# Patient Record
Sex: FEMALE | Race: WHITE | NOT HISPANIC OR LATINO | Employment: FULL TIME | URBAN - METROPOLITAN AREA
[De-identification: names, ages, dates, MRNs, and addresses within clinical notes are randomized per-mention and may not be internally consistent; named-entity substitution may affect disease eponyms.]

---

## 2020-03-11 ENCOUNTER — OFFICE VISIT (OUTPATIENT)
Dept: OBSTETRICS AND GYNECOLOGY | Facility: CLINIC | Age: 30
End: 2020-03-11
Payer: COMMERCIAL

## 2020-03-11 VITALS — SYSTOLIC BLOOD PRESSURE: 108 MMHG | DIASTOLIC BLOOD PRESSURE: 60 MMHG

## 2020-03-11 DIAGNOSIS — O20.0 THREATENED ABORTION: Primary | ICD-10-CM

## 2020-03-11 PROCEDURE — 76801 OB US < 14 WKS SINGLE FETUS: CPT | Performed by: OBSTETRICS & GYNECOLOGY

## 2020-03-11 PROCEDURE — 99203 OFFICE O/P NEW LOW 30 MIN: CPT | Performed by: OBSTETRICS & GYNECOLOGY

## 2020-03-11 NOTE — PROGRESS NOTES
Patient is a 29 y.o. female who presents with amenorrhea.   LMP 20 puts at 5 3/7 weeks  Menses q28 days   Vaginal bleeding started yesterday  No N/V  + Breast tenderness  Mild mild lower abdominal pain   TTC     Lives with   Works as a teacher, first grade   No Zika travel in last 6 months   Pets at home - dog  Chicken pox - was vaccinated     OB History:   OB History    Para Term  AB Living   0 0 0 0 0 0   SAB TAB Ectopic Multiple Live Births   0 0 0 0 0         Medical History:   Past Medical History:   Diagnosis Date   • Anxiety     used to see a therapist, previously on lexapro       Surgical History:   Past Surgical History:   Procedure Laterality Date   • TONSILLECTOMY & ADENOIDECTOMY         Social History:   Social History     Social History Narrative   • Not on file       Family History:   Family History   Problem Relation Age of Onset   • Liver cancer Biological Father    • Ovarian cancer Father's Sister    • Liver cancer Father's Brother        Allergies: Patient has no known allergies.    Prior to Admission medications    Not on File       Review of Systems  Pertinent items are noted in HPI.    Objective     Vital Signs for the last 24 hours:  BP: (108)/(60) 108/60    Review of Systems and Physical Exam  The following have been reviewed and updated as appropriate in this visit: Tobacco  Allergies  Meds  Med Hx  Surg Hx  Fam Hx  Soc Hx      Visit Vitals  /60   LMP 2020     HPI  Review of Systems  Physical Exam   Constitutional: She appears well-developed and well-nourished.   Genitourinary: Uterus normal.   External female genitalia normal.   Urethral meatus normal.   Urethra normal.   Normal bladder. There is bleeding (dark blood in vault, no blood from cervix) in the vagina.   Cervix exam normal.Cervix does not exhibit motion tenderness.   Uterus is normal. Uterus is not tender.   Adnexa normal. Right adnexum does not display mass and does not display  tenderness. Left adnexum does not display mass and does not display tenderness.   Cardiovascular: Normal rate, regular rhythm and normal heart sounds.   Pulmonary/Chest: Effort normal and breath sounds normal.   Abdominal: Soft. She exhibits no distension and no mass. There is no tenderness. There is no guarding.   Skin: No erythema.   Psychiatric: She has a normal mood and affect. Her behavior is normal.     TVUS: anteverted uterus, thin EMS, no GS noted  No adnexal masses, no free fluid     Assessment/Plan     Early pregnancy with bleeding  No IUP noted.  Discussed DDX: normal pregnancy, SAB, ectopic.  Reviewed ectopic precautions  Ordered T&S, CBC, serial bHCGs    Follow up results to determine f/u

## 2020-03-12 LAB
ABO GROUP BLD: NORMAL
ERYTHROCYTE [DISTWIDTH] IN BLOOD BY AUTOMATED COUNT: 13.1 % (ref 11.7–15.4)
HCG INTACT+B SERPL-ACNC: 21 MIU/ML
HCT VFR BLD AUTO: 40.2 % (ref 34–46.6)
HGB BLD-MCNC: 12.9 G/DL (ref 11.1–15.9)
MCH RBC QN AUTO: 29.6 PG (ref 26.6–33)
MCHC RBC AUTO-ENTMCNC: 32.1 G/DL (ref 31.5–35.7)
MCV RBC AUTO: 92 FL (ref 79–97)
PLATELET # BLD AUTO: 222 X10E3/UL (ref 150–450)
PROGEST SERPL-MCNC: 0.2 NG/ML
RBC # BLD AUTO: 4.36 X10E6/UL (ref 3.77–5.28)
RH BLD: POSITIVE
WBC # BLD AUTO: 4.8 X10E3/UL (ref 3.4–10.8)

## 2020-03-14 LAB — HCG INTACT+B SERPL-ACNC: 8 MIU/ML

## 2020-03-16 DIAGNOSIS — O03.9 SPONTANEOUS ABORTION: Primary | ICD-10-CM

## 2020-03-20 LAB — HCG INTACT+B SERPL-ACNC: <1 MIU/ML

## 2020-07-21 ENCOUNTER — OFFICE VISIT (OUTPATIENT)
Dept: OBSTETRICS AND GYNECOLOGY | Facility: CLINIC | Age: 30
End: 2020-07-21
Payer: COMMERCIAL

## 2020-07-21 VITALS — WEIGHT: 118 LBS | SYSTOLIC BLOOD PRESSURE: 104 MMHG | DIASTOLIC BLOOD PRESSURE: 62 MMHG

## 2020-07-21 DIAGNOSIS — N91.2 AMENORRHEA: Primary | ICD-10-CM

## 2020-07-21 DIAGNOSIS — O35.10X0 SUSPECTED CHROMOSOME ANOMALY OF FETUS AFFECTING MANAGEMENT OF MOTHER, ANTEPARTUM, SINGLE OR UNSPECIFIED FETUS: ICD-10-CM

## 2020-07-21 DIAGNOSIS — Z34.01 ENCOUNTER FOR SUPERVISION OF NORMAL FIRST PREGNANCY IN FIRST TRIMESTER: ICD-10-CM

## 2020-07-21 LAB
GLUCOSE URINE, POC: NEGATIVE
PROTEIN, POC: NEGATIVE

## 2020-07-21 PROCEDURE — 81002 URINALYSIS NONAUTO W/O SCOPE: CPT | Performed by: OBSTETRICS & GYNECOLOGY

## 2020-07-21 PROCEDURE — 99214 OFFICE O/P EST MOD 30 MIN: CPT | Mod: 25 | Performed by: OBSTETRICS & GYNECOLOGY

## 2020-07-21 PROCEDURE — 76801 OB US < 14 WKS SINGLE FETUS: CPT | Performed by: OBSTETRICS & GYNECOLOGY

## 2020-07-21 NOTE — PROGRESS NOTES
New OB History and Physical    HPI     Patient is a 29 y.o. female who presents with amenorrhea. Some fatigue, not feeling too bad. Mild nausea.    LMP 20  LUZ MARIA 3/1/21  EGA 8w1d      No Zika travel in last 6 months   Pets at home -- no cats  Chicken pox -- had vaccine    OB History:   OB History    Para Term  AB Living   2 0 0 0 1 0   SAB TAB Ectopic Multiple Live Births   0 0 0 0 0      # Outcome Date GA Lbr Andrea/2nd Weight Sex Delivery Anes PTL Lv   2 Current            1 AB                Genetic Screening:   Congenital Heart Defect:  Neural tube defect:  Hemoglobinopathy or carrier:   Cystic fibrosis:  Chromosome abnormality:  Josue-Sachs/ Canavan/ Familial Dysautonomia/ Huntingtons Chorea:  Sickle Cell Disease or Trait:    Hemophilia:   Intellectual disability/autism:   Recurrent pregnancy loss/stillbirth:   Other structural birth defect:   Other genetic disease:   All reviewed and negative for both patient and partner.     Infection history:  Live with someone with TB or exposed to TB:  Patient or partner has history of genital herpes:  Rash or viral illness since last menstrual period:  Prior GBS infected child:  Positive history of sexually transmitted infection:  HIV infection:  History of hepatitis:  Recent travel history her partner travel outside of country:  Recent exposure to Zika virus including by partner:  All reviewed and negative for both patient and partner.    Medical History:   Past Medical History:   Diagnosis Date   • Anxiety     used to see a therapist, previously on lexapro       Surgical History:   Past Surgical History:   Procedure Laterality Date   • TONSILLECTOMY & ADENOIDECTOMY         Social History:   Social History     Social History Narrative   • Not on file       Family History:   Family History   Problem Relation Age of Onset   • Liver cancer Biological Father    • Ovarian cancer Father's Sister    • Liver cancer Father's Brother    • Breast  cancer Neg Hx    • Colon cancer Neg Hx        Allergies: Patient has no known allergies.    Prior to Admission medications    Medication Sig Start Date End Date Taking? Authorizing Provider   prenatal vit-iron fum-folic ac 27 mg iron- 0.8 mg tablet tablet Take 1 tablet by mouth daily.    Provider, MD Cong       Review of Systems  All other systems reviewed and negative except as noted in the HPI.    Objective     Vital Signs for the last 24 hours:  BP: (104)/(62) 104/62    Review of Systems and Physical Exam  The following have been reviewed and updated as appropriate in this visit: Med Hx  Surg Hx  Fam Hx       Visit Vitals  /62   Wt 53.5 kg (118 lb)   LMP 05/25/2020     HPI  Review of Systems  Physical Exam   Constitutional: She is oriented to person, place, and time. She appears well-developed and well-nourished. She is cooperative. No distress.   Genitourinary: Vagina normal and uterus normal. No labial lesion.   External female genitalia normal. There is no right labia majora lesion or labia minora lesion. There is no left labia majora lesion or labia minora lesion. There is no lesion or tenderness on the right external genitalia. There is no tenderness on the left external genitalia.   Urethral meatus normal.   Urethra normal.   Vagina normal.   Cervix exam normal.  Uterus is normal.   Adnexa normal.   Pulmonary/Chest: Effort normal.   Abdominal: Soft. She exhibits no distension and no mass. There is no tenderness. There is no guarding.   Neurological: She is alert and oriented to person, place, and time. No sensory deficit. She exhibits normal muscle tone. Coordination normal.   Skin: Skin is warm and dry. No rash noted. She is not diaphoretic. No erythema. No pallor.   Psychiatric: She has a normal mood and affect. Her behavior is normal. Judgment and thought content normal.       Assessment/Plan     Amenorrhea secondary to early pregnancy  LUZ MARIA based on LMP is 3/1/21     Transvaginal  ultrasound demonstrates  SIUP,   1.2cm simple left adnexal follicle  YS WNL    This is consistent with LMP LUZ MARIA     First OB labs ordered     Pap smear -- done today    Genetic screening counseling options reviewed.  Patient unsure. Rx placed for SS.    SMA and CF carrier screening offered, Rx given.    Practice, call, diet, exercise, pregnancy precautions discussed.      COVID-19 precautions discussed.    RTO 4 wks

## 2020-07-22 LAB
APPEARANCE UR: CLEAR
BACTERIA #/AREA URNS HPF: NORMAL /[HPF]
BILIRUB UR QL STRIP: NEGATIVE
COLOR UR: YELLOW
EPI CELLS #/AREA URNS HPF: NORMAL /HPF (ref 0–10)
GLUCOSE UR QL: NEGATIVE
HGB UR QL STRIP: NEGATIVE
KETONES UR QL STRIP: NEGATIVE
LEUKOCYTE ESTERASE UR QL STRIP: NEGATIVE
MICRO URNS: NORMAL
MICRO URNS: NORMAL
NITRITE UR QL STRIP: NEGATIVE
PH UR STRIP: 6 [PH] (ref 5–7.5)
PROT UR QL STRIP: NEGATIVE
RBC #/AREA URNS HPF: NORMAL /HPF (ref 0–2)
SP GR UR: 1.01 (ref 1–1.03)
UROBILINOGEN UR STRIP-MCNC: 0.2 MG/DL (ref 0.2–1)
WBC #/AREA URNS HPF: NORMAL /HPF (ref 0–5)

## 2020-07-23 LAB
BACTERIA UR CULT: NO GROWTH
BACTERIA UR CULT: NORMAL
C TRACH RRNA SPEC QL NAA+PROBE: NEGATIVE
N GONORRHOEA RRNA SPEC QL NAA+PROBE: NEGATIVE

## 2020-07-29 LAB
BASOPHILS # BLD AUTO: 0 X10E3/UL (ref 0–0.2)
BASOPHILS NFR BLD AUTO: 0 %
EOSINOPHIL # BLD AUTO: 0.1 X10E3/UL (ref 0–0.4)
EOSINOPHIL NFR BLD AUTO: 2 %
ERYTHROCYTE [DISTWIDTH] IN BLOOD BY AUTOMATED COUNT: 12.3 % (ref 11.7–15.4)
HCT VFR BLD AUTO: 38.6 % (ref 34–46.6)
HGB BLD-MCNC: 12.9 G/DL (ref 11.1–15.9)
IMM GRANULOCYTES # BLD AUTO: 0 X10E3/UL (ref 0–0.1)
IMM GRANULOCYTES NFR BLD AUTO: 0 %
LYMPHOCYTES # BLD AUTO: 1.6 X10E3/UL (ref 0.7–3.1)
LYMPHOCYTES NFR BLD AUTO: 22 %
MCH RBC QN AUTO: 30.9 PG (ref 26.6–33)
MCHC RBC AUTO-ENTMCNC: 33.4 G/DL (ref 31.5–35.7)
MCV RBC AUTO: 93 FL (ref 79–97)
MONOCYTES # BLD AUTO: 0.4 X10E3/UL (ref 0.1–0.9)
MONOCYTES NFR BLD AUTO: 5 %
NEUTROPHILS # BLD AUTO: 5.3 X10E3/UL (ref 1.4–7)
NEUTROPHILS NFR BLD AUTO: 71 %
PLATELET # BLD AUTO: 203 X10E3/UL (ref 150–450)
RBC # BLD AUTO: 4.17 X10E6/UL (ref 3.77–5.28)
WBC # BLD AUTO: 7.4 X10E3/UL (ref 3.4–10.8)

## 2020-07-30 LAB
ABO GROUP BLD: NORMAL
BLD GP AB SCN SERPL QL: NEGATIVE
CYTOLOGIST CVX/VAG CYTO: ABNORMAL
CYTOLOGY CVX/VAG DOC CYTO: ABNORMAL
CYTOLOGY CVX/VAG DOC THIN PREP: ABNORMAL
DX ICD CODE: ABNORMAL
HBV SURFACE AG SERPL QL IA: NEGATIVE
HCV AB S/CO SERPL IA: <0.1 S/CO RATIO (ref 0–0.9)
HIV 1+2 AB+HIV1 P24 AG SERPL QL IA: NON REACTIVE
HPV I/H RISK 4 DNA CVX QL PROBE+SIG AMP: POSITIVE
HPV16 DNA CVX QL PROBE+SIG AMP: NEGATIVE
HPV18+45 E6+E7 MRNA CVX QL NAA+PROBE: NEGATIVE
LAB CORP NOTE:: ABNORMAL
OTHER STN SPEC: ABNORMAL
RH BLD: POSITIVE
RPR SER QL: NON REACTIVE
RUBV IGG SERPL IA-ACNC: 4.41 INDEX
STAT OF ADQ CVX/VAG CYTO-IMP: ABNORMAL
VZV IGG SER IA-ACNC: 246 INDEX

## 2020-08-05 LAB
CFTR MUT ANL BLD/T: NORMAL
LAB CORP COMMENT:: NORMAL

## 2020-08-12 LAB
CLINICAL INFO: NORMAL
ETHNIC BACKGROUND STATED: NORMAL
LAB CORP GENETIC COUNSELOR:: NORMAL
LAB DIRECTOR NAME PROVIDER: NORMAL
LABCORP SMN1 GENERAL COMMENTS: NORMAL
LABCORP SMN1 INDICATION:: NORMAL
REF LAB TEST METHOD: NORMAL
SMN1 GENE MUT ANL BLD/T: NORMAL
SPECIMEN SOURCE: NORMAL
TEST PERFORMANCE INFO SPEC: NORMAL
TEST PERFORMANCE INFO SPEC: NORMAL

## 2020-08-18 ENCOUNTER — OFFICE VISIT (OUTPATIENT)
Dept: OBSTETRICS AND GYNECOLOGY | Facility: CLINIC | Age: 30
End: 2020-08-18
Payer: COMMERCIAL

## 2020-08-18 VITALS
BODY MASS INDEX: 22.45 KG/M2 | HEIGHT: 62 IN | SYSTOLIC BLOOD PRESSURE: 120 MMHG | WEIGHT: 122 LBS | DIASTOLIC BLOOD PRESSURE: 60 MMHG

## 2020-08-18 DIAGNOSIS — Z34.01 ENCOUNTER FOR SUPERVISION OF NORMAL FIRST PREGNANCY IN FIRST TRIMESTER: Primary | ICD-10-CM

## 2020-08-18 PROBLEM — O20.0 THREATENED ABORTION: Status: RESOLVED | Noted: 2020-03-11 | Resolved: 2020-08-18

## 2020-08-18 PROBLEM — B97.7 HPV (HUMAN PAPILLOMA VIRUS) INFECTION: Status: ACTIVE | Noted: 2020-08-18

## 2020-08-18 PROCEDURE — 0500F INITIAL PRENATAL CARE VISIT: CPT | Performed by: OBSTETRICS & GYNECOLOGY

## 2020-08-18 NOTE — PRENATAL NOTE
Feeling good   PNL reviewed and WNL   Pap with neg cytology, + HPV (non 16/18).  Plan for cotesting in 1 year  SS- unsure if getting done, will decide today and schedule   Carrier testing for CF/SMA neg   RX FAS   F/u telemed in 4, office in 8 weeks

## 2020-09-08 ENCOUNTER — NURSE TRIAGE (OUTPATIENT)
Dept: OBSTETRICS AND GYNECOLOGY | Facility: CLINIC | Age: 30
End: 2020-09-08

## 2020-09-08 NOTE — TELEPHONE ENCOUNTER
"Spoke to pt     Pt reporting:  Noted  Saturday night   Right side upper abd above umbilicus mild pain  Goes away with movement and walking    Pt instructed it safe to take Tums and Tylenol 650 MG every 4 hours PRN  Colace at HS pt reported \"slight constipation\"    Pt currently having no pain     No bleeding  No N/V  No fever    Pt will call back with worsen symptoms or concerns            Reason for Disposition  • MILD abdominal pain (e.g., doesn't interfere with normal activities)    Protocols used: PREGNANCY - ABDOMINAL PAIN LESS THAN 20 WEEKS EGA-ADULT-OH      "

## 2020-09-12 NOTE — PRENATAL NOTE
Verification of Patient Location:  The patient affirms they are currently located in the following state:[place]     Request for Consent:  You and I are about to have a telemedicine check-in or visit. This is allowed because you are already my patient, and you have requested it.  This telemedicine visit will be billed to your health insurance or you, if you are self-insured.  You understand you will be responsible for any copayments or coinsurances that apply to your telemedicine visit.  Before starting our telemedicine visit, I am required to get your consent for this virtual check-in or visit by telemedicine. Do you consent?      Patient Response to Request for Consent: Yes    The following have been reviewed and updated as appropriate in this visit:         Visit Documentation:  - feeling well overall.   - SS- declines   - Rx MSAFP (mail slips)  - FAS planned for October  - Next visit 4 weeks in office    Time Spent in Medical Discussion During This Encounter:  8 minutes

## 2020-09-15 ENCOUNTER — TELEMEDICINE (OUTPATIENT)
Dept: OBSTETRICS AND GYNECOLOGY | Facility: CLINIC | Age: 30
End: 2020-09-15
Payer: COMMERCIAL

## 2020-09-15 DIAGNOSIS — Z3A.16 16 WEEKS GESTATION OF PREGNANCY: ICD-10-CM

## 2020-09-15 DIAGNOSIS — Z34.82 ENCOUNTER FOR SUPERVISION OF OTHER NORMAL PREGNANCY IN SECOND TRIMESTER: Primary | ICD-10-CM

## 2020-09-15 PROBLEM — Z34.90 PREGNANCY: Status: ACTIVE | Noted: 2020-09-15

## 2020-09-15 PROCEDURE — 0502F SUBSEQUENT PRENATAL CARE: CPT | Mod: 95 | Performed by: OBSTETRICS & GYNECOLOGY

## 2020-10-05 LAB
AFP ADJ MOM SERPL: 0.87
AFP INTERP SERPL-IMP: NORMAL
AFP INTERP SERPL-IMP: NORMAL
AFP SERPL-MCNC: 45.3 NG/ML
AGE AT DELIVERY: 32.2 YR
GA METHOD: NORMAL
GA: 18.3 WEEKS
IDDM PATIENT QL: NO
LAB CORP COMMENT:: NORMAL
LAB CORP RESULTS: NORMAL
MULTIPLE PREGNANCY: NO
NEURAL TUBE DEFECT RISK FETUS: NORMAL %

## 2020-10-14 ENCOUNTER — HOSPITAL ENCOUNTER (OUTPATIENT)
Dept: PERINATAL CARE | Facility: HOSPITAL | Age: 30
Discharge: HOME | End: 2020-10-14
Attending: OBSTETRICS & GYNECOLOGY
Payer: COMMERCIAL

## 2020-10-14 ENCOUNTER — OFFICE VISIT (OUTPATIENT)
Dept: OBSTETRICS AND GYNECOLOGY | Facility: CLINIC | Age: 30
End: 2020-10-14
Payer: COMMERCIAL

## 2020-10-14 ENCOUNTER — TRANSCRIBE ORDERS (OUTPATIENT)
Dept: REGISTRATION | Facility: HOSPITAL | Age: 30
End: 2020-10-14

## 2020-10-14 VITALS — SYSTOLIC BLOOD PRESSURE: 100 MMHG | BODY MASS INDEX: 24.03 KG/M2 | WEIGHT: 131.4 LBS | DIASTOLIC BLOOD PRESSURE: 60 MMHG

## 2020-10-14 DIAGNOSIS — Z34.82 ENCOUNTER FOR SUPERVISION OF OTHER NORMAL PREGNANCY IN SECOND TRIMESTER: Primary | ICD-10-CM

## 2020-10-14 DIAGNOSIS — O44.43 LOW LYING PLACENTA NOS OR WITHOUT HEMORRHAGE, THIRD TRIMESTER: Primary | ICD-10-CM

## 2020-10-14 DIAGNOSIS — Z36.3 ANTENATAL SCREENING FOR MALFORMATION USING ULTRASONICS: ICD-10-CM

## 2020-10-14 DIAGNOSIS — O35.9XX0 SUSPECTED FETAL ABNORMALITY AFFECTING MANAGEMENT OF MOTHER, SINGLE OR UNSPECIFIED FETUS: ICD-10-CM

## 2020-10-14 DIAGNOSIS — Z34.01 ENCOUNTER FOR SUPERVISION OF NORMAL FIRST PREGNANCY IN FIRST TRIMESTER: ICD-10-CM

## 2020-10-14 DIAGNOSIS — Z36.86 ENCOUNTER FOR SCREENING FOR RISK OF PRE-TERM LABOR: ICD-10-CM

## 2020-10-14 DIAGNOSIS — Z23 FLU VACCINE NEED: ICD-10-CM

## 2020-10-14 DIAGNOSIS — O44.42 LOW LYING PLACENTA NOS OR WITHOUT HEMORRHAGE, SECOND TRIMESTER: ICD-10-CM

## 2020-10-14 DIAGNOSIS — Z3A.20 20 WEEKS GESTATION OF PREGNANCY: ICD-10-CM

## 2020-10-14 PROBLEM — O44.40 LOW-LYING PLACENTA: Status: ACTIVE | Noted: 2020-10-14

## 2020-10-14 LAB
GLUCOSE URINE, POC: NEGATIVE
PROTEIN, POC: NEGATIVE

## 2020-10-14 PROCEDURE — 0502F SUBSEQUENT PRENATAL CARE: CPT | Performed by: OBSTETRICS & GYNECOLOGY

## 2020-10-14 PROCEDURE — 76805 OB US >/= 14 WKS SNGL FETUS: CPT

## 2020-10-14 PROCEDURE — 90686 IIV4 VACC NO PRSV 0.5 ML IM: CPT | Performed by: OBSTETRICS & GYNECOLOGY

## 2020-10-14 PROCEDURE — 81002 URINALYSIS NONAUTO W/O SCOPE: CPT | Performed by: OBSTETRICS & GYNECOLOGY

## 2020-10-14 PROCEDURE — 90471 IMMUNIZATION ADMIN: CPT | Performed by: OBSTETRICS & GYNECOLOGY

## 2020-10-14 NOTE — PRENATAL NOTE
- feeling well. Starting to feel flutters  - MSAFP neg  - FAS today, WNL. Low lying posterior placenta noted. Follow up US planned for 12/21  - flu shot  - Telemed visit 4 weeks

## 2020-10-22 ENCOUNTER — TELEPHONE (OUTPATIENT)
Dept: OBSTETRICS AND GYNECOLOGY | Facility: CLINIC | Age: 30
End: 2020-10-22

## 2020-11-11 ENCOUNTER — TELEMEDICINE (OUTPATIENT)
Dept: OBSTETRICS AND GYNECOLOGY | Facility: CLINIC | Age: 30
End: 2020-11-11
Payer: COMMERCIAL

## 2020-11-11 DIAGNOSIS — Z34.02 ENCOUNTER FOR SUPERVISION OF NORMAL FIRST PREGNANCY IN SECOND TRIMESTER: Primary | ICD-10-CM

## 2020-11-11 DIAGNOSIS — Z34.83 ENCOUNTER FOR SUPERVISION OF OTHER NORMAL PREGNANCY IN THIRD TRIMESTER: Primary | ICD-10-CM

## 2020-11-11 PROCEDURE — 0502F SUBSEQUENT PRENATAL CARE: CPT | Performed by: OBSTETRICS & GYNECOLOGY

## 2020-11-11 NOTE — PROGRESS NOTES
Verification of Patient Location:  The patient affirms they are currently located in the following state:[place]     Request for Consent:  You and I are about to have a telemedicine check-in or visit. This is allowed because you are already my patient, and you have requested it.  This telemedicine visit will be billed to your health insurance or you, if you are self-insured.  You understand you will be responsible for any copayments or coinsurances that apply to your telemedicine visit.  Before starting our telemedicine visit, I am required to get your consent for this virtual check-in or visit by telemedicine. Do you consent?      Patient Response to Request for Consent: Yes    The following have been reviewed and updated as appropriate in this visit:         Visit Documentation:  Telemedicine visit due to COVID-19  No c/o's  Has f/u PTC US (low lying placenta)  FAC reviewed  Will email Rx for GTT  Office visit 4 wks    Time Spent in Medical Discussion During This Encounter:  8 minutes

## 2020-12-03 LAB
ERYTHROCYTE [DISTWIDTH] IN BLOOD BY AUTOMATED COUNT: 12.9 % (ref 11.7–15.4)
GLUCOSE 1H P 50 G GLC PO SERPL-MCNC: 132 MG/DL (ref 65–139)
HCT VFR BLD AUTO: 33.6 % (ref 34–46.6)
HGB BLD-MCNC: 11.4 G/DL (ref 11.1–15.9)
MCH RBC QN AUTO: 32.5 PG (ref 26.6–33)
MCHC RBC AUTO-ENTMCNC: 33.9 G/DL (ref 31.5–35.7)
MCV RBC AUTO: 96 FL (ref 79–97)
PLATELET # BLD AUTO: 157 X10E3/UL (ref 150–450)
RBC # BLD AUTO: 3.51 X10E6/UL (ref 3.77–5.28)
RPR SER QL: NON REACTIVE
WBC # BLD AUTO: 9.9 X10E3/UL (ref 3.4–10.8)

## 2020-12-08 ENCOUNTER — OFFICE VISIT (OUTPATIENT)
Dept: OBSTETRICS AND GYNECOLOGY | Facility: CLINIC | Age: 30
End: 2020-12-08
Payer: COMMERCIAL

## 2020-12-08 VITALS — BODY MASS INDEX: 26.12 KG/M2 | WEIGHT: 142.8 LBS | SYSTOLIC BLOOD PRESSURE: 92 MMHG | DIASTOLIC BLOOD PRESSURE: 68 MMHG

## 2020-12-08 DIAGNOSIS — Z23 NEED FOR TDAP VACCINATION: ICD-10-CM

## 2020-12-08 DIAGNOSIS — Z34.83 ENCOUNTER FOR SUPERVISION OF OTHER NORMAL PREGNANCY IN THIRD TRIMESTER: Primary | ICD-10-CM

## 2020-12-08 LAB
GLUCOSE URINE, POC: NEGATIVE
PROTEIN, POC: NEGATIVE

## 2020-12-08 PROCEDURE — 0502F SUBSEQUENT PRENATAL CARE: CPT | Performed by: NURSE PRACTITIONER

## 2020-12-08 PROCEDURE — 90471 IMMUNIZATION ADMIN: CPT | Performed by: NURSE PRACTITIONER

## 2020-12-08 PROCEDURE — 90715 TDAP VACCINE 7 YRS/> IM: CPT | Performed by: NURSE PRACTITIONER

## 2020-12-08 PROCEDURE — 81002 URINALYSIS NONAUTO W/O SCOPE: CPT | Performed by: NURSE PRACTITIONER

## 2020-12-08 NOTE — PRENATAL NOTE
No contractions: reviewed S&S of PTL/PIH  FM+  All testing is up to date  Vaccines are up to date  Reviewed COVID precautions   RTO 2 weeks

## 2020-12-21 ENCOUNTER — HOSPITAL ENCOUNTER (OUTPATIENT)
Dept: PERINATAL CARE | Facility: HOSPITAL | Age: 30
Discharge: HOME | End: 2020-12-21
Attending: OBSTETRICS & GYNECOLOGY
Payer: COMMERCIAL

## 2020-12-21 DIAGNOSIS — O44.43 LOW LYING PLACENTA NOS OR WITHOUT HEMORRHAGE, THIRD TRIMESTER: ICD-10-CM

## 2020-12-21 DIAGNOSIS — Z3A.30 30 WEEKS GESTATION OF PREGNANCY: ICD-10-CM

## 2020-12-21 PROCEDURE — 76816 OB US FOLLOW-UP PER FETUS: CPT

## 2021-01-06 ENCOUNTER — OFFICE VISIT (OUTPATIENT)
Dept: OBSTETRICS AND GYNECOLOGY | Facility: CLINIC | Age: 31
End: 2021-01-06
Payer: COMMERCIAL

## 2021-01-06 VITALS
WEIGHT: 150.2 LBS | BODY MASS INDEX: 27.64 KG/M2 | RESPIRATION RATE: 16 BRPM | SYSTOLIC BLOOD PRESSURE: 102 MMHG | HEIGHT: 62 IN | DIASTOLIC BLOOD PRESSURE: 60 MMHG | OXYGEN SATURATION: 98 % | HEART RATE: 110 BPM

## 2021-01-06 DIAGNOSIS — Z34.83 ENCOUNTER FOR SUPERVISION OF OTHER NORMAL PREGNANCY IN THIRD TRIMESTER: Primary | ICD-10-CM

## 2021-01-06 LAB
BLOOD URINE, POC: NEGATIVE
EXPIRATION DATE: NORMAL
GLUCOSE URINE, POC: NEGATIVE
KETONES, POC: NEGATIVE
LEUKOCYTE EST, POC: NEGATIVE
Lab: NORMAL
NITRITE, POC: NEGATIVE
POCT MANUFACTURER: NORMAL
PROTEIN, POC: NORMAL

## 2021-01-06 PROCEDURE — 0502F SUBSEQUENT PRENATAL CARE: CPT | Performed by: OBSTETRICS & GYNECOLOGY

## 2021-01-06 PROCEDURE — 81002 URINALYSIS NONAUTO W/O SCOPE: CPT | Performed by: OBSTETRICS & GYNECOLOGY

## 2021-01-06 ASSESSMENT — PAIN SCALES - GENERAL: PAINLEVEL: 0-NO PAIN

## 2021-01-06 NOTE — PRENATAL NOTE
- Feels well.  - Low-lying placenta resolved on f/u US.  - GTT/CBC wnl, RPR neg.  - PTL and preeclampsia s/s reviewed.  - COVID precautions, policies, and isolation reviewed.  - Telemed 2wks, RTO 4wks.

## 2021-01-18 ENCOUNTER — TELEPHONE (OUTPATIENT)
Dept: OBSTETRICS AND GYNECOLOGY | Facility: CLINIC | Age: 31
End: 2021-01-18

## 2021-01-19 ENCOUNTER — TELEMEDICINE (OUTPATIENT)
Dept: OBSTETRICS AND GYNECOLOGY | Facility: CLINIC | Age: 31
End: 2021-01-19
Payer: COMMERCIAL

## 2021-01-19 DIAGNOSIS — Z34.83 ENCOUNTER FOR SUPERVISION OF OTHER NORMAL PREGNANCY IN THIRD TRIMESTER: Primary | ICD-10-CM

## 2021-01-19 PROCEDURE — 0502F SUBSEQUENT PRENATAL CARE: CPT | Mod: 95 | Performed by: OBSTETRICS & GYNECOLOGY

## 2021-01-19 NOTE — PRENATAL NOTE
Verification of Patient Location:  The patient affirms they are currently located in the following state:[place]     Request for Consent:  You and I are about to have a telemedicine check-in or visit. This is allowed because you are already my patient, and you have requested it.  This telemedicine visit will be billed to your health insurance or you, if you are self-insured.  You understand you will be responsible for any copayments or coinsurances that apply to your telemedicine visit.  Before starting our telemedicine visit, I am required to get your consent for this virtual check-in or visit by telemedicine. Do you consent?      Patient Response to Request for Consent: Yes    The following have been reviewed and updated as appropriate in this visit:  Allergies  Meds  Problems         Visit Documentation:  - Feels well.  - Active FM.  - PTL/preeclampsia s/s reviewed.  - RTO 2wks.    Time Spent:  I spent 5 minutes on this date of service performing the following activities: obtaining history, documenting, preparing for visit and providing counseling and education.

## 2021-02-01 ENCOUNTER — OFFICE VISIT (OUTPATIENT)
Dept: OBSTETRICS AND GYNECOLOGY | Facility: CLINIC | Age: 31
End: 2021-02-01
Payer: COMMERCIAL

## 2021-02-01 VITALS — DIASTOLIC BLOOD PRESSURE: 64 MMHG | BODY MASS INDEX: 28.17 KG/M2 | WEIGHT: 154 LBS | SYSTOLIC BLOOD PRESSURE: 110 MMHG

## 2021-02-01 DIAGNOSIS — Z34.83 ENCOUNTER FOR SUPERVISION OF OTHER NORMAL PREGNANCY IN THIRD TRIMESTER: Primary | ICD-10-CM

## 2021-02-01 LAB
GLUCOSE URINE, POC: NEGATIVE
PROTEIN, POC: NEGATIVE

## 2021-02-01 PROCEDURE — 0502F SUBSEQUENT PRENATAL CARE: CPT | Performed by: OBSTETRICS & GYNECOLOGY

## 2021-02-01 PROCEDURE — 81002 URINALYSIS NONAUTO W/O SCOPE: CPT | Performed by: OBSTETRICS & GYNECOLOGY

## 2021-02-01 NOTE — PRENATAL NOTE
Feeling good  Cervix 1/70  GBS collected  Position by - [facility]  Teaching- virtual    Reviewed FKC/ labor precautions  RTC 1 week

## 2021-02-04 LAB — GP B STREP DNA SPEC QL NAA+PROBE: NEGATIVE

## 2021-02-09 ENCOUNTER — OFFICE VISIT (OUTPATIENT)
Dept: OBSTETRICS AND GYNECOLOGY | Facility: CLINIC | Age: 31
End: 2021-02-09
Payer: COMMERCIAL

## 2021-02-09 VITALS
WEIGHT: 156 LBS | HEIGHT: 62 IN | BODY MASS INDEX: 28.71 KG/M2 | DIASTOLIC BLOOD PRESSURE: 70 MMHG | SYSTOLIC BLOOD PRESSURE: 108 MMHG

## 2021-02-09 DIAGNOSIS — Z34.83 ENCOUNTER FOR SUPERVISION OF OTHER NORMAL PREGNANCY IN THIRD TRIMESTER: Primary | ICD-10-CM

## 2021-02-09 LAB
GLUCOSE URINE, POC: NEGATIVE
PROTEIN, POC: NEGATIVE

## 2021-02-09 PROCEDURE — 81002 URINALYSIS NONAUTO W/O SCOPE: CPT | Performed by: OBSTETRICS & GYNECOLOGY

## 2021-02-09 PROCEDURE — 0502F SUBSEQUENT PRENATAL CARE: CPT | Performed by: OBSTETRICS & GYNECOLOGY

## 2021-02-16 ENCOUNTER — OFFICE VISIT (OUTPATIENT)
Dept: OBSTETRICS AND GYNECOLOGY | Facility: CLINIC | Age: 31
End: 2021-02-16
Payer: COMMERCIAL

## 2021-02-16 VITALS — DIASTOLIC BLOOD PRESSURE: 60 MMHG | BODY MASS INDEX: 28.68 KG/M2 | WEIGHT: 156.8 LBS | SYSTOLIC BLOOD PRESSURE: 110 MMHG

## 2021-02-16 DIAGNOSIS — Z3A.38 38 WEEKS GESTATION OF PREGNANCY: ICD-10-CM

## 2021-02-16 DIAGNOSIS — Z34.83 ENCOUNTER FOR SUPERVISION OF OTHER NORMAL PREGNANCY IN THIRD TRIMESTER: Primary | ICD-10-CM

## 2021-02-16 LAB
GLUCOSE URINE, POC: NEGATIVE
PROTEIN, POC: NEGATIVE

## 2021-02-16 PROCEDURE — 0502F SUBSEQUENT PRENATAL CARE: CPT | Performed by: OBSTETRICS & GYNECOLOGY

## 2021-02-16 PROCEDURE — 81002 URINALYSIS NONAUTO W/O SCOPE: CPT | Performed by: OBSTETRICS & GYNECOLOGY

## 2021-02-24 ENCOUNTER — OFFICE VISIT (OUTPATIENT)
Dept: OBSTETRICS AND GYNECOLOGY | Facility: CLINIC | Age: 31
End: 2021-02-24
Payer: COMMERCIAL

## 2021-02-24 VITALS
HEIGHT: 62 IN | SYSTOLIC BLOOD PRESSURE: 100 MMHG | DIASTOLIC BLOOD PRESSURE: 66 MMHG | BODY MASS INDEX: 29.33 KG/M2 | WEIGHT: 159.4 LBS

## 2021-02-24 DIAGNOSIS — Z34.83 ENCOUNTER FOR SUPERVISION OF OTHER NORMAL PREGNANCY IN THIRD TRIMESTER: Primary | ICD-10-CM

## 2021-02-24 LAB
GLUCOSE URINE, POC: NEGATIVE
PROTEIN, POC: NEGATIVE

## 2021-02-24 PROCEDURE — 81002 URINALYSIS NONAUTO W/O SCOPE: CPT | Performed by: OBSTETRICS & GYNECOLOGY

## 2021-02-24 PROCEDURE — 0502F SUBSEQUENT PRENATAL CARE: CPT | Performed by: OBSTETRICS & GYNECOLOGY

## 2021-02-26 ENCOUNTER — HOSPITAL ENCOUNTER (INPATIENT)
Facility: HOSPITAL | Age: 31
LOS: 3 days | Discharge: HOME | End: 2021-03-01
Attending: OBSTETRICS & GYNECOLOGY | Admitting: OBSTETRICS & GYNECOLOGY
Payer: COMMERCIAL

## 2021-02-26 ENCOUNTER — ANESTHESIA (INPATIENT)
Dept: OBSTETRICS AND GYNECOLOGY | Facility: HOSPITAL | Age: 31
End: 2021-02-26
Payer: COMMERCIAL

## 2021-02-26 ENCOUNTER — ANESTHESIA EVENT (INPATIENT)
Dept: OBSTETRICS AND GYNECOLOGY | Facility: HOSPITAL | Age: 31
End: 2021-02-26
Payer: COMMERCIAL

## 2021-02-26 PROBLEM — O42.90 PROM (PREMATURE RUPTURE OF MEMBRANES): Status: ACTIVE | Noted: 2021-02-26

## 2021-02-26 LAB
ABO + RH BLD: NORMAL
BLD GP AB SCN SERPL QL: NEGATIVE
D AG BLD QL: POSITIVE
ERYTHROCYTE [DISTWIDTH] IN BLOOD BY AUTOMATED COUNT: 13.4 % (ref 11.7–14.4)
HCT VFR BLDCO AUTO: 39.2 % (ref 35–45)
HGB BLD-MCNC: 13.3 G/DL (ref 11.8–15.7)
LABORATORY COMMENT REPORT: NORMAL
MCH RBC QN AUTO: 31.6 PG (ref 28–33.2)
MCHC RBC AUTO-ENTMCNC: 33.9 G/DL (ref 32.2–35.5)
MCV RBC AUTO: 93.1 FL (ref 83–98)
PDW BLD AUTO: 10.8 FL (ref 9.4–12.3)
PLATELET # BLD AUTO: 164 K/UL (ref 150–369)
RBC # BLD AUTO: 4.21 M/UL (ref 3.93–5.22)
SARS-COV-2 RNA RESP QL NAA+PROBE: NEGATIVE
T PALLIDUM AB SER QL IF: NONREACTIVE
WBC # BLD AUTO: 10.47 K/UL (ref 3.8–10.5)

## 2021-02-26 PROCEDURE — 25000000 HC PHARMACY GENERAL: Performed by: STUDENT IN AN ORGANIZED HEALTH CARE EDUCATION/TRAINING PROGRAM

## 2021-02-26 PROCEDURE — 63600000 HC DRUGS/DETAIL CODE: Performed by: NURSE PRACTITIONER

## 2021-02-26 PROCEDURE — 37000005 HC ANESTHESIA EPIDURAL/SPINAL: Performed by: OBSTETRICS & GYNECOLOGY

## 2021-02-26 PROCEDURE — 88307 TISSUE EXAM BY PATHOLOGIST: CPT | Performed by: OBSTETRICS & GYNECOLOGY

## 2021-02-26 PROCEDURE — 71000001 HC PACU PHASE 1 INITIAL 30MIN: Performed by: OBSTETRICS & GYNECOLOGY

## 2021-02-26 PROCEDURE — 59510 CESAREAN DELIVERY: CPT | Performed by: OBSTETRICS & GYNECOLOGY

## 2021-02-26 PROCEDURE — 36415 COLL VENOUS BLD VENIPUNCTURE: CPT | Performed by: OBSTETRICS & GYNECOLOGY

## 2021-02-26 PROCEDURE — 3E0P7VZ INTRODUCTION OF HORMONE INTO FEMALE REPRODUCTIVE, VIA NATURAL OR ARTIFICIAL OPENING: ICD-10-PCS | Performed by: OBSTETRICS & GYNECOLOGY

## 2021-02-26 PROCEDURE — 63600000 HC DRUGS/DETAIL CODE: Performed by: SPECIALIST

## 2021-02-26 PROCEDURE — 3E033VJ INTRODUCTION OF OTHER HORMONE INTO PERIPHERAL VEIN, PERCUTANEOUS APPROACH: ICD-10-PCS | Performed by: OBSTETRICS & GYNECOLOGY

## 2021-02-26 PROCEDURE — 63700000 HC SELF-ADMINISTRABLE DRUG: Performed by: STUDENT IN AN ORGANIZED HEALTH CARE EDUCATION/TRAINING PROGRAM

## 2021-02-26 PROCEDURE — 200200 PR NO CHARGE: Performed by: OBSTETRICS & GYNECOLOGY

## 2021-02-26 PROCEDURE — 72000021 HC C SECTION LEVEL 1: Performed by: OBSTETRICS & GYNECOLOGY

## 2021-02-26 PROCEDURE — 85027 COMPLETE CBC AUTOMATED: CPT | Performed by: OBSTETRICS & GYNECOLOGY

## 2021-02-26 PROCEDURE — 25000000 HC PHARMACY GENERAL: Performed by: SPECIALIST

## 2021-02-26 PROCEDURE — 63700000 HC SELF-ADMINISTRABLE DRUG: Performed by: OBSTETRICS & GYNECOLOGY

## 2021-02-26 PROCEDURE — 86901 BLOOD TYPING SEROLOGIC RH(D): CPT

## 2021-02-26 PROCEDURE — 12000000 HC ROOM AND CARE MED/SURG

## 2021-02-26 PROCEDURE — 63600000 HC DRUGS/DETAIL CODE: Performed by: STUDENT IN AN ORGANIZED HEALTH CARE EDUCATION/TRAINING PROGRAM

## 2021-02-26 PROCEDURE — 25000000 HC PHARMACY GENERAL: Performed by: NURSE PRACTITIONER

## 2021-02-26 PROCEDURE — U0002 COVID-19 LAB TEST NON-CDC: HCPCS | Performed by: OBSTETRICS & GYNECOLOGY

## 2021-02-26 PROCEDURE — 63600000 HC DRUGS/DETAIL CODE: Performed by: OBSTETRICS & GYNECOLOGY

## 2021-02-26 PROCEDURE — 86780 TREPONEMA PALLIDUM: CPT | Performed by: OBSTETRICS & GYNECOLOGY

## 2021-02-26 PROCEDURE — 71000011 HC PACU PHASE 1 EA ADDL MIN: Performed by: OBSTETRICS & GYNECOLOGY

## 2021-02-26 PROCEDURE — 25800000 HC PHARMACY IV SOLUTIONS: Performed by: SPECIALIST

## 2021-02-26 RX ORDER — ALUMINUM HYDROXIDE, MAGNESIUM HYDROXIDE, AND SIMETHICONE 1200; 120; 1200 MG/30ML; MG/30ML; MG/30ML
30 SUSPENSION ORAL EVERY 4 HOURS PRN
Status: DISCONTINUED | OUTPATIENT
Start: 2021-02-26 | End: 2021-03-01 | Stop reason: HOSPADM

## 2021-02-26 RX ORDER — OXYTOCIN/0.9 % SODIUM CHLORIDE 20/1000 ML
PLASTIC BAG, INJECTION (ML) INTRAVENOUS CONTINUOUS PRN
Status: DISCONTINUED | OUTPATIENT
Start: 2021-02-26 | End: 2021-02-26 | Stop reason: SURG

## 2021-02-26 RX ORDER — ONDANSETRON HYDROCHLORIDE 2 MG/ML
INJECTION, SOLUTION INTRAVENOUS AS NEEDED
Status: DISCONTINUED | OUTPATIENT
Start: 2021-02-26 | End: 2021-02-26 | Stop reason: SURG

## 2021-02-26 RX ORDER — EPHEDRINE SULFATE/0.9% NACL/PF 50 MG/5 ML
10 SYRINGE (ML) INTRAVENOUS ONCE
Status: DISCONTINUED | OUTPATIENT
Start: 2021-02-26 | End: 2021-02-26

## 2021-02-26 RX ORDER — ACETAMINOPHEN 650 MG/20.3ML
1000 LIQUID ORAL ONCE
Status: COMPLETED | OUTPATIENT
Start: 2021-02-26 | End: 2021-02-26

## 2021-02-26 RX ORDER — CEFAZOLIN SODIUM 2 G/50ML
2 SOLUTION INTRAVENOUS
Status: COMPLETED | OUTPATIENT
Start: 2021-02-26 | End: 2021-02-26

## 2021-02-26 RX ORDER — DIPHENHYDRAMINE HCL 50 MG/ML
12.5 VIAL (ML) INJECTION EVERY 6 HOURS PRN
Status: ACTIVE | OUTPATIENT
Start: 2021-02-26 | End: 2021-02-27

## 2021-02-26 RX ORDER — SODIUM CHLORIDE, SODIUM LACTATE, POTASSIUM CHLORIDE, CALCIUM CHLORIDE 600; 310; 30; 20 MG/100ML; MG/100ML; MG/100ML; MG/100ML
80 INJECTION, SOLUTION INTRAVENOUS CONTINUOUS
Status: ACTIVE | OUTPATIENT
Start: 2021-02-27 | End: 2021-02-27

## 2021-02-26 RX ORDER — DIPHENHYDRAMINE HCL 50 MG/ML
25 VIAL (ML) INJECTION EVERY 6 HOURS PRN
Status: DISCONTINUED | OUTPATIENT
Start: 2021-02-26 | End: 2021-03-01 | Stop reason: HOSPADM

## 2021-02-26 RX ORDER — METHYLERGONOVINE MALEATE 0.2 MG/ML
200 INJECTION INTRAVENOUS ONCE AS NEEDED
Status: CANCELLED | OUTPATIENT
Start: 2021-02-26

## 2021-02-26 RX ORDER — TERBUTALINE SULFATE 1 MG/ML
0.25 INJECTION SUBCUTANEOUS ONCE
Status: COMPLETED | OUTPATIENT
Start: 2021-02-26 | End: 2021-02-26

## 2021-02-26 RX ORDER — ONDANSETRON 4 MG/1
4 TABLET, ORALLY DISINTEGRATING ORAL EVERY 8 HOURS PRN
Status: DISCONTINUED | OUTPATIENT
Start: 2021-02-26 | End: 2021-03-01 | Stop reason: HOSPADM

## 2021-02-26 RX ORDER — DIPHENHYDRAMINE HCL 25 MG
25 CAPSULE ORAL EVERY 6 HOURS PRN
Status: DISCONTINUED | OUTPATIENT
Start: 2021-02-26 | End: 2021-03-01 | Stop reason: HOSPADM

## 2021-02-26 RX ORDER — KETOROLAC TROMETHAMINE 30 MG/ML
30 INJECTION, SOLUTION INTRAMUSCULAR; INTRAVENOUS
Status: DISCONTINUED | OUTPATIENT
Start: 2021-02-26 | End: 2021-02-28 | Stop reason: SDUPTHER

## 2021-02-26 RX ORDER — LIDOCAINE HYDROCHLORIDE 10 MG/ML
0-30 INJECTION, SOLUTION EPIDURAL; INFILTRATION; INTRACAUDAL; PERINEURAL ONCE AS NEEDED
Status: CANCELLED | OUTPATIENT
Start: 2021-02-26

## 2021-02-26 RX ORDER — ONDANSETRON 4 MG/1
8 TABLET, ORALLY DISINTEGRATING ORAL ONCE
Status: DISCONTINUED | OUTPATIENT
Start: 2021-02-26 | End: 2021-02-26

## 2021-02-26 RX ORDER — IBUPROFEN 600 MG/1
600 TABLET ORAL
Status: DISCONTINUED | OUTPATIENT
Start: 2021-03-01 | End: 2021-02-28 | Stop reason: SDUPTHER

## 2021-02-26 RX ORDER — SODIUM CHLORIDE, SODIUM LACTATE, POTASSIUM CHLORIDE, CALCIUM CHLORIDE 600; 310; 30; 20 MG/100ML; MG/100ML; MG/100ML; MG/100ML
125 INJECTION, SOLUTION INTRAVENOUS CONTINUOUS
Status: DISCONTINUED | OUTPATIENT
Start: 2021-02-26 | End: 2021-03-01 | Stop reason: HOSPADM

## 2021-02-26 RX ORDER — SODIUM CHLORIDE, SODIUM LACTATE, POTASSIUM CHLORIDE, CALCIUM CHLORIDE 600; 310; 30; 20 MG/100ML; MG/100ML; MG/100ML; MG/100ML
150 INJECTION, SOLUTION INTRAVENOUS CONTINUOUS
Status: DISCONTINUED | OUTPATIENT
Start: 2021-02-26 | End: 2021-03-01 | Stop reason: HOSPADM

## 2021-02-26 RX ORDER — METHYLERGONOVINE MALEATE 0.2 MG/ML
INJECTION INTRAVENOUS AS NEEDED
Status: DISCONTINUED | OUTPATIENT
Start: 2021-02-26 | End: 2021-02-26 | Stop reason: SURG

## 2021-02-26 RX ORDER — CARBOPROST TROMETHAMINE 250 UG/ML
250 INJECTION, SOLUTION INTRAMUSCULAR ONCE AS NEEDED
Status: CANCELLED | OUTPATIENT
Start: 2021-02-26

## 2021-02-26 RX ORDER — MISOPROSTOL 200 UG/1
1000 TABLET ORAL ONCE AS NEEDED
Status: COMPLETED | OUTPATIENT
Start: 2021-02-26 | End: 2021-02-26

## 2021-02-26 RX ORDER — OXYTOCIN/0.9 % SODIUM CHLORIDE 20/1000 ML
125 PLASTIC BAG, INJECTION (ML) INTRAVENOUS CONTINUOUS
Status: ACTIVE | OUTPATIENT
Start: 2021-02-26 | End: 2021-02-27

## 2021-02-26 RX ORDER — EPHEDRINE SULFATE/0.9% NACL/PF 50 MG/5 ML
5 SYRINGE (ML) INTRAVENOUS ONCE
Status: COMPLETED | OUTPATIENT
Start: 2021-02-26 | End: 2021-02-26

## 2021-02-26 RX ORDER — PHENYLEPHRINE HYDROCHLORIDE 10 MG/ML
INJECTION INTRAVENOUS AS NEEDED
Status: DISCONTINUED | OUTPATIENT
Start: 2021-02-26 | End: 2021-02-26 | Stop reason: SURG

## 2021-02-26 RX ORDER — ACETAMINOPHEN 650 MG/1
650 SUPPOSITORY RECTAL ONCE
Status: COMPLETED | OUTPATIENT
Start: 2021-02-26 | End: 2021-02-26

## 2021-02-26 RX ORDER — ACETAMINOPHEN 325 MG/1
975 TABLET ORAL ONCE
Status: COMPLETED | OUTPATIENT
Start: 2021-02-26 | End: 2021-02-26

## 2021-02-26 RX ORDER — MISOPROSTOL 100 UG/1
25 TABLET ORAL ONCE
Status: COMPLETED | OUTPATIENT
Start: 2021-02-26 | End: 2021-02-26

## 2021-02-26 RX ORDER — LIDOCAINE HCL/EPINEPHRINE/PF 2%-1:200K
VIAL (ML) INJECTION AS NEEDED
Status: DISCONTINUED | OUTPATIENT
Start: 2021-02-26 | End: 2021-02-26 | Stop reason: SURG

## 2021-02-26 RX ORDER — TRANEXAMIC ACID 10 MG/ML
1000 INJECTION, SOLUTION INTRAVENOUS ONCE AS NEEDED
Status: CANCELLED | OUTPATIENT
Start: 2021-02-26

## 2021-02-26 RX ORDER — OXYTOCIN/0.9 % SODIUM CHLORIDE 20/1000 ML
PLASTIC BAG, INJECTION (ML) INTRAVENOUS CONTINUOUS
Status: CANCELLED | OUTPATIENT
Start: 2021-02-26 | End: 2021-02-26

## 2021-02-26 RX ORDER — AMOXICILLIN 250 MG
1 CAPSULE ORAL 2 TIMES DAILY
Status: DISCONTINUED | OUTPATIENT
Start: 2021-02-26 | End: 2021-03-01 | Stop reason: HOSPADM

## 2021-02-26 RX ORDER — CALCIUM CARBONATE 200(500)MG
1000 TABLET,CHEWABLE ORAL ONCE
Status: COMPLETED | OUTPATIENT
Start: 2021-02-26 | End: 2021-02-26

## 2021-02-26 RX ORDER — OXYTOCIN/0.9 % SODIUM CHLORIDE 20/1000 ML
500 PLASTIC BAG, INJECTION (ML) INTRAVENOUS ONCE
Status: CANCELLED | OUTPATIENT
Start: 2021-02-26 | End: 2021-02-26

## 2021-02-26 RX ORDER — ONDANSETRON HYDROCHLORIDE 2 MG/ML
4 INJECTION, SOLUTION INTRAVENOUS EVERY 8 HOURS PRN
Status: DISCONTINUED | OUTPATIENT
Start: 2021-02-26 | End: 2021-03-01 | Stop reason: HOSPADM

## 2021-02-26 RX ORDER — SODIUM CHLORIDE, SODIUM LACTATE, POTASSIUM CHLORIDE, CALCIUM CHLORIDE 600; 310; 30; 20 MG/100ML; MG/100ML; MG/100ML; MG/100ML
125 INJECTION, SOLUTION INTRAVENOUS CONTINUOUS
Status: ACTIVE | OUTPATIENT
Start: 2021-02-26 | End: 2021-02-26

## 2021-02-26 RX ORDER — OXYTOCIN 10 [USP'U]/ML
10 INJECTION, SOLUTION INTRAMUSCULAR; INTRAVENOUS ONCE AS NEEDED
Status: CANCELLED | OUTPATIENT
Start: 2021-02-26

## 2021-02-26 RX ORDER — OXYCODONE HYDROCHLORIDE 5 MG/1
5-10 TABLET ORAL EVERY 4 HOURS PRN
Status: DISCONTINUED | OUTPATIENT
Start: 2021-02-26 | End: 2021-03-01 | Stop reason: HOSPADM

## 2021-02-26 RX ORDER — ACETAMINOPHEN 325 MG/1
975 TABLET ORAL
Status: DISCONTINUED | OUTPATIENT
Start: 2021-02-26 | End: 2021-03-01 | Stop reason: HOSPADM

## 2021-02-26 RX ORDER — LIDOCAINE HCL/EPINEPHRINE/PF 2%-1:200K
10 VIAL (ML) INJECTION ONCE
Status: COMPLETED | OUTPATIENT
Start: 2021-02-26 | End: 2021-02-26

## 2021-02-26 RX ORDER — MORPHINE SULFATE 0.5 MG/ML
INJECTION, SOLUTION EPIDURAL; INTRATHECAL; INTRAVENOUS AS NEEDED
Status: DISCONTINUED | OUTPATIENT
Start: 2021-02-26 | End: 2021-02-26 | Stop reason: SURG

## 2021-02-26 RX ORDER — OXYTOCIN 10 [USP'U]/ML
INJECTION, SOLUTION INTRAMUSCULAR; INTRAVENOUS AS NEEDED
Status: DISCONTINUED | OUTPATIENT
Start: 2021-02-26 | End: 2021-02-26 | Stop reason: SURG

## 2021-02-26 RX ORDER — CALCIUM CARBONATE 200(500)MG
500 TABLET,CHEWABLE ORAL EVERY 4 HOURS PRN
Status: DISCONTINUED | OUTPATIENT
Start: 2021-02-26 | End: 2021-03-01 | Stop reason: HOSPADM

## 2021-02-26 RX ORDER — EPHEDRINE SULFATE/0.9% NACL/PF 50 MG/5 ML
10 SYRINGE (ML) INTRAVENOUS 4 TIMES DAILY PRN
Status: DISCONTINUED | OUTPATIENT
Start: 2021-02-26 | End: 2021-02-26

## 2021-02-26 RX ORDER — CARBOPROST TROMETHAMINE 250 UG/ML
INJECTION, SOLUTION INTRAMUSCULAR AS NEEDED
Status: DISCONTINUED | OUTPATIENT
Start: 2021-02-26 | End: 2021-02-26 | Stop reason: SURG

## 2021-02-26 RX ORDER — MISOPROSTOL 200 UG/1
1000 TABLET ORAL ONCE AS NEEDED
Status: CANCELLED | OUTPATIENT
Start: 2021-02-26

## 2021-02-26 RX ORDER — OXYTOCIN/0.9 % SODIUM CHLORIDE 30/500 ML
0-20 PLASTIC BAG, INJECTION (ML) INTRAVENOUS CONTINUOUS
Status: DISCONTINUED | OUTPATIENT
Start: 2021-02-26 | End: 2021-03-01 | Stop reason: HOSPADM

## 2021-02-26 RX ORDER — SODIUM CITRATE AND CITRIC ACID MONOHYDRATE 334; 500 MG/5ML; MG/5ML
30 SOLUTION ORAL ONCE
Status: COMPLETED | OUTPATIENT
Start: 2021-02-26 | End: 2021-02-26

## 2021-02-26 RX ADMIN — MORPHINE SULFATE 2.5 MG: 0.5 INJECTION, SOLUTION EPIDURAL; INTRATHECAL; INTRAVENOUS at 16:27

## 2021-02-26 RX ADMIN — LIDOCAINE HYDROCHLORIDE AND EPINEPHRINE 5 ML: 20; 5 INJECTION, SOLUTION EPIDURAL; INFILTRATION; INTRACAUDAL; PERINEURAL at 16:18

## 2021-02-26 RX ADMIN — LIDOCAINE HYDROCHLORIDE AND EPINEPHRINE 5 ML: 20; 5 INJECTION, SOLUTION EPIDURAL; INFILTRATION; INTRACAUDAL; PERINEURAL at 16:15

## 2021-02-26 RX ADMIN — ACETAMINOPHEN 975 MG: 325 TABLET, FILM COATED ORAL at 16:02

## 2021-02-26 RX ADMIN — ACETAMINOPHEN 975 MG: 325 TABLET, FILM COATED ORAL at 22:06

## 2021-02-26 RX ADMIN — SODIUM CITRATE AND CITRIC ACID MONOHYDRATE 30 ML: 500; 334 SOLUTION ORAL at 16:03

## 2021-02-26 RX ADMIN — DOCUSATE SODIUM AND SENNOSIDES 1 TABLET: 8.6; 5 TABLET, FILM COATED ORAL at 22:06

## 2021-02-26 RX ADMIN — PHENYLEPHRINE HYDROCHLORIDE 100 MCG: 10 INJECTION INTRAVENOUS at 16:39

## 2021-02-26 RX ADMIN — ROPIVACAINE HYDROCHLORIDE 10 ML/HR: 10 INJECTION, SOLUTION EPIDURAL at 14:57

## 2021-02-26 RX ADMIN — Medication: at 16:26

## 2021-02-26 RX ADMIN — ROPIVACAINE HYDROCHLORIDE 10 ML/HR: 10 INJECTION, SOLUTION EPIDURAL at 11:04

## 2021-02-26 RX ADMIN — Medication 125 ML/HR: at 19:50

## 2021-02-26 RX ADMIN — CARBOPROST TROMETHAMINE 250 MCG: 250 INJECTION, SOLUTION INTRAMUSCULAR at 16:31

## 2021-02-26 RX ADMIN — PHENYLEPHRINE HYDROCHLORIDE 100 MCG: 10 INJECTION INTRAVENOUS at 16:34

## 2021-02-26 RX ADMIN — AZITHROMYCIN DIHYDRATE 500 MG: 500 INJECTION, POWDER, LYOPHILIZED, FOR SOLUTION INTRAVENOUS at 16:19

## 2021-02-26 RX ADMIN — LIDOCAINE HYDROCHLORIDE AND EPINEPHRINE 5 ML: 20; 5 INJECTION, SOLUTION EPIDURAL; INFILTRATION; INTRACAUDAL; PERINEURAL at 16:22

## 2021-02-26 RX ADMIN — SODIUM CHLORIDE, POTASSIUM CHLORIDE, SODIUM LACTATE AND CALCIUM CHLORIDE 1000 ML: 600; 310; 30; 20 INJECTION, SOLUTION INTRAVENOUS at 09:48

## 2021-02-26 RX ADMIN — KETOROLAC TROMETHAMINE 30 MG: 30 INJECTION, SOLUTION INTRAMUSCULAR; INTRAVENOUS at 18:52

## 2021-02-26 RX ADMIN — CEFAZOLIN SODIUM 2 G: 2 SOLUTION INTRAVENOUS at 16:00

## 2021-02-26 RX ADMIN — ANTACID TABLETS 1000 MG: 500 TABLET, CHEWABLE ORAL at 14:39

## 2021-02-26 RX ADMIN — ONDANSETRON HYDROCHLORIDE 4 MG: 2 SOLUTION INTRAMUSCULAR; INTRAVENOUS at 16:38

## 2021-02-26 RX ADMIN — MISOPROSTOL 25 MCG: 100 TABLET ORAL at 05:39

## 2021-02-26 RX ADMIN — OXYTOCIN-SODIUM CHLORIDE 0.9% IV SOLN 30 UNIT/500ML 2 MILLI-UNITS/MIN: 30-0.9/5 SOLUTION at 15:01

## 2021-02-26 RX ADMIN — CARBOPROST TROMETHAMINE 250 MCG: 250 INJECTION, SOLUTION INTRAMUSCULAR at 16:50

## 2021-02-26 RX ADMIN — OXYTOCIN 10 UNITS: 10 INJECTION, SOLUTION INTRAMUSCULAR; INTRAVENOUS at 16:34

## 2021-02-26 RX ADMIN — METHYLERGONOVINE MALEATE 0.2 MG: 0.2 INJECTION, SOLUTION INTRAMUSCULAR; INTRAVENOUS at 16:31

## 2021-02-26 RX ADMIN — OXYTOCIN-SODIUM CHLORIDE 0.9% IV SOLN 30 UNIT/500ML 2 MILLI-UNITS/MIN: 30-0.9/5 SOLUTION at 10:01

## 2021-02-26 RX ADMIN — Medication 5 MG: at 12:01

## 2021-02-26 RX ADMIN — MISOPROSTOL 1000 MCG: 200 TABLET ORAL at 17:10

## 2021-02-26 RX ADMIN — TERBUTALINE SULFATE 0.25 MG: 1 INJECTION SUBCUTANEOUS at 14:15

## 2021-02-26 NOTE — PLAN OF CARE
Problem: Adult Inpatient Plan of Care  Goal: Plan of Care Review  Outcome: Progressing  Goal: Patient-Specific Goal (Individualization)  Outcome: Progressing  Goal: Absence of Hospital-Acquired Illness or Injury  Outcome: Progressing  Goal: Optimal Comfort and Wellbeing  Outcome: Progressing  Goal: Readiness for Transition of Care  Outcome: Progressing  Goal: Rounds/Family Conference  Outcome: Progressing     Problem: Bleeding (Labor)  Goal: Hemostasis  Outcome: Progressing     Problem: Change in Fetal Wellbeing (Labor)  Goal: Stable Fetal Wellbeing  Outcome: Progressing     Problem: Delayed Labor Progression (Labor)  Goal: Effective Progression to Delivery  Outcome: Progressing     Problem: Infection (Labor)  Goal: Absence of Infection Signs/Symptoms  Outcome: Progressing     Problem: Labor Pain (Labor)  Goal: Acceptable Pain Control  Outcome: Progressing     Problem: Uterine Tachysystole (Labor)  Goal: Normal Uterine Contraction Pattern  Outcome: Progressing

## 2021-02-26 NOTE — ANESTHESIA PREPROCEDURE EVALUATION
Anesthesia ROS/MED HX    Anesthesia History    Previous anesthetics  Pulmonary - neg  Neuro/Psych    Anxiety  Cardiovascular- neg  Hematological - neg  GI/Hepatic- neg  Musculoskeletal- neg  Renal Disease- neg  Endo/Other- neg       Past Medical History:   Diagnosis Date   • Anxiety     used to see a therapist, previously on lexapro     Past Surgical History:   Procedure Laterality Date   • TONSILLECTOMY & ADENOIDECTOMY  2012       Relevant Problems   Other   (+) HPV (human papilloma virus) infection     Patient Active Problem List   Diagnosis   • HPV (human papilloma virus) infection   • Pregnancy   • Low-lying placenta   • PROM (premature rupture of membranes)       Current Facility-Administered Medications   Medication Dose Route Frequency   • epHEDrine  10 mg intravenous 4x daily PRN   • fentaNYL-ROPivacaine-NaCl (PF)  10 mL/hr epidural Continuous   • lactated ringer's  1,000 mL intravenous Once    Followed by   • lactated ringer's  125 mL/hr intravenous Continuous   • lactated ringer's  125 mL/hr intravenous Continuous    And   • oxytocin in 0.9 % NSS  0-20 wendy-units/min intravenous Continuous   • lidocaine-epinephrine (PF)  10 mL infiltration Once       Prior to Admission medications    Medication Sig Start Date End Date Taking? Authorizing Provider   prenatal vit-iron fum-folic ac 27 mg iron- 0.8 mg tablet tablet Take 1 tablet by mouth daily.   Yes Provider, Cong, MD       CBC Results       02/26/21 12/02/20 07/29/20                    0315 0808 0731         WBC 10.47 9.9 7.4         RBC 4.21 3.51 4.17         HGB 13.3 11.4 12.9         HCT 39.2 33.6 38.6         MCV 93.1 96 93         MCH 31.6 32.5 30.9         MCHC 33.9 33.9 33.4          157 203                       Physical Exam    Airway   Mallampati: II   TM distance: <3 FB   Neck ROM: full  Cardiovascular - normal   Rhythm: regular   Rate: normalPulmonary - normal   clear to auscultation  Other Findings   Back - neg   landmarks  identified    Dental - normal        Anesthesia Plan    Plan: labor epidural    Technique: epidural   ASA 2  Anesthetic plan and risks discussed with: patient  Postop Plan:   Pain Management: epidural

## 2021-02-26 NOTE — PROGRESS NOTES
Labor and Delivery Progress Note    Subjective     Patient uncomfortable    Objective     Vital Signs for the last 24 hours:  Temp:  [36.8 °C (98.2 °F)-36.9 °C (98.4 °F)] 36.9 °C (98.4 °F)  Heart Rate:  [80-99] 80  Resp:  [16-20] 16  BP: (110-132)/(75-78) 110/75     Fetal Monitoring:  FHR Baseline: 130  FHR Variability: moderate  FHR Accelerations: present  FHR Decelerations: absent    Contraction Frequency: q2-4    Latest cervical exam:  80/-2     Vaginal Bleeding: not present (21 0530)      Current Facility-Administered Medications:   •  lactated ringer's bolus 1,000 mL, 1,000 mL, intravenous, Once **FOLLOWED BY** lactated ringer's infusion, 125 mL/hr, intravenous, Continuous, Willis Nelson MD    Assessment/Plan     Rosamaria Dunne is a 30 y.o. female  at 39w4d admitted with induction of labor /PROM    -FHR: Category I  -GBS: negative   -IOL 2/2 PROM: s/p cytotec x1. Pt making excellent cervical change to 4/80/-2. Epidural prn. Will start pitocin for continued active management. Will reassess in two hours or prn    Discussed with SAM Vila

## 2021-02-26 NOTE — PROGRESS NOTES
Labor and Delivery Progress Note    Subjective   Assuming care of pt.    Patient comfortable.    Objective     Vital Signs for the last 24 hours:  Temp:  [36.8 °C (98.2 °F)-36.8 °C (98.3 °F)] 36.8 °C (98.2 °F)  Heart Rate:  [80-99] 80  Resp:  [18-20] 20  BP: (110-132)/(75-78) 110/75     Fetal Monitoring:  FHR Baseline: 135  FHR Variability: moderate  FHR Accelerations: present  FHR Decelerations: absent     Contraction Frequency: q 2-6 min    Latest cervical exam:  Cervical Dilation (cm): 2  Cervical Effacement: 80  Fetal Station: -3  Method: sterile exam per physician (21 0530)    Assessment/Plan     Rosamaria Dunne is a 30 y.o.  at 39w4d admitted with induction of labor, PROM     1. FHR: Category I  2. GBS: negative  3. IOL 2/2 PROM: s/p cytotec at 0530, for recheck at 0930. Epidural prn.    Roxann Marquis DO

## 2021-02-26 NOTE — NURSING NOTE
Dr. Bah at bedside. Aware of prolonged deceleration in FHR tracing. Made aware of maternal hypotension.

## 2021-02-26 NOTE — ANESTHESIOLOGIST PRE-PROCEDURE ATTESTATION
Pre-Procedure Patient Identification:  I am the Primary Anesthesiologist and have identified the patient on 02/26/21 at 9:52 AM.   I have confirmed the following procedure: Epidural

## 2021-02-26 NOTE — H&P
"  HPI     Rosamaria Dunne is a 30 y.o.  at 39w4d with an estimated due date of 3/1/2021, by Last Menstrual Period who ***      Pregnancy complicated by:    OB History:   OB History    Para Term  AB Living   2 0 0 0 1 0   SAB TAB Ectopic Multiple Live Births   0 0 0 0 0      # Outcome Date GA Lbr Andrea/2nd Weight Sex Delivery Anes PTL Lv   2 Current            1 AB                 GYN History: Denies history of {Blank multiple:::\"fibroids\",\"cysts\",\"polyps\",\"sexually transmitted infections\",\"abnormal pap smears\"}    Medical History:   Past Medical History:   Diagnosis Date   • Anxiety     used to see a therapist, previously on lexapro     Denies history of: {Blank multiple:::\"asthma\",\"kidney disease\",\"liver disease\",\"seizures\",\"blood disorder\",\"hypertension\",\"diabetes\"}    Surgical History:   Past Surgical History:   Procedure Laterality Date   • TONSILLECTOMY & ADENOIDECTOMY         Allergies: Patient has no known allergies.    Home Medications:   Prior to Admission medications    Medication Sig Start Date End Date Taking? Authorizing Provider   prenatal vit-iron fum-folic ac 27 mg iron- 0.8 mg tablet tablet Take 1 tablet by mouth daily.   Yes Provider, MD Cong       Objective     Vital Signs for the last 24 hours:       Latest cervical exam:                Additional Tests:   Sterile Speculum exam: ***    Fetal Monitoring:  FHR Baseline: ***  FHR Variability: ***  FHR Accelerations: ***  FHR Decelerations: ***    Contraction Frequency: ***    Exam:  General appearance: alert, no acute distress  Cardiac: Normal heart sounds  Pulmonary: CTAB, no increased respiratory effort  Abdomen: gravid, nontender  Female genitalia: see cervical exam.  Extremities: {Blank single:::\"no\",\"+1\",\"+2\" \"***\"} lower extremity edema     Bedside Ultrasounds:   {obgyn fetal presentation:141175}  MARCIE ***    Labs:  · Blood type: {Blank " "single:::\"O-\",\"A+\",\"A-\",\"B+\",\"B-\",\"AB+\",\"AB-\",\"pending\",\"unknown\",\"not done\",\"O+\"}   · RPR: {Blank single:::\"previously negative and repeat pending\",\"positive\",\"pending\",\"unknown\",\"not done\",\"negative\"}   · Syphilis Antibody: {Blank single:::\"previously negative and repeat pending\",\"positive\",\"pending\",\"unknown\",\"not done\",\"negative\"}   · HepBsAg: {Blank single:::\"positive\",\"pending\",\"unknown\",\"not done\",\"negative\"}   · Rubella: {Blank single:::\"equivocal\",\"non-immune\",\"pending\",\"unknown\",\"not done\",\"immune\"}   · Rubeola: {Blank single:::\"equivocal\",\"non-immune\",\"pending\",\"unknown\",\"not done\",\"immune\"}   · Varicella: {Blank single:::\"equivocal\",\"non-immune\",\"pending\",\"unknown\",\"not done\",\"immune\"}    · HIV: {Blank single:::\"positive\",\"pending\",\"unknown\",\"not done\",\"negative\"}  · Glucose tolerance testing: {Blank single:::\"1 hour abnormal but 3 hour normal\",\"abnormal 1 and 3 hour\",\"pending\",\"unknown\",\"not done\",\"normal\"}  · Group B Strep: {Blank single:::\"positive\",\"pending\",\"unknown\",\"not done\",\"negative\"}  · Gonorrhea: {Blank single:::\"positive\",\"pending\",\"unknown\",\"not done\",\"negative\"}  · Chlamydia: {Blank single:::\"positive\",\"pending\",\"unknown\",\"not done\",\"negative\"}    Assessment/Plan     Rosamaria Dunne is a 30 y.o.  at 39w4d, ***    - ***  - FHT: {FHR Tracing Category:81660}  - GBS: {positive/negative/pending/unknown:14289}    ***  Willis Nelson MD    "

## 2021-02-26 NOTE — PROGRESS NOTES
Labor and Delivery Progress Note    Subjective     Patient seen and examined for prolonged deceleration. Patient comfortable with epidural.     Objective     Vital Signs for the last 24 hours:  Temp:  [36.6 °C (97.8 °F)-36.9 °C (98.4 °F)] 36.6 °C (97.8 °F)  Heart Rate:  [] 90  Resp:  [16-20] 16  BP: (102-132)/(59-78) 111/68     Fetal Monitoring:  FHR Baseline: 125  FHR Variability: moderate prior to deceleration, minimal following   FHR Accelerations: present  FHR Decelerations: prolonged 4 minute deceleration with hermelindo to 65 bpm    Contraction Frequency: q1-4mins       Latest cervical exam:  Cervical Dilation (cm): 4  Cervical Effacement: 80  Fetal Station: -2  Method: sterile exam per physician (21 1150)    Vaginal Bleeding: bloody show (21 0948)      Current Facility-Administered Medications:   •  ePHEDrine sulfate-0.9%NaCl(PF) 50 mg/5 mL (10 mg/mL) injection 10 mg, 10 mg, intravenous, 4x daily PRN, Paulie Hernández MD  •  fentaNYL-ROPivacaine-NaCl (PF) 2-0.15 mcg/mL-% PCEA syringe, 10 mL/hr, epidural, Continuous, Paulie Hernández MD, Last Rate: 10 mL/hr at 21 1110, 10 mL/hr at 21 1110  •  [COMPLETED] lactated ringer's bolus 1,000 mL, 1,000 mL, intravenous, Once, Stopped at 21 1015 **FOLLOWED BY** lactated ringer's infusion, 125 mL/hr, intravenous, Continuous, Willis Nelson MD  •  lactated ringer's infusion, 125 mL/hr, intravenous, Continuous, Last Rate: 125 mL/hr at 21 1015, 125 mL/hr at 21 1015 **AND** oxytocin in 0.9 % NSS (PITOCIN) 30 unit/500 mL infusion, 0-20 wendy-units/min, intravenous, Continuous, Alyssia Mckenna CRNP, Last Rate: 4 mL/hr at 21 1130, 4 wendy-units/min at 21 1130  •  lidocaine-epinephrine (PF) (XYLOCAINE W/EPI) 2 %-1:200,000 injection 10 mL, 10 mL, infiltration, Once, Paulie Hernández MD    Assessment/Plan     Rosamaria Dunne is a 30 y.o. female  at 39w4d admitted with induction of labor 2/2 PROM    1.  FHR: Category II 2/2 prolonged deceleration as described above likely 2/2 tetanic uterine contraction. Resolution with discontinuation of Pitocin and maternal position change to right lateral. Mildly hypotensive to 80s/50s so also giving 5mg of ephedrine x1.   2. GBS: negative  3. IOL: Cervical exam remains unchanged. Will restart Pitocin after 15-20 minutes without deceleration    Plan of care discussed with Dr. Kandis Bah, DO

## 2021-02-26 NOTE — PROGRESS NOTES
Labor and Delivery Progress Note    Subjective     Patient comfortable, not feeling any contractions.    Objective     Temp:  [36.8 °C (98.3 °F)] 36.8 °C (98.3 °F)  Heart Rate:  [99] 99  Resp:  [18] 18  BP: (132)/(78) 132/78    Fetal Monitoring:  FHR Baseline: 130  FHR Variability: Moderate  FHR Accelerations: Present  FHR Decelerations: Absent    Contraction Frequency: q3-5min (not appreciated by patient)    Latest cervical exam:  Cervical Dilation (cm): 2  Cervical Effacement: 80  Fetal Station: -3  Method: sterile exam per physician (21 0259)    Assessment/Plan     Rosamaria Dunne is a 30 y.o.  at 39w4d admitted for IOL secondary to prelabor rupture of membranes.    1. FHR: Category I  2. GBS: negative  3. IOL: CVE 2/80/-3, unchanged from prior. Comfortable without appreciable contractions. Will begin induction for prelabor rupture of membranes with 25mcg buccal cytotec. Re-evaluate in 4 hours or as clinically indicated.     Discussed with Dr. Rainey.    Willis Nelson MD

## 2021-02-26 NOTE — PROGRESS NOTES
Spoke with pt and . There have been several prolonged decelerations, each have occurred after starting pitocin. FHT currently back to baseline again. Cvx remains unchanged. Labor is not progressing. We are unable to adequately augment her labor given fetal intolerance to ctx. Recommended  delivery. Risks, benefits of continuing trial of labor vs  delivery reviewed. Pt agrees to proceed with  section now.

## 2021-02-26 NOTE — H&P
HPI     Rosamaria Dunne is a 30 y.o.  at 39w4d with an estimated due date of 3/1/2021, by Last Menstrual Period who presents with LOF. She states that she first noticed a trickle of fluid around 0100. She continued to have leakage of clear fluid. She denies vaginal bleeding. Denies contractions. Appreciates good FM.     Pregnancy complicated by:  1. Low lying placenta that has since resolved    OB History:   OB History    Para Term  AB Living   2 0 0 0 1 0   SAB TAB Ectopic Multiple Live Births   0 0 0 0 0      # Outcome Date GA Lbr Andrea/2nd Weight Sex Delivery Anes PTL Lv   2 Current            1 AB                 GYN History: Denies history of fibroids, cysts, polyps and sexually transmitted infections had an HPV positive pap this pregnancy     Medical History:   Past Medical History:   Diagnosis Date   • Anxiety     used to see a therapist, previously on lexapro     Denies history of: asthma, kidney disease, liver disease, seizures, blood disorder, hypertension and diabetes    Surgical History:   Past Surgical History:   Procedure Laterality Date   • TONSILLECTOMY & ADENOIDECTOMY         Allergies: Patient has no known allergies.    Home Medications:   Prior to Admission medications    Medication Sig Start Date End Date Taking? Authorizing Provider   prenatal vit-iron fum-folic ac 27 mg iron- 0.8 mg tablet tablet Take 1 tablet by mouth daily.   Yes Provider, MD Cong       Objective     Vital Signs for the last 24 hours:  Temp:  [36.8 °C (98.3 °F)] 36.8 °C (98.3 °F)  Heart Rate:  [99] 99  Resp:  [18] 18  BP: (132)/(78) 132/78    Latest cervical exam:  Cervical Dilation (cm): 2  Cervical Effacement: 80  Fetal Station: -3  Method: sterile exam per physician (21 [id number])    Additional Tests:   Grossly ruptured for clear fluid, +nitrazine and + ferning     Fetal Monitoring:  FHR Baseline: 130  FHR Variability: mod shania  FHR Accelerations: present  FHR Decelerations: no  decels    Contraction Frequency: q2-6    Exam:  General appearance: alert, no acute distress  Cardiac: Normal heart sounds  Pulmonary: CTAB, no increased respiratory effort  Abdomen: gravid, nontender  Female genitalia: see cervical exam.  Extremities: no lower extremity edema     Bedside Ultrasounds:   cephalic    Labs:  · Blood type: O+   · RPR: negative   · HepBsAg: negative   · Rubella: immune   · Varicella: immune    · HIV: negative  · Glucose tolerance testin, passes with 135 cutoff   · Group B Strep: negative  · Gonorrhea: negative  · Chlamydia: negative    Assessment/Plan     Rosamaria Dunne is a 30 y.o.  at 39w4d, PROM    - PROM: pt grossly ruptured for clear fluid. Will admit to L&D and draw admission labs. Patient is comfortable, however has contractions on monitor. Will plan to recheck in 2 hours and consider induction with cytotec if remains unchanged.   - FHT: Category I  - GBS: negative    Discussed with Dr. Redd Aranda, DO

## 2021-02-26 NOTE — ANESTHESIA POSTPROCEDURE EVALUATION
Patient: Rosamaria Dunne    Procedure Summary     Date: 21 Room / Location: [facility] / [facility]    Anesthesia Start: 1117 Anesthesia Stop:     Procedure:  SECTION (Bilateral ) Diagnosis: (Other (Add Comments))    Surgeon: Roxann Marquis DO Responsible Provider: Paulie Hernández MD    Anesthesia Type: labor epidural ASA Status: 2          Anesthesia Type: labor epidural  PACU Vitals     No data found in the last 10 encounters.            Anesthesia Post Evaluation    Pain management: satisfactory to patient  Mode of pain management: epidural  Patient location: LDR 2.  Patient participation: complete - patient participated  Level of consciousness: awake and alert  Cardiovascular status: acceptable  Airway Patency: adequate and patent  Respiratory status: nonlabored ventilation, spontaneous ventilation and room air  Anesthetic complications: no

## 2021-02-26 NOTE — PROGRESS NOTES
Labor and Delivery Progress Note    Subjective     Patient seen and examined for prolonged deceleration. Patient comfortable with epidural.     Objective     Vital Signs for the last 24 hours:  Temp:  [36.6 °C (97.8 °F)-36.9 °C (98.4 °F)] 36.6 °C (97.8 °F)  Heart Rate:  [] 86  Resp:  [16-20] 16  BP: ()/(45-78) 98/45     Fetal Monitoring:  FHR Baseline: 130  FHR Variability: moderate prior to deceleration, minimal following   FHR Accelerations: present just prior to deceleration  FHR Decelerations: prolonged 8 minute deceleration with hermelindo to 60 bpm    Contraction Frequency: q2mins       Latest cervical exam:  Cervical Dilation (cm): 6  Cervical Effacement: 90  Fetal Station: -1  Method: sterile exam per physician(Dr. Bah) (21 1404)    Vaginal Bleeding: bloody show (21 1404)      Current Facility-Administered Medications:   •  ePHEDrine sulfate-0.9%NaCl(PF) 50 mg/5 mL (10 mg/mL) injection 10 mg, 10 mg, intravenous, 4x daily PRN, Paulie Hernández MD  •  fentaNYL-ROPivacaine-NaCl (PF) 2-0.15 mcg/mL-% PCEA syringe, 10 mL/hr, epidural, Continuous, Paulie Hernández MD, Last Rate: 10 mL/hr at 21 1110, 10 mL/hr at 21 1110  •  [COMPLETED] lactated ringer's bolus 1,000 mL, 1,000 mL, intravenous, Once, Stopped at 21 1015 **FOLLOWED BY** lactated ringer's infusion, 125 mL/hr, intravenous, Continuous, Willis Nelson MD  •  lactated ringer's infusion, 125 mL/hr, intravenous, Continuous, Last Rate: 125 mL/hr at 21 1015, 125 mL/hr at 21 1015 **AND** oxytocin in 0.9 % NSS (PITOCIN) 30 unit/500 mL infusion, 0-20 wendy-units/min, intravenous, Continuous, Alyssia Mckenna CRNP, Last Rate: 6 mL/hr at 21 1344, 6 wendy-units/min at 21 1344  •  lidocaine-epinephrine (PF) (XYLOCAINE W/EPI) 2 %-1:200,000 injection 10 mL, 10 mL, infiltration, Once, Paulie Hernández MD    Assessment/Plan     Rosamaria Dunne is a 30 y.o. female  at 39w4d admitted with  induction of labor 2/2 PROM    1. FHR: Category II 2/2 prolonged deceleration as described above 2/2 tachysystole. Resolution with immediate discontinuation of Pitocin and administration of Terbutaline, maternal position change to hands and knees.   2. GBS: negative  3. IOL: Cervical exam with change to 6/90/-1. Will keep Pitocin off while fetus recovers from deceleration.     Plan of care discussed with Dr. Marquis via Dr. Rachelle Bah DO

## 2021-02-26 NOTE — OP NOTE
OB  Delivery OP Note    Date of Procedure: 2021  Patient:Rosamaria Dunne  :1990    Procedure:     SECTION  CPT(R) Code:  24133 - [place]  DELIVERY ONLY    Review the Delivery Report for details.      Details    Pre-Op Diagnosis: 1. 30 y.o.  Intrauterine pregnancy at 39w4d  2. Induction of labor secondary to pre-labor rupture of membranes  3. GBS Negative  4. Non-reassuring fetal heart tones   Post-Op Diagnosis: 1. Same as above with delivery of viable Male   2. Uterine atony  3. Post-partum hemorrhage   Procedure: primary  , Low Transverse  via low transverse uterine incision and Pfannenstiel skin incision with B-Slaughter Sutures.    Anesthesia: Epidural    Findings: Normal uterus, tubes, and ovaries.   EBL  1000 mL   Complications: uterine atony, PPH     Specimen Information:  * No specimens in log *  Drains: Mckeon draining clear    Staff:  Surgeon(s):  Roxann Marquis DO Dalal, Shana, MD  Anesthesiologist: Paulie Hernández MD; Luis Smith MD   Circulator: Arlin Espinosa RN  Scrub Person: Princess Pruitt  Baby Nurse: Hilary Preciado RN  Other Staff:  GRZEGORZ BOB;ARLIN ESPINOSA;HILARY PRECIAOD  Delivery Assist;Delivery Nurse;Nursery Nurse     INFANT INFORMATION  Time of Birth:4:25 PM   Presentation: Cephalic  Position: Occiput Posterior  Cord: 3 vessels ,Complications:Nuchal    Sex: male   [place] Weight: 3.88 kg (8 lb 8.9 oz)      1 Minute 5 Minute 10 Minute   Apgar Totals: 8   9            Informed Consent:  The patient presented with pre-labor rupture of membranes. Her labor was induced Cytotec. Her labor was then augmented with Pitocin. Non-reassuring fetal heart tones were noted. Resuscitative measures were employed with positional changes, fluid bolus, and Terbutaline. Labor was unable to be augmented with Pitocin. Discussed with patient and reviewed risks and benefits. Patient agreed to primary   section. An informed consent was obtained.    Procedure Details:  The patient was taken to the operating room where Epidural  anesthesia was bolused and found to be adequate. Antibiotics were given for infection prophylaxis, including Ancef and Zithromax. The patient was prepped and draped in the normal sterile fashion.  A timeout was performed.  A Pfannenstiel skin incision was made with the scalpel. The incision was carried down to the fascia using the bovie. The fascia was incised and this was extended laterally with the bovie. The fascia was grasped with Kocher clamps and the underlying rectus muscle was dissected off.  The rectus muscles were  bluntly. The peritoneum was identified and entered bluntly. The peritoneum was dissected to allow for adequate visualization.    The bladder blade was inserted. The lower uterine segment was then incised with a low transverse  incision and was extended bluntly. The amniotic sac was ruptured and Clear  fluid noted. The bladder blade was removed and the infant's head was delivered atraumatically using the usual maneuvers. The remainder of the infant was delivered, a spontaneous cry was heard, and the infant appeared to be moving all 4 extremities. The cord clamped and cut, and the baby was handed off to the awaiting clinicians and neonatology staff.    The placenta was removed manually. The uterus was then exteriorized and cleared of all clots and debris. Uterine atony was noted at this time. An additional dose of Pitocin was given. The hysterotomy was repaired with #1 Vicryl in a running locked fashion and good hemostasis was observed. Atony was still noted so a dose of Methergine and 2 doses of Hemabate were given. The decision was made to perform a B-Slaughter stitch with 0- Chromic. The ovaries and tubes appeared normal bilaterally. The uterus was then returned to the abdomen. The uterine incision was reinspected and found to be hemostatic. The gutters  were inspected and cleared of all debris. The fascia was then reapproximated with a running suture of #1 Vicryl. The subcutaneous tissue was re-approximated with 3-0 Vicryl rapide in an interrupted fashion. The skin was closed with 4-0 Monocryl.    Bimanual exam was performed afterwards. A total of 1000 mcg of Cytotec was placed rectally. Atony was noted to be improved.     The patient tolerated the procedure well. The sponge, instrument, and needle counts were correct and the patient was taken to recovery in stable condition.    Dr. Marquis was present and scrubbed for the entire procedure.    Dona Bush MD

## 2021-02-27 LAB
ERYTHROCYTE [DISTWIDTH] IN BLOOD BY AUTOMATED COUNT: 13.6 % (ref 11.7–14.4)
HCT VFR BLDCO AUTO: 28.9 % (ref 35–45)
HGB BLD-MCNC: 9.6 G/DL (ref 11.8–15.7)
MCH RBC QN AUTO: 31.7 PG (ref 28–33.2)
MCHC RBC AUTO-ENTMCNC: 33.2 G/DL (ref 32.2–35.5)
MCV RBC AUTO: 95.4 FL (ref 83–98)
PDW BLD AUTO: 11.4 FL (ref 9.4–12.3)
PLATELET # BLD AUTO: 124 K/UL (ref 150–369)
RBC # BLD AUTO: 3.03 M/UL (ref 3.93–5.22)
WBC # BLD AUTO: 13.68 K/UL (ref 3.8–10.5)

## 2021-02-27 PROCEDURE — 36415 COLL VENOUS BLD VENIPUNCTURE: CPT | Performed by: STUDENT IN AN ORGANIZED HEALTH CARE EDUCATION/TRAINING PROGRAM

## 2021-02-27 PROCEDURE — 85027 COMPLETE CBC AUTOMATED: CPT | Performed by: STUDENT IN AN ORGANIZED HEALTH CARE EDUCATION/TRAINING PROGRAM

## 2021-02-27 PROCEDURE — 63700000 HC SELF-ADMINISTRABLE DRUG: Performed by: STUDENT IN AN ORGANIZED HEALTH CARE EDUCATION/TRAINING PROGRAM

## 2021-02-27 PROCEDURE — 63600000 HC DRUGS/DETAIL CODE: Performed by: STUDENT IN AN ORGANIZED HEALTH CARE EDUCATION/TRAINING PROGRAM

## 2021-02-27 PROCEDURE — 12000000 HC ROOM AND CARE MED/SURG

## 2021-02-27 RX ADMIN — KETOROLAC TROMETHAMINE 30 MG: 30 INJECTION, SOLUTION INTRAMUSCULAR; INTRAVENOUS at 18:50

## 2021-02-27 RX ADMIN — PRENATAL VITAMINS-IRON FUMARATE 27 MG IRON-FOLIC ACID 0.8 MG TABLET 1 TABLET: at 08:46

## 2021-02-27 RX ADMIN — KETOROLAC TROMETHAMINE 30 MG: 30 INJECTION, SOLUTION INTRAMUSCULAR; INTRAVENOUS at 12:52

## 2021-02-27 RX ADMIN — DOCUSATE SODIUM AND SENNOSIDES 1 TABLET: 8.6; 5 TABLET, FILM COATED ORAL at 08:46

## 2021-02-27 RX ADMIN — KETOROLAC TROMETHAMINE 30 MG: 30 INJECTION, SOLUTION INTRAMUSCULAR; INTRAVENOUS at 01:18

## 2021-02-27 RX ADMIN — KETOROLAC TROMETHAMINE 30 MG: 30 INJECTION, SOLUTION INTRAMUSCULAR; INTRAVENOUS at 06:46

## 2021-02-27 RX ADMIN — SODIUM CHLORIDE, POTASSIUM CHLORIDE, SODIUM LACTATE AND CALCIUM CHLORIDE 80 ML/HR: 600; 310; 30; 20 INJECTION, SOLUTION INTRAVENOUS at 01:00

## 2021-02-27 RX ADMIN — ACETAMINOPHEN 975 MG: 325 TABLET, FILM COATED ORAL at 09:57

## 2021-02-27 RX ADMIN — ACETAMINOPHEN 975 MG: 325 TABLET, FILM COATED ORAL at 16:03

## 2021-02-27 RX ADMIN — ACETAMINOPHEN 975 MG: 325 TABLET, FILM COATED ORAL at 04:21

## 2021-02-27 RX ADMIN — ALUMINUM HYDROXIDE, MAGNESIUM HYDROXIDE, AND SIMETHICONE 30 ML: 200; 200; 20 SUSPENSION ORAL at 16:03

## 2021-02-27 RX ADMIN — ACETAMINOPHEN 975 MG: 325 TABLET, FILM COATED ORAL at 22:07

## 2021-02-27 RX ADMIN — DOCUSATE SODIUM AND SENNOSIDES 1 TABLET: 8.6; 5 TABLET, FILM COATED ORAL at 20:17

## 2021-02-27 NOTE — PROGRESS NOTES
Patient: Rosamaria Dunne  Procedure(s):   SECTION  Location: [facility] L&D OR    Last vitals:   Vitals:    21 0800   BP: (!) 88/55   Pulse: 94   Resp: 16   Temp: 36.9 °C (98.5 °F)   SpO2: 96%     Level of consciousness: Awake, Alert, Oriented  Post-anesthesia pain: Adequate analgesia  Anesthetic complications: None  Nausea and Vomiting Controled: Yes  Hydration: Adequate  Cardiovascular Function: Stable  Neuro Exam: WNL  Respiration: WNL  Pain is well controlled after spinal preservative free morphine administration and additional IV/PO meds:  Continue 24 hours observation after preservative free morphine administration:

## 2021-02-27 NOTE — PLAN OF CARE
Problem: Adult Inpatient Plan of Care  Goal: Plan of Care Review  Outcome: Progressing  Flowsheets  Taken 2021  Progress: improving  Outcome Summary: Patient transferred to postpartum from L&D. Patient tolerating oral intake, spain catheter with adequate urinary output, on bed rest at this time, and bonding well with .  Taken 2021 1950  Plan of Care Reviewed With:   patient   spouse  Goal: Patient-Specific Goal (Individualization)  Outcome: Progressing  Flowsheets (Taken 2021)  Patient-Specific Goals (Include Timeframe): Patient will tolerate oral intake, tolerate getting OOB with assistance, have adequate urinary output, and begin breastfeeding .  Individualized Care Needs: Will assist patient with breastfeeding as needed  Anxieties, Fears or Concerns: concerns about breastfeeding   Plan of Care Review  Plan of Care Reviewed With: patient, spouse  Progress: improving  Outcome Summary: Patient transferred to postpartum from L&D. Patient tolerating oral intake, spain catheter with adequate urinary output, on bed rest at this time, and bonding well with .

## 2021-02-27 NOTE — PLAN OF CARE
Problem: Adult Inpatient Plan of Care  Goal: Plan of Care Review  Outcome: Progressing  Goal: Patient-Specific Goal (Individualization)  Outcome: Progressing  Goal: Absence of Hospital-Acquired Illness or Injury  Outcome: Progressing  Goal: Optimal Comfort and Wellbeing  Outcome: Progressing  Goal: Readiness for Transition of Care  Outcome: Progressing  Goal: Rounds/Family Conference  Outcome: Progressing     Problem: Adjustment to Role Transition (Postpartum  Delivery)  Goal: Successful Maternal Role Transition  Outcome: Progressing     Problem: Bleeding (Postpartum  Delivery)  Goal: Hemostasis  Outcome: Progressing     Problem: Infection (Postpartum  Delivery)  Goal: Absence of Infection Signs/Symptoms  Outcome: Progressing     Problem: Pain (Postpartum  Delivery)  Goal: Acceptable Pain Control  Outcome: Progressing     Problem: Postoperative Nausea and Vomiting (Postpartum  Delivery)  Goal: Nausea and Vomiting Relief  Outcome: Progressing     Problem: Postoperative Urinary Retention (Postpartum  Delivery)  Goal: Effective Urinary Elimination  Outcome: Progressing

## 2021-02-27 NOTE — LACTATION NOTE
LC to bedside for lactation support.  Mom states infant is bf fair, and just finished breastfeeding for 10 minutes.  Some latch difficulties on R breast.  Mom welcomed lactation support.    Reviewed basic breastfeeding education and techniques to achieve a deep latch.  Infant latched deeply on R breast in football hold.  Lower lip flanged for deeper latch and to improve mild latch discomfort.  Infant actively breast fed for 10 minutes with LC present.    [place] Feeding Patterns Reviewed.  Encouraged to feed infant more frequently on demand/q2-3h.  Aware to offer both breast at each feed.      New Beginnings Booklet Reviewed.  Outpatient Resources Provided.      Has pump for home.    No further questions.    Roselia Lainez, IBCLC

## 2021-02-27 NOTE — PROGRESS NOTES
Post-Op Check    Events  Pt seen/examined. No events since surgery.    Subjective  Pt denies: HA, CP, SOB, N/V and F/C.No complaints.  Pain: controlled  Vaginal Bleeding: moderate  Voiding: Mckeon catheter in place draining yellow urine   Diet: taking regular diet  Bowel: no flatus    Vitals  Temp:  [36.6 °C (97.8 °F)-37.3 °C (99.2 °F)] 37.2 °C (98.9 °F)  Heart Rate:  [] 99  Resp:  [16-20] 18  BP: ()/(45-78) 112/59    I&O    Intake/Output Summary (Last 24 hours) at 2021  Last data filed at 2021  Gross per 24 hour   Intake --   Output 1273 ml   Net -1273 ml       Urine output since delivery: 100cc/hr    Physical Exam  General: A&Ox3 and NAD   Heart: Regular rate and rhythm  Lungs: Clear to auscultation bilaterally  Abdomen: soft, nondistended, non-tender, no rebound/rigidity/guarding. Fundus firm U-2  Incision: dressing clean, dry, intact  Extremities: symmetric and no edema. SCDs on and functioning      Assessment/Plan   Problem-based Assessment and Plan    Rosamaria Dunne is a 30 y.o. POD#0 s/p 1'LTCS 2/2 NRFHT c/b PPH 2/2 uterine atony    1. Vital Signs: stable  2. Hemodynamics: stable. Pre-op hgb 13.3, s/p B-dobson suture. CBC ordered for the AM. No si/sx acute anemia.   3. Pain: controlled. Continue ERAS protocol.  4. VTE Assessment: SCDs Encouraged ambulation at least 4 times daily.  5. Post-op care: UOP adequate. For TOV in AM.     Anthony Choe MD

## 2021-02-27 NOTE — PROGRESS NOTES
21 1201: SW met with patient and FOB at . Patient and FOB were bonding appropriately with baby. . Patient stated she works full time for [employer] as a . Patient stated she has been on maternity leave for the 3 weeks. FOB/ is Luis Sherman who also works full time and will be taking paternity leave. Patient expressed having all neccessary baby equipment including a crib and carseat. Patient stated they have very supportive family members.     Patient expressed having been diagnosed with anxiety and depression 8-10 years ago. Patient stated at that time she has a therapist and psychiatrist. Patient stated she is currently not on any medications and do not meet with a therapist. Patient stated her mood has been stable and she has a great deal of family support.     Patient will be using [facility] for pediatric care. SW will continue to follow if needed. Magdalene Lopez,LYNN [phone]

## 2021-02-28 PROCEDURE — 12000000 HC ROOM AND CARE MED/SURG

## 2021-02-28 PROCEDURE — 63600000 HC DRUGS/DETAIL CODE: Performed by: STUDENT IN AN ORGANIZED HEALTH CARE EDUCATION/TRAINING PROGRAM

## 2021-02-28 PROCEDURE — 63700000 HC SELF-ADMINISTRABLE DRUG: Performed by: STUDENT IN AN ORGANIZED HEALTH CARE EDUCATION/TRAINING PROGRAM

## 2021-02-28 RX ORDER — IBUPROFEN 600 MG/1
600 TABLET ORAL
Status: DISCONTINUED | OUTPATIENT
Start: 2021-02-28 | End: 2021-03-01 | Stop reason: HOSPADM

## 2021-02-28 RX ADMIN — ACETAMINOPHEN 975 MG: 325 TABLET, FILM COATED ORAL at 04:25

## 2021-02-28 RX ADMIN — ACETAMINOPHEN 975 MG: 325 TABLET, FILM COATED ORAL at 23:24

## 2021-02-28 RX ADMIN — ACETAMINOPHEN 975 MG: 325 TABLET, FILM COATED ORAL at 11:14

## 2021-02-28 RX ADMIN — KETOROLAC TROMETHAMINE 30 MG: 30 INJECTION, SOLUTION INTRAMUSCULAR; INTRAVENOUS at 06:55

## 2021-02-28 RX ADMIN — KETOROLAC TROMETHAMINE 30 MG: 30 INJECTION, SOLUTION INTRAMUSCULAR; INTRAVENOUS at 13:46

## 2021-02-28 RX ADMIN — IBUPROFEN 600 MG: 600 TABLET, FILM COATED ORAL at 20:33

## 2021-02-28 RX ADMIN — KETOROLAC TROMETHAMINE 30 MG: 30 INJECTION, SOLUTION INTRAMUSCULAR; INTRAVENOUS at 01:05

## 2021-02-28 RX ADMIN — DOCUSATE SODIUM AND SENNOSIDES 1 TABLET: 8.6; 5 TABLET, FILM COATED ORAL at 19:50

## 2021-02-28 RX ADMIN — PRENATAL VITAMINS-IRON FUMARATE 27 MG IRON-FOLIC ACID 0.8 MG TABLET 1 TABLET: at 08:46

## 2021-02-28 RX ADMIN — DOCUSATE SODIUM AND SENNOSIDES 1 TABLET: 8.6; 5 TABLET, FILM COATED ORAL at 08:46

## 2021-02-28 RX ADMIN — ACETAMINOPHEN 975 MG: 325 TABLET, FILM COATED ORAL at 16:11

## 2021-02-28 NOTE — PLAN OF CARE
Problem: Adult Inpatient Plan of Care  Goal: Plan of Care Review  Outcome: Progressing  Flowsheets  Taken 2021 0042  Outcome Summary: Patient ambulating well, making progress with breastfeeding, bonding well with , caring for infant independently, and reporting adequate pain relief.  Taken 2021 2300  Plan of Care Reviewed With:   patient   spouse  Taken 2021 2144  Progress: improving   Plan of Care Review  Plan of Care Reviewed With: patient, spouse  Progress: improving  Outcome Summary: Patient ambulating well, making progress with breastfeeding, bonding well with , caring for infant independently, and reporting adequate pain relief.

## 2021-02-28 NOTE — LACTATION NOTE
LC to bedside for lactation support.  Mom states infant bf has improved since last consult.  Infant currently latched on R breast, actively feeding and no pain reported.    Reviewed recommended breastfeeding schedule and pumping questions/concerns reviewed.  Infant with posterior TT diagnosed by ped, education provided to parents.     Encouraged to continue to monitor infant I/O and follow up with outpatient LC.  New Beginnings Booklet Resources reviewed.  Has pump for home.    No further questions.    Roselia Lainez, THERESACLC

## 2021-02-28 NOTE — PROGRESS NOTES
Obstetrics  Postpartum Progress Note    Events  Patient seen and examined. No acute events overnight..    Subjective  Denies HA, CP, SOB, N/V and F/C. Pain well controlled with ERAS protocol. No complaints.    Bleeding: lochia minimal  Diet: taking regular diet  Voiding: without difficulty  Bowel: passing flatus  Ambulating: as tolerated    Vitals  Temp:  [36.7 °C (98.1 °F)-36.9 °C (98.5 °F)] 36.7 °C (98.1 °F)  Heart Rate:  [] 90  Resp:  [16-18] 17  BP: (88-98)/(52-55) 96/54    I&O    Intake/Output Summary (Last 24 hours) at 2021 0542  Last data filed at 2021 1200  Gross per 24 hour   Intake 80 ml   Output 1200 ml   Net -1120 ml       Physical Exam  General: well  Heart: Regular rate and rhythm  Lungs: Clear to auscultation bilaterally  Abdomen: soft, nondistended, non-tender, positive bowel sounds, no rebound/rigidity/guarding. Fundus firm and below umbilicus  Incision: healing well, no significant drainage, no dehiscence and no significant erythema  Extremities: symmetric and no edema    Labs  Labs Reviewed:  Lab Results   Component Value Date    ABO O 2021    LABRH Positive 2021      CBC Results       21                    0259 0315 0808         WBC 13.68 10.47 9.9         RBC 3.03 4.21 3.51         HGB 9.6 13.3 11.4         HCT 28.9 39.2 33.6         MCV 95.4 93.1 96         MCH 31.7 31.6 32.5         MCHC 33.2 33.9 33.9          164 157                     Rubella: immune    Assessment/Plan   Problem-based Assessment and Plan    Rosamaria Dunne is a 30 y.o.  POD#2 s/p 1'LTCS 2/2 NRFHT with B-dobson suture c/b post partum hemorrhage     1. Vital Signs: stable  2. Hemodynamics: stable.EBL 1L. Post partum hemorrhage. W/p 10U pitocin, Methergine, Hemabate, Cytotec 1000 IL. Hgb 13.3 pre-op--->9.6. No si/sx acute anemia.   3. Pain: controlled with ERAS protocol   4. VTE Assessment: Continue SCDs Encouraged ambulation at least 4 times  daily.  5. Vaccinations/Rhogam: Rhogam not indicated  6. Post-op care: meeting appropriate post-op milestones. Continue routine post-op care.     Frankie Kennedy DO

## 2021-03-01 VITALS
HEART RATE: 91 BPM | HEIGHT: 62 IN | WEIGHT: 148.6 LBS | RESPIRATION RATE: 18 BRPM | BODY MASS INDEX: 27.34 KG/M2 | TEMPERATURE: 97.9 F | OXYGEN SATURATION: 97 % | SYSTOLIC BLOOD PRESSURE: 103 MMHG | DIASTOLIC BLOOD PRESSURE: 68 MMHG

## 2021-03-01 PROCEDURE — 63700000 HC SELF-ADMINISTRABLE DRUG: Performed by: STUDENT IN AN ORGANIZED HEALTH CARE EDUCATION/TRAINING PROGRAM

## 2021-03-01 RX ORDER — IBUPROFEN 600 MG/1
600 TABLET ORAL
Qty: 40 TABLET | Refills: 0 | Status: SHIPPED | OUTPATIENT
Start: 2021-03-01 | End: 2021-04-09

## 2021-03-01 RX ADMIN — DOCUSATE SODIUM AND SENNOSIDES 1 TABLET: 8.6; 5 TABLET, FILM COATED ORAL at 08:45

## 2021-03-01 RX ADMIN — ACETAMINOPHEN 975 MG: 325 TABLET, FILM COATED ORAL at 11:39

## 2021-03-01 RX ADMIN — ACETAMINOPHEN 975 MG: 325 TABLET, FILM COATED ORAL at 05:41

## 2021-03-01 RX ADMIN — IBUPROFEN 600 MG: 600 TABLET, FILM COATED ORAL at 08:45

## 2021-03-01 RX ADMIN — PRENATAL VITAMINS-IRON FUMARATE 27 MG IRON-FOLIC ACID 0.8 MG TABLET 1 TABLET: at 08:45

## 2021-03-01 RX ADMIN — IBUPROFEN 600 MG: 600 TABLET, FILM COATED ORAL at 02:33

## 2021-03-01 NOTE — PROGRESS NOTES
Obstetrics  Postpartum Progress Note    Events  Patient seen and examined. No acute events overnight..    Subjective  Denies HA, CP, SOB, N/V and F/C. Pain well-controlled with tylenol/motrin. No complaints.    Bleeding: lochia minimal  Diet: taking regular diet  Voiding: without difficulty  Bowel: passing flatus no bowel movement  Ambulating: as tolerated    Vitals  Temp:  [36.2 °C (97.2 °F)-36.7 °C (98 °F)] 36.2 °C (97.2 °F)  Heart Rate:  [84-88] 85  Resp:  [14-16] 16  BP: ()/(53-57) 100/56    Physical Exam  General: well  Heart: Regular rate and rhythm  Lungs: Clear to auscultation bilaterally  Abdomen: soft, nondistended, non-tender, no rebound/rigidity/guarding. Fundus firm below umbilicus   Incision: healing well, no significant drainage, no dehiscence and no significant erythema  Extremities: symmetric and no edema    Labs  Labs Reviewed:  No results found for: ABO, LABRH   CBC Results       21                    0259 0315 0808         WBC 13.68 10.47 9.9         RBC 3.03 4.21 3.51         HGB 9.6 13.3 11.4         HCT 28.9 39.2 33.6         MCV 95.4 93.1 96         MCH 31.7 31.6 32.5         MCHC 33.2 33.9 33.9          164 157                     Rubella: immune    Assessment/Plan   Problem-based Assessment and Plan    Rosamaria Dunne is a 30 y.o.  POD#3 s/p 1'LTCS 2/2 NRFHT w/ B Slaughter, PPH    1. Vital Signs: stable  2. Hemodynamics/PPH: stable. Hgb 13.3 pre-op--->9.6. No si/sx acute anemia. EBL 1L. S/p 10u Pitocin, methergine, hemabate x2, cytotec 1000mcg DC.    3. Pain: controlled  4. VTE Assessment: Continue SCDs Encouraged ambulation at least 4 times daily.  5. Vaccinations/Rhogam: Rhogam not indicated  6. Post-op care: meeting appropriate post-op milestones. Continue routine post-op care.    7. Anxiety: Mood stable. Patient not on a medication regimen at home.     Aurelia Lucero MD

## 2021-03-01 NOTE — DISCHARGE SUMMARY
Obstetrical Discharge Form          Date of Delivery: 2021 at 4:25 PM     Gestational Age:39w4d    Delivered By: Roxann Marquis     Delivery Type: primary  section, low transverse incision    Antepartum complications: none    Baby: Liveborn male , Apgars 8  /9  , 3.88 kg (8 lb 8.9 oz)     Anesthesia: Epidural     Intrapartum complications: NRFHT    Laceration:      Discharge Date: 3/1/21      Plan:    Follow-up appointment with your doctors in 2-3 weeks, please call for an appointment

## 2021-03-01 NOTE — LACTATION NOTE
Mom reports infant bf well, but has a 10% weight loss so ped put them on 15-15-15 plan. Reviewed specifics of plan with mom  Mom feels her milk coming in and is planning to decrease and eventually discontinue formula supplementation when allowed by ped.     Reviewed bf chapter in mother baby book. Encouraged f/u with out pt LC resources for d/c. Aware to feed at least q3 & monitor output. Questions answered and support given. Has pump for home.   Mom to call for LC assist at next feed.

## 2021-03-01 NOTE — PLAN OF CARE
Plan of Care Review  Plan of Care Reviewed With: patient, spouse  Progress: improving  Outcome Summary: Mom is recovering well: VSS, incision healing well, indendent in self care, bonding with baby. Pt should be ready for discharge home today.    Problem: Breastfeeding  Goal: Effective Breastfeeding  Outcome: Progressing     Mom is breastfeeding well. She is also supplementing and pumping due to baby's 10% weight loss.

## 2021-03-02 LAB
CASE RPRT: NORMAL
CLINICAL INFO: NORMAL
PATH REPORT.FINAL DX SPEC: NORMAL
PATH REPORT.GROSS SPEC: NORMAL

## 2021-03-04 ENCOUNTER — TELEPHONE (OUTPATIENT)
Dept: OBSTETRICS AND GYNECOLOGY | Facility: CLINIC | Age: 31
End: 2021-03-04

## 2021-03-04 NOTE — TELEPHONE ENCOUNTER
"Diana called with breast pain and low grade fever  Delivered by primary c/s on Friday 2/26  Pumping and hand expressing initially to help with infant weight gain  Milk came in Tuesday (3/2)  Became very engorged very quickly. Baby nursing well, gaining weight.   Tues started to feel achey but improved after nursing and rest  Wed: more achey, hand expression to relieve pressure. Breasts feel much romero, heavier  Thursday- very achey, fever to 100.8 just now. Both breasts tender and heavy. A few small spots more painful bilaterally. No erythema.   Otherwise feeling fine. Incision looks good. No pelvic pain. Lochia light. No respiratory symptoms. + edema but no calf tenderness. No dysuira    Unclear diagnosis. Suspect low grade fever due to engorgement. At postpartum day 6 it is early to get mastitis. Bilateral nature of symptoms less c/w mastitis. No clear signs of other infection as she denies erythema at incision or pelvic pain. No other symptoms to suggest UTI, URI, VTE.     Plan tylenol and hydration. Try pumping after feeds to completely empty breast. Pump with pumping bra and try \"hands-on\" pumping to further express milk. If continues to have fever or worsening pain tomorrow we can start abx for presumed mastitis.   "

## 2021-03-11 ENCOUNTER — OFFICE VISIT (OUTPATIENT)
Dept: OBSTETRICS AND GYNECOLOGY | Facility: CLINIC | Age: 31
End: 2021-03-11
Payer: COMMERCIAL

## 2021-03-11 VITALS — WEIGHT: 138 LBS | DIASTOLIC BLOOD PRESSURE: 62 MMHG | SYSTOLIC BLOOD PRESSURE: 104 MMHG | BODY MASS INDEX: 25.24 KG/M2

## 2021-03-11 PROCEDURE — 0503F POSTPARTUM CARE VISIT: CPT | Performed by: OBSTETRICS & GYNECOLOGY

## 2021-03-11 RX ORDER — DOCUSATE SODIUM 100 MG/1
100 CAPSULE, LIQUID FILLED ORAL 2 TIMES DAILY
COMMUNITY
End: 2022-08-25 | Stop reason: ALTCHOICE

## 2021-03-11 NOTE — PROGRESS NOTES
Patient ID: Rosamaria Dunne   : 1990  MRN: [id number]   Visit Date: 3/11/2021    Subjective   Rosamaria Dunne is presenting today for Wound Check      S/P Primary CS due to PROM, NRFHT on   Complicated by uterine atony and postpartum hemorrhage - received Methergine, Hemabate x 2 and had a B dobson stitch placed.  Received 1000 mcg of rectal cytotec   Montrell Anne   Hgb 9.6 postpartum day 1    Pregnancy uncomplicated    Feeling overall good  Had a fever last week on Thursday up to 100.8 - resolved after pumping.  Thought to be from engorgement.   Occasionally feels chills since that time, has no longer had a fever  Mild pain- tolerable  Still has vaginal bleeding, small clots  No bowel/ bladder issues    Pap 2020 neg cytology + HPV (non 16/18)  Vital Signs for this encounter:   Visit Vitals  /62   Wt 62.6 kg (138 lb)   LMP 2020   BMI 25.24 kg/m²       Obstetric History:   OB History    Para Term  AB Living   2 1 1 0 1 1   SAB TAB Ectopic Multiple Live Births   0 0 0 0 1      # Outcome Date GA Lbr Andrea/2nd Weight Sex Delivery Anes PTL Lv   2 Term 21 39w4d  3880 g (8 lb 8.9 oz) M CS-LTranv EPI N REINALDO      Complications: Fetal Intolerance of labor   1 AB              Past Medical History:  has a past medical history of Anxiety.  Past Surgical History:  has a past surgical history that includes tonsillectomy & adenoidectomy ().  Family History: family history includes Liver cancer in her biological father and father's brother; Ovarian cancer in her father's sister.  Social History:  reports that she has never smoked. She has never used smokeless tobacco. She reports previous alcohol use. She reports that she does not use drugs.  Medications:   Current Outpatient Medications:   •  docusate sodium (STOOL SOFTENER) 100 mg capsule, Take 100 mg by mouth 2 (two) times a day., Disp: , Rfl:   •  ibuprofen (MOTRIN) 600 mg tablet, Take 1 tablet (600 mg total) by mouth every  6 (six) hours for 10 days., Disp: 40 tablet, Rfl: 0  •  prenatal vit-iron fum-folic ac 27 mg iron- 0.8 mg tablet tablet, Take 1 tablet by mouth daily., Disp: , Rfl:     Allergies: has No Known Allergies.     HPI  Review of Systems  Physical Exam  Constitutional:       Appearance: Normal appearance.   Pulmonary:      Effort: Pulmonary effort is normal.   Abdominal:      General: Abdomen is flat.      Palpations: Abdomen is soft.      Tenderness: There is no abdominal tenderness. There is no guarding or rebound.      Comments: Incision c/d/i  FF below umbilicus without significant tenderness   Neurological:      Mental Status: She is alert.          Diagnoses and all orders for this visit:    Postpartum care following  delivery (Primary)    Meeting appropriate postoperative milestones  No further fevers  Return for exam in 4 weeks       Jesusita Ni MD

## 2021-04-08 PROBLEM — O44.40 LOW-LYING PLACENTA: Status: RESOLVED | Noted: 2020-10-14 | Resolved: 2021-04-08

## 2021-04-08 PROBLEM — Z34.90 PREGNANCY: Status: RESOLVED | Noted: 2020-09-15 | Resolved: 2021-04-08

## 2021-04-08 PROBLEM — Z98.891 HISTORY OF CESAREAN DELIVERY: Status: ACTIVE | Noted: 2021-04-08

## 2021-04-08 PROBLEM — O42.90 PROM (PREMATURE RUPTURE OF MEMBRANES): Status: RESOLVED | Noted: 2021-02-26 | Resolved: 2021-04-08

## 2021-04-08 NOTE — PROGRESS NOTES
2021  Rosamaria Cornejo is a 30 y.o.    female who presents for 6 week postpartum exam. Had 1LTCS 21 by BE for NRFHT. Complicated by uterine atony, PPH requiring multiple uterotonics and B-dobson. Postop course complicated by fever on POD#6, suspected engorgement.     Lochia: resolved  Menses: not yet  Breast/bottle: breastfeeding  Resumed intercourse: not yet  Desired contraception: unsure -> POP  PPD: no          GYN screening history:   Last pap: 20 cytology NIL, +HPV, neg 16/18  Last mammo: never    Gyn History:    No LMP recorded.    History of abnormal Pap smear: no  History of abnormal mammogram: no   History of cysts, fibroids, STIs, etc:  no      OB History:   OB History    Para Term  AB Living   2 1 1 0 1 1   SAB TAB Ectopic Multiple Live Births   0 0 0 0 1      # Outcome Date GA Lbr Andrea/2nd Weight Sex Delivery Anes PTL Lv   2 Term 21 39w4d  3880 g (8 lb 8.9 oz) M CS-LTranv EPI N REINALDO      Complications: Fetal Intolerance of labor      Name: LUCIA CORNEJO      Apgar1: 8  Apgar5: 9   1 AB                Allergies: Patient has no known allergies.    Prior to Admission medications    Medication Sig Start Date End Date Taking? Authorizing Provider   docusate sodium (STOOL SOFTENER) 100 mg capsule Take 100 mg by mouth 2 (two) times a day.    Provider, MD Cong   ibuprofen (MOTRIN) 600 mg tablet Take 1 tablet (600 mg total) by mouth every 6 (six) hours for 10 days. 3/1/21 3/11/21  Magdiel Campos MD   prenatal vit-iron fum-folic ac 27 mg iron- 0.8 mg tablet tablet Take 1 tablet by mouth daily.    Provider, MD Cong       Medical History:   Past Medical History:   Diagnosis Date   • Anxiety     used to see a therapist, previously on lexapro       Surgical History:   Past Surgical History:   Procedure Laterality Date   •  SECTION  2021   • TONSILLECTOMY & ADENOIDECTOMY         Social History:   Social History  "    Socioeconomic History   • Marital status: Single     Spouse name: None   • Number of children: None   • Years of education: None   • Highest education level: None   Occupational History   • None   Social Needs   • Financial resource strain: None   • Food insecurity     Worry: None     Inability: None   • Transportation needs     Medical: None     Non-medical: None   Tobacco Use   • Smoking status: Never Smoker   • Smokeless tobacco: Never Used   Substance and Sexual Activity   • Alcohol use: Not Currently   • Drug use: Never   • Sexual activity: None   Lifestyle   • Physical activity     Days per week: None     Minutes per session: None   • Stress: None   Relationships   • Social connections     Talks on phone: None     Gets together: None     Attends Alevism service: None     Active member of club or organization: None     Attends meetings of clubs or organizations: None     Relationship status: None   • Intimate partner violence     Fear of current or ex partner: None     Emotionally abused: None     Physically abused: None     Forced sexual activity: None   Other Topics Concern   • None   Social History Narrative   • None        Family History:   Family History   Problem Relation Age of Onset   • Liver cancer Biological Father    • Ovarian cancer Father's Sister    • Liver cancer Father's Brother    • Breast cancer Neg Hx    • Colon cancer Neg Hx        Review of Systems and Physical Exam  The following have been reviewed and updated as appropriate in this visit: Allergies  Meds  Problems       Visit Vitals  /64   Ht 1.575 m (5' 2\")   Wt 60.8 kg (134 lb)   Breastfeeding Yes   BMI 24.51 kg/m²     HPI  Review of Systems  Physical Exam  Constitutional:       Appearance: Normal appearance. She is well-developed.   Genitourinary:      Pelvic exam was performed with patient in the lithotomy position.      Urethra, vagina, cervix and uterus normal.   Pelvic exam was performed with patient in lithotomy " exam position.     External female genitalia normal.   Urethral meatus normal.   Urethra normal.   Normal bladder.   Vagina normal.   Cervix exam normal.  Uterus is normal.   Adnexa normal. HENT:      Head: Normocephalic and atraumatic.   Eyes:      Pupils: Pupils are equal, round, and reactive to light.   Neck:      Musculoskeletal: Normal range of motion.   Pulmonary:      Effort: Pulmonary effort is normal.   Chest:      Breasts: Breasts are symmetrical.         Right: Normal. No swelling, bleeding, inverted nipple, mass, nipple discharge, skin change or tenderness.         Left: Normal. No swelling, bleeding, inverted nipple, mass, nipple discharge, skin change or tenderness.   Abdominal:      General: There is no distension.      Palpations: Abdomen is soft. There is no mass.      Tenderness: There is no abdominal tenderness. There is no guarding or rebound.      Hernia: No hernia is present.      Comments: Inc c/d/i   Musculoskeletal:      Right lower leg: No edema.      Left lower leg: No edema.   Lymphadenopathy:      Upper Body:      Right upper body: No axillary adenopathy.      Left upper body: No axillary adenopathy.   Neurological:      General: No focal deficit present.      Mental Status: She is alert and oriented to person, place, and time.   Skin:     General: Skin is warm and dry.   Psychiatric:         Mood and Affect: Mood normal.         Behavior: Behavior normal.   Vitals signs reviewed.           Assessment/Plan:  Diagnoses and all orders for this visit:    Encounter for postpartum visit  - Exam wnl  - Pap: collected  - May resume all activities.  - Counseled pt on optimal interpregnancy interval and risks of short-interval pregnancy.  - Contraception: options reviewed. Pt desires POP. Discussed risks, benefits, use. Rx sent: norethindrone (MICRONOR) 0.35 mg tablet; Take 1 tablet (0.35 mg total) by mouth daily.    HPV (human papilloma virus) infection  -     PAP AND HR HPV W/REFLEX TO  GENOTYPING 16,18/45    Return in about 1 year (around 4/9/2022) for Annual visit.  Roxann Marquis, DO

## 2021-04-09 ENCOUNTER — OFFICE VISIT (OUTPATIENT)
Dept: OBSTETRICS AND GYNECOLOGY | Facility: CLINIC | Age: 31
End: 2021-04-09
Payer: COMMERCIAL

## 2021-04-09 VITALS
SYSTOLIC BLOOD PRESSURE: 116 MMHG | BODY MASS INDEX: 24.66 KG/M2 | WEIGHT: 134 LBS | DIASTOLIC BLOOD PRESSURE: 64 MMHG | HEIGHT: 62 IN

## 2021-04-09 DIAGNOSIS — B97.7 HPV (HUMAN PAPILLOMA VIRUS) INFECTION: ICD-10-CM

## 2021-04-09 PROCEDURE — 0503F POSTPARTUM CARE VISIT: CPT | Performed by: OBSTETRICS & GYNECOLOGY

## 2021-04-09 RX ORDER — NORETHINDRONE 0.35 MG/1
1 TABLET ORAL DAILY
Qty: 84 TABLET | Refills: 3 | Status: SHIPPED | OUTPATIENT
Start: 2021-04-09 | End: 2022-03-10

## 2021-04-14 LAB
CYTOLOGIST CVX/VAG CYTO: ABNORMAL
CYTOLOGY CVX/VAG DOC CYTO: ABNORMAL
CYTOLOGY CVX/VAG DOC THIN PREP: ABNORMAL
DX ICD CODE: ABNORMAL
HPV I/H RISK 4 DNA CVX QL PROBE+SIG AMP: POSITIVE
HPV16 DNA CVX QL PROBE+SIG AMP: NEGATIVE
HPV18+45 E6+E7 MRNA CVX QL NAA+PROBE: NEGATIVE
LAB CORP NOTE:: ABNORMAL
OTHER STN SPEC: ABNORMAL
STAT OF ADQ CVX/VAG CYTO-IMP: ABNORMAL

## 2021-04-15 ENCOUNTER — TELEPHONE (OUTPATIENT)
Dept: OBSTETRICS AND GYNECOLOGY | Facility: CLINIC | Age: 31
End: 2021-04-15

## 2021-04-16 ENCOUNTER — TELEPHONE (OUTPATIENT)
Dept: OBSTETRICS AND GYNECOLOGY | Facility: CLINIC | Age: 31
End: 2021-04-16

## 2021-04-20 NOTE — PROGRESS NOTES
Visit Date: 2021   Rosamaria Dunne is 30 y.o. P1 female presenting today for f/u abnl Pap.    Saw Dr. Marquis for 6 week pp visit 21   Had 1LTCS 21 by BE for NRFHT. Complicated by uterine atony, PPH requiring multiple uterotonics and B-dobson. Postop course complicated by fever on POD#6, suspected engorgement.    Pap 2020 WNL + HR HPV (16,18 neg)  Pap 2021 WNL + HR HPV (16,18 neg)    No h/o previously abnl Paps  No Gardasil  Smoking status: Non-smoker.     The following have been reviewed and updated as appropriate in this visit:      Visit Vitals  BP 92/60   Wt 60 kg (132 lb 3.2 oz)   LMP  (LMP Unknown)   BMI 24.18 kg/m²     Menstrual History:  OB History        2    Para   1    Term   1       0    AB   1    Living   1       SAB   0    TAB   0    Ectopic   0    Multiple   0    Live Births   1                No LMP recorded (lmp unknown).       Medications:   Current Outpatient Medications:   •  docusate sodium (STOOL SOFTENER) 100 mg capsule, Take 100 mg by mouth 2 (two) times a day., Disp: , Rfl:   •  norethindrone (MICRONOR) 0.35 mg tablet, Take 1 tablet (0.35 mg total) by mouth daily., Disp: 84 tablet, Rfl: 3  •  prenatal vit-iron fum-folic ac 27 mg iron- 0.8 mg tablet tablet, Take 1 tablet by mouth daily., Disp: , Rfl:     Allergies: has No Known Allergies.     Past Medical History:  has a past medical history of Anxiety.  Past Surgical History:  has a past surgical history that includes tonsillectomy & adenoidectomy () and  section (2021).  Family History: family history includes Liver cancer in her biological father and father's brother; Ovarian cancer in her father's sister.  Social History:  reports that she has never smoked. She has never used smokeless tobacco. She reports previous alcohol use. She reports that she does not use drugs.  The patient is sexually active.    Physical Exam  Visit Vitals  BP 92/60   Wt 60 kg (132 lb 3.2 oz)   LMP  (LMP  Unknown)   BMI 24.18 kg/m²          General Appearance: Alert, cooperative, no acute distress  Head: Normocephalic, without obvious abnormality, atraumatic    Pelvic:  See colpo note    Assessment & Plan  Abnl Pap + HPV (16, 18 neg) x 2    -no visible lesions  -discussed Gardisil vacc series. Info brochure given. Considering.  -recc call to start when done breast feeding if desired  -recc f/u Pap and HPV co-testing 12 mos    No follow-ups on file.  Ezra Avelar MD

## 2021-04-21 ENCOUNTER — OFFICE VISIT (OUTPATIENT)
Dept: OBSTETRICS AND GYNECOLOGY | Facility: CLINIC | Age: 31
End: 2021-04-21
Payer: COMMERCIAL

## 2021-04-21 VITALS — BODY MASS INDEX: 24.18 KG/M2 | DIASTOLIC BLOOD PRESSURE: 60 MMHG | SYSTOLIC BLOOD PRESSURE: 92 MMHG | WEIGHT: 132.2 LBS

## 2021-04-21 DIAGNOSIS — R87.629 ABNORMAL VAGINAL PAP SMEAR: Primary | ICD-10-CM

## 2021-04-21 PROCEDURE — 99999 PR OFFICE/OUTPT VISIT,PROCEDURE ONLY: CPT | Performed by: OBSTETRICS & GYNECOLOGY

## 2021-04-21 PROCEDURE — 57452 EXAM OF CERVIX W/SCOPE: CPT | Performed by: OBSTETRICS & GYNECOLOGY

## 2021-04-21 NOTE — PROCEDURES
Colposcopy    Date/Time: 4/21/2021 11:31 AM  Performed by: Ezra Avelar MD  Authorized by: Ezra Avelar MD     Consent:     Consent obtained:  Verbal and written    Consent given by:  Patient    Patient questions answered: yes      Patient agrees, verbalizes understanding, and wants to proceed: yes    Pre-procedure:     Prepped with: acetic acid    Indication:     Indications: WNL +HR PV (16,18 neg) x 2.  Procedure:     Procedure: Colposcopy w/ endocervical curettage      Under satisfactory analgesia the patient was prepped and draped in the dorsal lithotomy position: yes      [place] speculum was placed in the vagina: yes      Under colposcopic examination the transition zone was seen in entirety: no      Endocervix was curetted using a Kevorkian curette: yes      Specimen to pathology: yes    Post-procedure:     Impression comment:  HPV infection     Comments:      Call w ECC results  Discussed Gardasil- see note  Recc f/u co-testing in 12 mos

## 2021-05-03 LAB
DX ICD CODE: NORMAL
PATH REPORT.FINAL DX SPEC: NORMAL
PATH REPORT.GROSS SPEC: NORMAL
PATH REPORT.SITE OF ORIGIN SPEC: NORMAL
PATHOLOGIST NAME: NORMAL
PAYMENT PROCEDURE: NORMAL

## 2021-09-13 ENCOUNTER — HOSPITAL ENCOUNTER (OUTPATIENT)
Facility: CLINIC | Age: 31
Discharge: HOME | End: 2021-09-13
Attending: INTERNAL MEDICINE
Payer: COMMERCIAL

## 2021-09-13 VITALS
HEART RATE: 82 BPM | OXYGEN SATURATION: 99 % | SYSTOLIC BLOOD PRESSURE: 110 MMHG | DIASTOLIC BLOOD PRESSURE: 76 MMHG | TEMPERATURE: 98.4 F

## 2021-09-13 DIAGNOSIS — J01.10 ACUTE NON-RECURRENT FRONTAL SINUSITIS: Primary | ICD-10-CM

## 2021-09-13 PROCEDURE — S9083 URGENT CARE CENTER GLOBAL: HCPCS | Performed by: NURSE PRACTITIONER

## 2021-09-13 PROCEDURE — 99202 OFFICE O/P NEW SF 15 MIN: CPT | Performed by: NURSE PRACTITIONER

## 2021-09-13 RX ORDER — AMOXICILLIN AND CLAVULANATE POTASSIUM 875; 125 MG/1; MG/1
1 TABLET, FILM COATED ORAL
Qty: 14 TABLET | Refills: 0 | Status: SHIPPED | OUTPATIENT
Start: 2021-09-13 | End: 2021-09-20

## 2021-09-13 ASSESSMENT — ENCOUNTER SYMPTOMS
COUGH: 0
SINUS PAIN: 1
SINUS PRESSURE: 1
FATIGUE: 0
CHILLS: 0
FEVER: 0
MYALGIAS: 0
SHORTNESS OF BREATH: 0
HEADACHES: 1

## 2021-09-13 NOTE — ED PROVIDER NOTES
Emergency Medicine Note  HPI   HISTORY OF PRESENT ILLNESS     30-year-old female who is currently breast-feeding presents today with a 2-week history of sinus pain, pressure, headache and yellow/green mucous production. Denies fevers, CP or SOB. Recent covid test was negative            Patient History   PAST HISTORY     Reviewed from Nursing Triage:      No past medical history on file.    No past surgical history on file.    No family history on file.    Social History     Tobacco Use   • Smoking status: Not on file   Substance Use Topics   • Alcohol use: Not on file   • Drug use: Not on file         Review of Systems   REVIEW OF SYSTEMS     Review of Systems   Constitutional: Negative for chills, fatigue and fever.   HENT: Positive for congestion, sinus pressure and sinus pain.    Respiratory: Negative for cough and shortness of breath.    Cardiovascular: Negative for chest pain.   Musculoskeletal: Negative for myalgias.   Neurological: Positive for headaches.         VITALS     ED Vitals    Date/Time Temp Pulse Resp BP SpO2 [facility]   09/13/21 1652 36.9 °C (98.4 °F) 82 -- 110/76 99 % AB                       Physical Exam   PHYSICAL EXAM     Physical Exam  Vitals reviewed.   Constitutional:       Appearance: Normal appearance.   HENT:      Nose:      Right Sinus: Frontal sinus tenderness present.      Left Sinus: No maxillary sinus tenderness or frontal sinus tenderness.   Cardiovascular:      Rate and Rhythm: Normal rate and regular rhythm.      Heart sounds: Normal heart sounds, S1 normal and S2 normal.   Pulmonary:      Effort: Pulmonary effort is normal.      Breath sounds: Normal breath sounds and air entry.   Musculoskeletal:      Cervical back: Normal range of motion and neck supple.   Lymphadenopathy:      Cervical: No cervical adenopathy.   Neurological:      General: No focal deficit present.      Mental Status: She is alert and oriented to person, place, and time.   Psychiatric:         Mood and Affect:  Mood normal.         Behavior: Behavior normal.         Thought Content: Thought content normal.         Judgment: Judgment normal.           PROCEDURES     Procedures     DATA     Results     None              No orders to display       Scoring tools                                 ED Course & MDM   MDM / ED COURSE and CLINICAL IMPRESSIONS     MDM  Number of Diagnoses or Management Options  Acute non-recurrent frontal sinusitis  Diagnosis management comments: Augmentin PO BID x 7 days        Clinical Impressions as of Sep 13 1655   Acute non-recurrent frontal sinusitis            Alyssia Hough CRNP  09/13/21 [id number]

## 2021-09-13 NOTE — ED ATTESTATION NOTE
I was immediately available to provide supervision and direction for the care of the patient.     Paramjit Owens,   09/13/21 1828

## 2021-09-13 NOTE — DISCHARGE INSTRUCTIONS
Begin Augmentin twice a day for 7 days  Take with food  Augmentin is safe to take while breastfeeding

## 2022-03-10 RX ORDER — NORETHINDRONE 0.35 MG/1
TABLET ORAL
Qty: 84 TABLET | Refills: 0 | Status: SHIPPED | OUTPATIENT
Start: 2022-03-10 | End: 2022-06-02

## 2022-03-10 NOTE — TELEPHONE ENCOUNTER
Medicine Refill Request    Last Office: 4/21/2021   Last Consult Visit: Visit date not found  Last Telemedicine Visit: 1/19/2021 Roxann Marquis DO    Next Appointment: 5/2/2022      Current Outpatient Medications:   •  docusate sodium (STOOL SOFTENER) 100 mg capsule, Take 100 mg by mouth 2 (two) times a day., Disp: , Rfl:   •  norethindrone (MICRONOR) 0.35 mg tablet, Take 1 tablet (0.35 mg total) by mouth daily., Disp: 84 tablet, Rfl: 3  •  prenatal vit-iron fum-folic ac 27 mg iron- 0.8 mg tablet tablet, Take 1 tablet by mouth daily., Disp: , Rfl:       BP Readings from Last 3 Encounters:   09/13/21 110/76   04/21/21 92/60   04/09/21 116/64       Recent Lab results:  No results found for: CHOL, No results found for: HDL, No results found for: LDLCALC, No results found for: TRIG     No results found for: GLUCOSE, No results found for: HGBA1C      No results found for: CREATININE    No results found for: TSH

## 2022-04-28 NOTE — PROGRESS NOTES
Patient is a 31 y.o. female who presents with amenorrhea.     Patient's last menstrual period was 2022 (exact date).  Menses interval q28-30 days  No vaginal bleeding  Mild nausea  No breast tenderness  + Fatigue       Lives with  and Omid (1)  LUZ, watching other children  No Zika travel in last 6 months   Pets at home - dog   Chicken pox as a child   COVID vaccinated, got series and booster     OB History:   OB History    Para Term  AB Living   2 1 1 0 1 1   SAB IAB Ectopic Multiple Live Births   0 0 0 0 1      # Outcome Date GA Lbr Andrea/2nd Weight Sex Delivery Anes PTL Lv   2 Term 21 39w4d  3880 g (8 lb 8.9 oz) M CS-LTranv EPI N REINALDO      Complications: Fetal Intolerance of Labor      Name: LUCIA CORNEJO      Apgar1: 8  Apgar5: 9   1 AB                Genetic Screening:   Congenital Heart Defect:  Neural tube defect:  Hemoglobinopathy or carrier:   Cystic fibrosis:  Chromosome abnormality:  Josue-Sachs/ [place]/ Familial Dysautonomia/ Huntingtons Chorea:  Sickle Cell Disease or Trait:    Hemophilia:   Intellectual disability/autism:   Recurrent pregnancy loss/stillbirth:   Other structural birth defect:   Other genetic disease:   All reviewed and negative for both patient and partner     Infection history:  Live with someone with TB or exposed to TB:  Patient or partner has history of genital herpes:  Rash or viral illness since last menstrual period:  Prior GBS infected child:  Positive history of sexually transmitted infection:  HIV infection:  History of hepatitis:  Recent travel history her partner travel outside of country:  Recent exposure to Zika virus including by partner:  All reviewed and negative for both patient and partner    Medical History:   Past Medical History:   Diagnosis Date   • Anxiety     used to see a therapist, previously on lexapro       Surgical History:   Past Surgical History:   Procedure Laterality Date   •  SECTION  2021   •  "TONSILLECTOMY & ADENOIDECTOMY  2012       Social History:   Social History     Social History Narrative   • Not on file       Family History:   Family History   Problem Relation Age of Onset   • Liver cancer Biological Father    • Ovarian cancer Father's Sister    • Liver cancer Father's Brother    • Breast cancer Neg Hx    • Colon cancer Neg Hx        Allergies: Patient has no known allergies.    Prior to Admission medications    Medication Sig Start Date End Date Taking? Authorizing Provider   docusate sodium (STOOL SOFTENER) 100 mg capsule Take 100 mg by mouth 2 (two) times a day.    ProviderCong MD   norethindrone (MICRONOR) 0.35 mg tablet TAKE 1 TABLET BY MOUTH EVERY DAY 3/10/22   Prema Davis CRNP   prenatal vit-iron fum-folic ac 27 mg iron- 0.8 mg tablet tablet Take 1 tablet by mouth daily.    ProviderCong MD       Review of Systems  Pertinent items are noted in HPI.    Objective     Vital Signs for the last 24 hours:  BP: (120)/(80) 120/80    Review of Systems and Physical Exam  The following have been reviewed and updated as appropriate in this visit:  Allergies  Meds         Visit Vitals  /80   Ht 1.575 m (5' 2\")   Wt 55.2 kg (121 lb 9.6 oz)   LMP 03/01/2022 (Exact Date)   BMI 22.24 kg/m²     HPI  Review of Systems  Physical Exam  Constitutional:       Appearance: She is well-developed.   Genitourinary:      Urethra, vagina, cervix, uterus and urethral meatus normal.      Uterus is regular.     External female genitalia normal.   Urethral meatus normal.   Urethra normal.   Normal bladder.   Vagina normal.   Cervix exam normal.  Uterus is normal. Uterine contour is regular.   Adnexa normal. Neck:      Thyroid: No thyromegaly.   Pulmonary:      Effort: Pulmonary effort is normal.   Chest:   Breasts:      Right: No inverted nipple, mass, nipple discharge, skin change or tenderness.      Left: No inverted nipple, mass, nipple discharge, skin change or tenderness.       Abdominal: "      General: There is no distension.      Palpations: Abdomen is soft. There is no mass.      Tenderness: There is no abdominal tenderness. There is no rebound.   Musculoskeletal:      Cervical back: Normal range of motion and neck supple.   Neurological:      Mental Status: She is alert and oriented to person, place, and time.   Psychiatric:         Behavior: Behavior normal.         Thought Content: Thought content normal.         Transvaginal ultrasound:   Anteverted uterus  SIUP   CRL 8 4/7 weeks + CA + YS   No adnexal masses     Assessment/Plan     Amenorrhea secondary to early pregnancy  TVUS c/w LMP      First OB labs ordered   Pap smear 4/2021 neg cytology + HPV (non 16/18), colposcopy with benign ECC   Repeat pap collected     History of CS with PPH  Complicated by uterine atony and postpartum hemorrhage - received Methergine, Hemabate x 2 and had a B dobson stitch placed.  Received 1000 mcg of rectal cytotec   Had IOL due to PROM, made it to 6 cm however needed CS due to NRFHT  Unsure re: mode of delivery   Reviewed options and risk of uterine rupture and subsequent morbidity/ mortality.  She is going to consider.    Genetic screening counseling options reviewed.  Patient declines    SMA and CF carrier testing neg from previous pregnancy    Practice, call, diet, exercise, pregnancy precautions discussed.    SAB precautions discussed    RTC 4 weeks   45 min spent collecting H&P, counseling, documenting

## 2022-05-02 ENCOUNTER — OFFICE VISIT (OUTPATIENT)
Dept: OBSTETRICS AND GYNECOLOGY | Facility: CLINIC | Age: 32
End: 2022-05-02
Payer: COMMERCIAL

## 2022-05-02 VITALS
DIASTOLIC BLOOD PRESSURE: 80 MMHG | HEIGHT: 62 IN | WEIGHT: 121.6 LBS | SYSTOLIC BLOOD PRESSURE: 120 MMHG | BODY MASS INDEX: 22.38 KG/M2

## 2022-05-02 DIAGNOSIS — N91.2 AMENORRHEA: Primary | ICD-10-CM

## 2022-05-02 DIAGNOSIS — Z34.81 ENCOUNTER FOR SUPERVISION OF OTHER NORMAL PREGNANCY IN FIRST TRIMESTER: ICD-10-CM

## 2022-05-02 PROCEDURE — 76801 OB US < 14 WKS SINGLE FETUS: CPT | Performed by: OBSTETRICS & GYNECOLOGY

## 2022-05-02 PROCEDURE — 99214 OFFICE O/P EST MOD 30 MIN: CPT | Performed by: OBSTETRICS & GYNECOLOGY

## 2022-05-02 NOTE — PRENATAL NOTE
Amenorrhea secondary to early pregnancy  TVUS c/w LMP      First OB labs ordered   Pap smear 4/2021 neg cytology + HPV (non 16/18), colposcopy with benign ECC   Repeat pap collected     History of CS with PPH  Complicated by uterine atony and postpartum hemorrhage - received Methergine, Hemabate x 2 and had a B dobson stitch placed.  Received 1000 mcg of rectal cytotec   Had IOL due to PROM, made it to 6 cm however needed CS due to NRFHT  Unsure re: mode of delivery   Reviewed options and risk of uterine rupture and subsequent morbidity/ mortality.  She is going to consider.    Genetic screening counseling options reviewed.  Patient declines    SMA and CF carrier testing neg from previous pregnancy    Practice, call, diet, exercise, pregnancy precautions discussed.    SAB precautions discussed    RTC 4 weeks

## 2022-05-05 LAB
APPEARANCE UR: CLEAR
BACTERIA #/AREA URNS HPF: NORMAL /HPF
BACTERIA UR CULT: NORMAL
BILIRUB UR QL STRIP: NEGATIVE
C TRACH RRNA SPEC QL NAA+PROBE: NOT DETECTED
CLINICAL INFO: ABNORMAL
COLOR UR: YELLOW
CYTO CVX: ABNORMAL
CYTOLOGIST CVX/VAG CYTO: ABNORMAL
CYTOLOGY CMNT CVX/VAG CYTO-IMP: ABNORMAL
DATE OF PREVIOUS PAP: ABNORMAL
DATE PREVIOUS BX: ABNORMAL
GEN CATEG CVX/VAG CYTO-IMP: ABNORMAL
GLUCOSE UR QL STRIP: NEGATIVE
HGB UR QL STRIP: NEGATIVE
HPV E6+E7 MRNA CVX QL NAA+PROBE: DETECTED
HYALINE CASTS #/AREA URNS LPF: NORMAL /LPF
KETONES UR QL STRIP: NEGATIVE
LEUKOCYTE ESTERASE UR QL STRIP: NEGATIVE
LMP START DATE: ABNORMAL
N GONORRHOEA RRNA SPEC QL NAA+PROBE: NOT DETECTED
NITRITE UR QL STRIP: NEGATIVE
PATHOLOGIST CVX/VAG CYTO: ABNORMAL
PH UR STRIP: 5.5 [PH] (ref 5–8)
PROT UR QL STRIP: NEGATIVE
QST (ALWAYS MESSAGE): ABNORMAL
QUEST COMMENT (PAP): ABNORMAL
RBC #/AREA URNS HPF: NORMAL /HPF
SERVICE CMNT-IMP: NORMAL
SP GR UR STRIP: 1.02 (ref 1–1.03)
SPECIMEN SOURCE CVX/VAG CYTO: ABNORMAL
SQUAMOUS #/AREA URNS HPF: NORMAL /HPF
STAT OF ADQ CVX/VAG CYTO-IMP: ABNORMAL
WBC #/AREA URNS HPF: NORMAL /HPF

## 2022-05-11 LAB
ABO GROUP BLD: NORMAL
BASOPHILS # BLD AUTO: 32 CELLS/UL (ref 0–200)
BASOPHILS NFR BLD AUTO: 0.4 %
BLD GP AB SCN SERPL QL: NORMAL
EOSINOPHIL # BLD AUTO: 97 CELLS/UL (ref 15–500)
EOSINOPHIL NFR BLD AUTO: 1.2 %
ERYTHROCYTE [DISTWIDTH] IN BLOOD BY AUTOMATED COUNT: 12.3 % (ref 11–15)
HBV SURFACE AG SERPL QL IA: NORMAL
HCT VFR BLD AUTO: 37.5 % (ref 35–45)
HCV AB S/CO SERPL IA: 0.08
HCV AB SERPL QL IA: NORMAL
HGB BLD-MCNC: 12.4 G/DL (ref 11.7–15.5)
HIV 1+2 AB+HIV1 P24 AG SERPL QL IA: NORMAL
LYMPHOCYTES # BLD AUTO: 1968 CELLS/UL (ref 850–3900)
LYMPHOCYTES NFR BLD AUTO: 24.3 %
MCH RBC QN AUTO: 29.7 PG (ref 27–33)
MCHC RBC AUTO-ENTMCNC: 33.1 G/DL (ref 32–36)
MCV RBC AUTO: 89.7 FL (ref 80–100)
MONOCYTES # BLD AUTO: 405 CELLS/UL (ref 200–950)
MONOCYTES NFR BLD AUTO: 5 %
NEUTROPHILS # BLD AUTO: 5597 CELLS/UL (ref 1500–7800)
NEUTROPHILS NFR BLD AUTO: 69.1 %
PLATELET # BLD AUTO: 228 THOUSAND/UL (ref 140–400)
PMV BLD REES-ECKER: 10.8 FL (ref 7.5–12.5)
RBC # BLD AUTO: 4.18 MILLION/UL (ref 3.8–5.1)
RH BLD: NORMAL
RPR SER QL: NORMAL
RUBV IGG SERPL IA-ACNC: 4.36 INDEX
VZV IGG SER IA-ACNC: 281.8 INDEX
WBC # BLD AUTO: 8.1 THOUSAND/UL (ref 3.8–10.8)

## 2022-06-02 ENCOUNTER — OFFICE VISIT (OUTPATIENT)
Dept: OBSTETRICS AND GYNECOLOGY | Facility: CLINIC | Age: 32
End: 2022-06-02
Payer: COMMERCIAL

## 2022-06-02 VITALS — BODY MASS INDEX: 22.13 KG/M2 | WEIGHT: 121 LBS | SYSTOLIC BLOOD PRESSURE: 110 MMHG | DIASTOLIC BLOOD PRESSURE: 60 MMHG

## 2022-06-02 DIAGNOSIS — R87.612 LGSIL ON PAP SMEAR OF CERVIX: ICD-10-CM

## 2022-06-02 DIAGNOSIS — Z36.3 ENCOUNTER FOR ANTENATAL SCREENING FOR MALFORMATION: ICD-10-CM

## 2022-06-02 DIAGNOSIS — Z34.81 ENCOUNTER FOR SUPERVISION OF OTHER NORMAL PREGNANCY IN FIRST TRIMESTER: Primary | ICD-10-CM

## 2022-06-02 DIAGNOSIS — Z3A.13 13 WEEKS GESTATION OF PREGNANCY: ICD-10-CM

## 2022-06-02 PROBLEM — Z34.90 PREGNANCY: Status: ACTIVE | Noted: 2022-06-02

## 2022-06-02 PROBLEM — O99.340 ANXIETY IN PREGNANCY, ANTEPARTUM: Status: ACTIVE | Noted: 2022-06-02

## 2022-06-02 PROBLEM — F41.9 ANXIETY IN PREGNANCY, ANTEPARTUM: Status: ACTIVE | Noted: 2022-06-02

## 2022-06-02 LAB
GLUCOSE URINE, POC: NEGATIVE
PROTEIN, POC: NEGATIVE

## 2022-06-02 PROCEDURE — 57455 BIOPSY OF CERVIX W/SCOPE: CPT | Performed by: OBSTETRICS & GYNECOLOGY

## 2022-06-02 PROCEDURE — 0502F SUBSEQUENT PRENATAL CARE: CPT | Performed by: OBSTETRICS & GYNECOLOGY

## 2022-06-02 PROCEDURE — 81002 URINALYSIS NONAUTO W/O SCOPE: CPT | Performed by: OBSTETRICS & GYNECOLOGY

## 2022-06-02 PROCEDURE — 99213 OFFICE O/P EST LOW 20 MIN: CPT | Mod: 25 | Performed by: OBSTETRICS & GYNECOLOGY

## 2022-06-02 NOTE — PRENATAL NOTE
- feeling pretty well. Nausea improving. No VB or cramping   - worsening anxiety- pt has remote h/o anxiety in her 20's for which she briefly took Lexapro. She ultimately stopped and has done well since then. More recently she is feeling increasing sense of anxiety about the pregnancy. Not impacting daily function, still able to focus and care for self/family. No SI/HI. Has appt to establish care with a therapist next week. Discussed risks/benefits of medication (I.e. lexapro/Zoloft), low risk to pregnancy or developing fetus. Reviewed poorly controlled anxiety/depression can have negative impact in fetal neurodevelopment. Pt will consider meds but for now wants to see how therapy goes.   - PNL WNL  - Aneuploidy screening- declines  - Carrier screen neg from prior pregnancy  - Rx MSAFP  - Rx FAS  - LSIL pap- colpo done today (see separate note)  - RTO 4 weeks

## 2022-06-02 NOTE — PROCEDURES
Colposcopy    Date/Time: 6/2/2022 2:01 PM  Performed by: Kacey Mcelroy MD  Authorized by: Kacey Mcelroy MD     Procedure location: cervix    Consent:     Patient questions answered: yes      Risks and benefits of the procedure and its alternatives discussed: yes      Procedural risks discussed:  Bleeding and need for repeat procedure    Consent obtained:  Written    Consent given by:  Patient  Indication:     Cervical indication(s): high-risk HPV positive and cervical LSIL    Pre-procedure:     Speculum was placed in the vagina: yes      Prep solution(s): acetic acid      Total amount used (mL):  5  Procedure:     Colposcopy with: colposcopy only and cervical biopsy      Biopsy taken: yes      # of biopsies:  1    Biopsy location(s): 3:00    Colposcopy details:  Entire SCJ visualized. Small area of AW with prominent vessels/mosaicism at 3:00    Ferric subsulfate solution applied: yes    Post-procedure:     Patient tolerance of procedure:  Patient tolerated the procedure well with no immediate complications    Estimated blood loss (mL):  1

## 2022-06-02 NOTE — PROGRESS NOTES
2022  Diana Sherman is a 31 y.o. female who presents for colposcopy    Pap 20 WNL HRHPV pos 16/18 neg  Pap 2021 neg cytology HRHPV pos, 16/18 neg  Colpo 5/3/21 No lesions, ECC neg  Pap 22 LGSIL HRHPV pos    Currently 13w2d  Feeling well. Nausea improving. No VB or LOF    PMHx: anxiety  PSHx: LTCS, tonsillectomy  POBHx: LTCS- NRFHT, c/b PPH  PGYNHx: no abnl paps or STIs. No ovarian cysts or fibroids  FamHx: M-a. F-a. Liver Ca     No h/o Br, ov, col, endo or panc CA  Social:   T/E/D: none  Marital:   Work: [employer]    Review of Systems and Physical Exam  The following have been reviewed and updated as appropriate in this visit:  Allergies  Meds  Problems         Visit Vitals  /60   Wt 54.9 kg (121 lb)   LMP 2022 (Exact Date)   BMI 22.13 kg/m²     HPI  Review of Systems    Physical Exam  Constitutional:       Appearance: Normal appearance.   Genitourinary:      Pelvic exam was performed with patient in the lithotomy position.      Urethra, vagina and urethral meatus normal.      No cervical motion tenderness, discharge, friability or polyp.         Pelvic exam was performed with patient in lithotomy exam position.     External female genitalia normal.   Urethral meatus normal.   Urethra normal.   Normal bladder.   Vagina normal. Pulmonary:      Effort: Pulmonary effort is normal.   Abdominal:      General: Abdomen is flat.      Palpations: Abdomen is soft.   Neurological:      General: No focal deficit present.      Mental Status: She is alert and oriented to person, place, and time.   Skin:     General: Skin is warm and dry.   Psychiatric:         Mood and Affect: Mood normal.         Behavior: Behavior normal.   Vitals reviewed.          30 yo  at 13w2d with LGSIL pap  - reviewed pathophysiology of HPV, abnormal paps, risk of progression to cervical cancer  - discussed purpose of colposcopy, potential results  - reviewed risk of bleeding, pain, infection. No increased risk of  SAB.   - written and verbal consent obtained  - colposcopy performed, biopsy at 3:00 collected  - will call with results  Return in about 4 weeks (around 6/30/2022) for Prenatal Care.  Kacey Mcelroy MD

## 2022-06-03 ENCOUNTER — HOSPITAL ENCOUNTER (OUTPATIENT)
Facility: CLINIC | Age: 32
Discharge: HOME | End: 2022-06-03
Attending: INTERNAL MEDICINE
Payer: COMMERCIAL

## 2022-06-03 VITALS
SYSTOLIC BLOOD PRESSURE: 125 MMHG | RESPIRATION RATE: 18 BRPM | OXYGEN SATURATION: 99 % | TEMPERATURE: 98.9 F | HEART RATE: 98 BPM | DIASTOLIC BLOOD PRESSURE: 80 MMHG

## 2022-06-03 DIAGNOSIS — J01.00 ACUTE NON-RECURRENT MAXILLARY SINUSITIS: Primary | ICD-10-CM

## 2022-06-03 PROCEDURE — S9083 URGENT CARE CENTER GLOBAL: HCPCS | Performed by: NURSE PRACTITIONER

## 2022-06-03 PROCEDURE — 99213 OFFICE O/P EST LOW 20 MIN: CPT | Performed by: NURSE PRACTITIONER

## 2022-06-03 RX ORDER — AMOXICILLIN 875 MG/1
875 TABLET, FILM COATED ORAL 2 TIMES DAILY
Qty: 20 TABLET | Refills: 0 | Status: SHIPPED | OUTPATIENT
Start: 2022-06-03 | End: 2022-06-13

## 2022-06-03 ASSESSMENT — ENCOUNTER SYMPTOMS
VOMITING: 0
CHILLS: 1
SINUS PAIN: 1
ABDOMINAL PAIN: 0
RHINORRHEA: 1
SORE THROAT: 0
DIARRHEA: 0
SINUS PRESSURE: 1
HEADACHES: 1
MYALGIAS: 0
NAUSEA: 1
COUGH: 0

## 2022-06-03 NOTE — DISCHARGE INSTRUCTIONS
Amoxicillin twice per day for 7 days.  Complete the entire course of antibiotics even after you feel better.   Increase yogurt intake and/or take an oral probiotic (not within 2 hours of antibiotic) to prevent side effects.  Mucinex twice per day which will help thin and loosen mucus in the airways, clear congestion, and make it easier to breathe.   Saline nasal sprays and/or neti pot several times per day.  Stay well hydrated; drink enough fluids so that your urine is pale yellow.   Acetaminophen (Tylenol) as directed as needed for pain.   Warm tea with honey may all be helpful for your symptoms.    If symptoms worsen or do not improve in 48-72 hours, follow-up with your GYN.  If you develop chest pain, severe shortness of breath or difficulty breathing, report to the emergency room.    Thank you for choosing [facility]!

## 2022-06-03 NOTE — ED PROVIDER NOTES
Emergency Medicine Note  HPI   HISTORY OF PRESENT ILLNESS     30 y/o female who is 13 weeks pregnant presents with nasal congestion for 2 weeks. Over the last couple days, symptoms have worsening and she developed headache, facial pain, sinus pain / pressure, and thick nasal drainage. Using neti pot twice per day for 1 week without improvement. COVID vaccinated and boosted. Watches children who have been sick. At-home COVID test was negative.             Patient History   PAST HISTORY     Reviewed from Nursing Triage:         Past Medical History:   Diagnosis Date   • Anxiety     used to see a therapist, previously on lexapro       Past Surgical History:   Procedure Laterality Date   •  SECTION  2021   • TONSILLECTOMY & ADENOIDECTOMY         Family History   Problem Relation Age of Onset   • Liver cancer Biological Father    • Ovarian cancer Father's Sister    • Liver cancer Father's Brother    • Breast cancer Neg Hx    • Colon cancer Neg Hx        Social History     Tobacco Use   • Smoking status: Never Smoker   • Smokeless tobacco: Never Used   Substance Use Topics   • Alcohol use: Not Currently   • Drug use: Never         Review of Systems   REVIEW OF SYSTEMS     Review of Systems   Constitutional: Positive for chills.   HENT: Positive for congestion, rhinorrhea, sinus pressure and sinus pain. Negative for ear pain (right) and sore throat.    Respiratory: Negative for cough.    Cardiovascular: Negative for chest pain.   Gastrointestinal: Positive for nausea. Negative for abdominal pain, diarrhea and vomiting.   Musculoskeletal: Negative for myalgias.   Skin: Negative for rash.   Neurological: Positive for headaches.         VITALS     ED Vitals    Date/Time Temp Pulse Resp BP SpO2 [facility]   22 192 37.2 °C (98.9 °F) 98 -- 125/80 99 % RC                       Physical Exam   PHYSICAL EXAM     Physical Exam  Vitals reviewed.   Constitutional:       General: She is awake. She is not in acute  distress.     Appearance: Normal appearance. She is well-developed, well-groomed and normal weight. She is not ill-appearing, toxic-appearing or diaphoretic.   HENT:      Right Ear: Tympanic membrane and ear canal normal. No tenderness.      Left Ear: Tympanic membrane and ear canal normal. No tenderness.      Nose: No mucosal edema or rhinorrhea.      Right Sinus: Maxillary sinus tenderness present. No frontal sinus tenderness.      Left Sinus: No maxillary sinus tenderness or frontal sinus tenderness.      Mouth/Throat:      Lips: Pink.      Mouth: Mucous membranes are moist.      Pharynx: Oropharynx is clear. Uvula midline. No pharyngeal swelling, oropharyngeal exudate, posterior oropharyngeal erythema or uvula swelling.      Tonsils: No tonsillar exudate.   Eyes:      Conjunctiva/sclera: Conjunctivae normal.   Cardiovascular:      Rate and Rhythm: Normal rate and regular rhythm.      Heart sounds: Normal heart sounds.   Pulmonary:      Effort: Pulmonary effort is normal.      Breath sounds: Normal breath sounds. No wheezing or rhonchi.   Skin:     General: Skin is warm.   Neurological:      Mental Status: She is alert.   Psychiatric:         Behavior: Behavior is cooperative.           PROCEDURES     Procedures     DATA     Results     None              No orders to display       Scoring tools                                 ED Course & MDM   MDM / ED COURSE / CLINICAL IMPRESSIONS / DISPO     MDM  Number of Diagnoses or Management Options  Acute non-recurrent maxillary sinusitis  Diagnosis management comments:     30 y/o female who is 13 weeks pregnant presents with nasal congestion for 2 weeks. Over the last couple days, symptoms have worsening and she developed headache, facial pain, sinus pain / pressure, and thick nasal drainage  Generally well-appearing in NAD. Vitals are normal. On exam, she has right-sided maxillary sinus tenderness.  Given worsening symptoms for > 2 weeks despite conservative  treatment  Will treat with Amoxicillin BID x 10 days, Mucinex, continue saline rinse /  neti pot.  Strict return precautions given. Patient was given opportunity to ask questions. Patient verbalized understanding and agreeable with plan. See dispo for full care plan.        Clinical Impressions as of 06/03/22 1940   Acute non-recurrent maxillary sinusitis              Francia Noriega CRNP  06/03/22 1942

## 2022-06-04 ENCOUNTER — TELEPHONE (OUTPATIENT)
Dept: OBSTETRICS AND GYNECOLOGY | Facility: CLINIC | Age: 32
End: 2022-06-04
Payer: COMMERCIAL

## 2022-06-04 NOTE — TELEPHONE ENCOUNTER
Spoke with Rosamaria. She is 13 weeks pregnant. She has a sinus infection and went to urgent care. They prescribed her amoxicillin. She was inquiring about the safety in pregnancy. I told her that penicillins are very safe in pregnancy. She was also asking about tylenol and I told her she can take 2 extra strength tylenol every 6 hours if needed. She reports that she has taken one dose of amoxicillin she has noticed a rash develop over the side of her face that hurts. We discussed that this could just be a reaction to the sinus infection, however it could be a response to the antibiotic. Shew ill monitor as she continues to take it. If this worsens she will need to go back to urgent care.     Taylor Wooten MD

## 2022-06-04 NOTE — ED ATTESTATION NOTE
I was immediately available to provide supervision and direction for the care of the patient.     Paramjit Owens DO  06/03/22 2024

## 2022-06-06 LAB
CLINICAL INFO: NORMAL
PATH REPORT.FINAL DX SPEC: NORMAL
PATH REPORT.GROSS SPEC: NORMAL
PATHOLOGIST NAME: NORMAL
PROCEDURE TYPE: NORMAL
SPECIMEN SOURCE: NORMAL

## 2022-06-30 ENCOUNTER — OFFICE VISIT (OUTPATIENT)
Dept: OBSTETRICS AND GYNECOLOGY | Facility: CLINIC | Age: 32
End: 2022-06-30
Payer: COMMERCIAL

## 2022-06-30 VITALS
SYSTOLIC BLOOD PRESSURE: 106 MMHG | DIASTOLIC BLOOD PRESSURE: 64 MMHG | HEIGHT: 62 IN | WEIGHT: 124.2 LBS | BODY MASS INDEX: 22.86 KG/M2

## 2022-06-30 DIAGNOSIS — Z34.82 ENCOUNTER FOR SUPERVISION OF OTHER NORMAL PREGNANCY IN SECOND TRIMESTER: Primary | ICD-10-CM

## 2022-06-30 LAB
GLUCOSE URINE, POC: NEGATIVE
PROTEIN, POC: NEGATIVE

## 2022-06-30 PROCEDURE — 81002 URINALYSIS NONAUTO W/O SCOPE: CPT | Performed by: OBSTETRICS & GYNECOLOGY

## 2022-06-30 PROCEDURE — 0502F SUBSEQUENT PRENATAL CARE: CPT | Performed by: OBSTETRICS & GYNECOLOGY

## 2022-06-30 NOTE — PRENATAL NOTE
Doing well  Has 20 wk US scheduled  Has Rx for MSAFP  TOLAC vs RCS discussed. R/B of each reviewed. Is still considering options  RTO 4 wks

## 2022-07-05 LAB
# FETUSES US: 1
AFP ADJ MOM SERPL: 1.21
AFP INTERP SERPL-IMP: NORMAL
AFP SERPL-MCNC: 58.7 NG/ML
AGE: NORMAL
CLINICAL INFO: YES
DONATED EGG PATIENT QL: NO
GA CLIN EST: 17.6 WEEKS
GA METHOD: NORMAL
HX OF NTD NARR: NO
HX OF TRISOMY 21 NARR: NO
IDDM PATIENT QL: NO
NEURAL TUBE DEFECT RISK FETUS: NORMAL %
SERVICE CMNT-IMP: NORMAL
SERVICE CMNT-IMP: NORMAL

## 2022-07-20 ENCOUNTER — HOSPITAL ENCOUNTER (OUTPATIENT)
Dept: PERINATAL CARE | Facility: HOSPITAL | Age: 32
Discharge: HOME | End: 2022-07-20
Attending: OBSTETRICS & GYNECOLOGY
Payer: COMMERCIAL

## 2022-07-20 DIAGNOSIS — O35.9XX0 SUSPECTED FETAL ABNORMALITY AFFECTING MANAGEMENT OF MOTHER, ANTEPARTUM, SINGLE OR UNSPECIFIED FETUS: Primary | ICD-10-CM

## 2022-07-20 DIAGNOSIS — Z36.3 ENCOUNTER FOR ANTENATAL SCREENING FOR MALFORMATION: ICD-10-CM

## 2022-07-20 DIAGNOSIS — Z3A.20 20 WEEKS GESTATION OF PREGNANCY: ICD-10-CM

## 2022-07-20 PROCEDURE — 76805 OB US >/= 14 WKS SNGL FETUS: CPT

## 2022-07-27 NOTE — PRENATAL NOTE
Feeling good  FAS WNL  msAFP low risk   History of CS with PPH  Complicated by uterine atony and postpartum hemorrhage - received Methergine, Hemabate x 2 and had a B dobson stitch placed.  Received 1000 mcg of rectal cytotec   Had IOL due to PROM, made it to 6 cm however needed CS due to NRFHT  Still uncertain regarding mode of delivery, schedule RCS at 40 weeks for now   RX 28 weeks  RTC 4 weeks

## 2022-07-28 ENCOUNTER — OFFICE VISIT (OUTPATIENT)
Dept: OBSTETRICS AND GYNECOLOGY | Facility: CLINIC | Age: 32
End: 2022-07-28
Payer: COMMERCIAL

## 2022-07-28 VITALS — WEIGHT: 131 LBS | DIASTOLIC BLOOD PRESSURE: 62 MMHG | BODY MASS INDEX: 23.96 KG/M2 | SYSTOLIC BLOOD PRESSURE: 122 MMHG

## 2022-07-28 DIAGNOSIS — Z34.82 ENCOUNTER FOR SUPERVISION OF OTHER NORMAL PREGNANCY IN SECOND TRIMESTER: Primary | ICD-10-CM

## 2022-07-28 LAB
GLUCOSE URINE, POC: NEGATIVE
PROTEIN, POC: NEGATIVE

## 2022-07-28 PROCEDURE — 81002 URINALYSIS NONAUTO W/O SCOPE: CPT | Performed by: OBSTETRICS & GYNECOLOGY

## 2022-07-28 PROCEDURE — 0502F SUBSEQUENT PRENATAL CARE: CPT | Performed by: OBSTETRICS & GYNECOLOGY

## 2022-08-20 LAB
ERYTHROCYTE [DISTWIDTH] IN BLOOD BY AUTOMATED COUNT: 12.8 % (ref 11–15)
GLUCOSE 1H P 50 G GLC PO SERPL-MCNC: 88 MG/DL
HCT VFR BLD AUTO: 35.2 % (ref 35–45)
HGB BLD-MCNC: 11.8 G/DL (ref 11.7–15.5)
MCH RBC QN AUTO: 30.8 PG (ref 27–33)
MCHC RBC AUTO-ENTMCNC: 33.5 G/DL (ref 32–36)
MCV RBC AUTO: 91.9 FL (ref 80–100)
PLATELET # BLD AUTO: 175 THOUSAND/UL (ref 140–400)
PMV BLD REES-ECKER: 11.1 FL (ref 7.5–12.5)
RBC # BLD AUTO: 3.83 MILLION/UL (ref 3.8–5.1)
RPR SER QL: NORMAL
WBC # BLD AUTO: 9.1 THOUSAND/UL (ref 3.8–10.8)

## 2022-08-24 ENCOUNTER — OFFICE VISIT (OUTPATIENT)
Dept: OBSTETRICS AND GYNECOLOGY | Facility: CLINIC | Age: 32
End: 2022-08-24
Payer: COMMERCIAL

## 2022-08-24 VITALS — SYSTOLIC BLOOD PRESSURE: 116 MMHG | WEIGHT: 137 LBS | BODY MASS INDEX: 25.06 KG/M2 | DIASTOLIC BLOOD PRESSURE: 66 MMHG

## 2022-08-24 DIAGNOSIS — Z34.82 ENCOUNTER FOR SUPERVISION OF OTHER NORMAL PREGNANCY IN SECOND TRIMESTER: Primary | ICD-10-CM

## 2022-08-24 LAB
GLUCOSE URINE, POC: NEGATIVE
PROTEIN, POC: NEGATIVE

## 2022-08-24 PROCEDURE — 0502F SUBSEQUENT PRENATAL CARE: CPT | Performed by: OBSTETRICS & GYNECOLOGY

## 2022-08-24 PROCEDURE — 81002 URINALYSIS NONAUTO W/O SCOPE: CPT | Performed by: OBSTETRICS & GYNECOLOGY

## 2022-08-24 NOTE — PRENATAL NOTE
Feeling good    History of CS with PPH  Complicated by uterine atony and postpartum hemorrhage - received Methergine, Hemabate x 2 and had a B dobson stitch placed.  Received 1000 mcg of rectal cytotec   Had IOL due to PROM, made it to 6 cm however needed CS due to NRFHT  Still uncertain regarding mode of delivery, plan 36 week US, RCS planned 12/6  28 week labs WNL  Tdap at next appt   Reviewed FKC/ PTL precautions  RTC 3 weeks

## 2022-09-26 NOTE — PRENATAL NOTE
- feeling well, no signs of PTL  - active FM  - GTT/CBC/RPR all WNL  - h/o c/s, considering  TOLAC if labors before 40 weeks. Has back up c/s scheduled for 40 weeks with EM. Discussed TOLAC vs RCS: Discussed risks of TOLAC including need for emergent c/s, maternal or fetal hemorrhage or death. Reviewed risk of planned RCS including infection, bleeding, damage to surrounding structures or baby, implications for subsequent pregnancy (need for RCS, placentation issues). Reviewed factors predicting success. Rx growth US for 36 weeks  - TDaP today  - Flu shot- planning for next time  - RTO 2 weeks

## 2022-09-29 ENCOUNTER — OFFICE VISIT (OUTPATIENT)
Dept: OBSTETRICS AND GYNECOLOGY | Facility: CLINIC | Age: 32
End: 2022-09-29
Payer: COMMERCIAL

## 2022-09-29 VITALS — BODY MASS INDEX: 26.45 KG/M2 | DIASTOLIC BLOOD PRESSURE: 70 MMHG | WEIGHT: 144.6 LBS | SYSTOLIC BLOOD PRESSURE: 110 MMHG

## 2022-09-29 DIAGNOSIS — Z98.891 HISTORY OF CESAREAN DELIVERY: ICD-10-CM

## 2022-09-29 DIAGNOSIS — Z23 NEED FOR TDAP VACCINATION: ICD-10-CM

## 2022-09-29 DIAGNOSIS — Z34.83 ENCOUNTER FOR SUPERVISION OF OTHER NORMAL PREGNANCY IN THIRD TRIMESTER: Primary | ICD-10-CM

## 2022-09-29 DIAGNOSIS — Z3A.30 30 WEEKS GESTATION OF PREGNANCY: ICD-10-CM

## 2022-09-29 LAB
GLUCOSE URINE, POC: NEGATIVE
PROTEIN, POC: NEGATIVE

## 2022-09-29 PROCEDURE — 0502F SUBSEQUENT PRENATAL CARE: CPT | Performed by: OBSTETRICS & GYNECOLOGY

## 2022-09-29 PROCEDURE — 90471 IMMUNIZATION ADMIN: CPT | Performed by: OBSTETRICS & GYNECOLOGY

## 2022-09-29 PROCEDURE — 90715 TDAP VACCINE 7 YRS/> IM: CPT | Performed by: OBSTETRICS & GYNECOLOGY

## 2022-09-29 PROCEDURE — 81002 URINALYSIS NONAUTO W/O SCOPE: CPT | Performed by: OBSTETRICS & GYNECOLOGY

## 2022-10-13 ENCOUNTER — OFFICE VISIT (OUTPATIENT)
Dept: OBSTETRICS AND GYNECOLOGY | Facility: CLINIC | Age: 32
End: 2022-10-13
Payer: COMMERCIAL

## 2022-10-13 VITALS — DIASTOLIC BLOOD PRESSURE: 62 MMHG | SYSTOLIC BLOOD PRESSURE: 104 MMHG | WEIGHT: 148.2 LBS | BODY MASS INDEX: 27.11 KG/M2

## 2022-10-13 DIAGNOSIS — Z3A.32 32 WEEKS GESTATION OF PREGNANCY: ICD-10-CM

## 2022-10-13 DIAGNOSIS — Z34.83 ENCOUNTER FOR SUPERVISION OF OTHER NORMAL PREGNANCY IN THIRD TRIMESTER: Primary | ICD-10-CM

## 2022-10-13 DIAGNOSIS — Z23 NEEDS FLU SHOT: ICD-10-CM

## 2022-10-13 LAB
GLUCOSE URINE, POC: NEGATIVE
PROTEIN, POC: NEGATIVE

## 2022-10-13 PROCEDURE — 0502F SUBSEQUENT PRENATAL CARE: CPT | Performed by: OBSTETRICS & GYNECOLOGY

## 2022-10-13 PROCEDURE — 90686 IIV4 VACC NO PRSV 0.5 ML IM: CPT | Performed by: OBSTETRICS & GYNECOLOGY

## 2022-10-13 PROCEDURE — 81002 URINALYSIS NONAUTO W/O SCOPE: CPT | Performed by: OBSTETRICS & GYNECOLOGY

## 2022-10-13 PROCEDURE — 90471 IMMUNIZATION ADMIN: CPT | Performed by: OBSTETRICS & GYNECOLOGY

## 2022-10-13 NOTE — PRENATAL NOTE
- feeling well, no sx PTL  - active FM  - h/o c/s, considering TOLAC. Growth US planned for 11/10/22  - got TDap  - flu shot today  - PTL precautions/FKC reviewed  - RTO 2 weeks

## 2022-10-27 ENCOUNTER — OFFICE VISIT (OUTPATIENT)
Dept: OBSTETRICS AND GYNECOLOGY | Facility: CLINIC | Age: 32
End: 2022-10-27
Payer: COMMERCIAL

## 2022-10-27 VITALS — BODY MASS INDEX: 27.87 KG/M2 | DIASTOLIC BLOOD PRESSURE: 70 MMHG | WEIGHT: 152.4 LBS | SYSTOLIC BLOOD PRESSURE: 114 MMHG

## 2022-10-27 DIAGNOSIS — Z34.83 ENCOUNTER FOR SUPERVISION OF OTHER NORMAL PREGNANCY IN THIRD TRIMESTER: Primary | ICD-10-CM

## 2022-10-27 LAB
GLUCOSE URINE, POC: NEGATIVE
PROTEIN, POC: NEGATIVE

## 2022-10-27 PROCEDURE — 0502F SUBSEQUENT PRENATAL CARE: CPT | Performed by: OBSTETRICS & GYNECOLOGY

## 2022-10-27 PROCEDURE — 81002 URINALYSIS NONAUTO W/O SCOPE: CPT | Performed by: OBSTETRICS & GYNECOLOGY

## 2022-10-27 NOTE — PRENATAL NOTE
Doing well  Active FM  Labor S/Sx's rev  Leaning toward RCS, will re-schedule closer to 39 weeks.  Has PTC growth US 11/10  RTO 1 wk

## 2022-11-03 PROBLEM — Z36.3 ENCOUNTER FOR ANTENATAL SCREENING FOR MALFORMATION: Status: RESOLVED | Noted: 2022-06-02 | Resolved: 2022-11-03

## 2022-11-09 ENCOUNTER — OFFICE VISIT (OUTPATIENT)
Dept: OBSTETRICS AND GYNECOLOGY | Facility: CLINIC | Age: 32
End: 2022-11-09
Payer: COMMERCIAL

## 2022-11-09 VITALS — WEIGHT: 154.8 LBS | BODY MASS INDEX: 28.31 KG/M2 | DIASTOLIC BLOOD PRESSURE: 60 MMHG | SYSTOLIC BLOOD PRESSURE: 100 MMHG

## 2022-11-09 DIAGNOSIS — Z34.83 ENCOUNTER FOR SUPERVISION OF OTHER NORMAL PREGNANCY IN THIRD TRIMESTER: Primary | ICD-10-CM

## 2022-11-09 LAB
GLUCOSE URINE, POC: NEGATIVE
PROTEIN, POC: NEGATIVE

## 2022-11-09 PROCEDURE — 81002 URINALYSIS NONAUTO W/O SCOPE: CPT | Performed by: OBSTETRICS & GYNECOLOGY

## 2022-11-09 PROCEDURE — 0502F SUBSEQUENT PRENATAL CARE: CPT | Performed by: OBSTETRICS & GYNECOLOGY

## 2022-11-09 NOTE — PRENATAL NOTE
- Feels well. Starting to get uncomfortable and some hip discomfort.  - GBS done. Cvx closed. cephalic on Bedside US.  - Hx CD: scheduled for RCS 11/29 at 1330 with EM. Considering TOLAC, growth US 11/10.  - Labor/fkc precautions.  - RTO 1wk.

## 2022-11-10 ENCOUNTER — HOSPITAL ENCOUNTER (OUTPATIENT)
Dept: PERINATAL CARE | Facility: HOSPITAL | Age: 32
Discharge: HOME | End: 2022-11-10
Attending: OBSTETRICS & GYNECOLOGY
Payer: COMMERCIAL

## 2022-11-10 DIAGNOSIS — O26.843 UTERINE SIZE DATE DISCREPANCY PREGNANCY, THIRD TRIMESTER: Primary | ICD-10-CM

## 2022-11-10 DIAGNOSIS — Z03.74 SUSPECTED PROBLEM WITH FETAL GROWTH NOT FOUND: ICD-10-CM

## 2022-11-10 DIAGNOSIS — Z98.891 HISTORY OF CESAREAN DELIVERY: ICD-10-CM

## 2022-11-10 DIAGNOSIS — Z3A.36 36 WEEKS GESTATION OF PREGNANCY: ICD-10-CM

## 2022-11-10 PROCEDURE — 76816 OB US FOLLOW-UP PER FETUS: CPT

## 2022-11-13 PROBLEM — O32.0XX0 UNSTABLE FETAL LIE: Status: ACTIVE | Noted: 2022-11-13

## 2022-11-13 LAB
GP B STREP DNA SPEC QL NAA+PROBE: NOT DETECTED
SPECIMEN SOURCE: NORMAL

## 2022-11-16 ENCOUNTER — OFFICE VISIT (OUTPATIENT)
Dept: OBSTETRICS AND GYNECOLOGY | Facility: CLINIC | Age: 32
End: 2022-11-16
Payer: COMMERCIAL

## 2022-11-16 VITALS
BODY MASS INDEX: 28.82 KG/M2 | HEIGHT: 62 IN | SYSTOLIC BLOOD PRESSURE: 112 MMHG | WEIGHT: 156.6 LBS | DIASTOLIC BLOOD PRESSURE: 70 MMHG

## 2022-11-16 DIAGNOSIS — Z34.83 ENCOUNTER FOR SUPERVISION OF OTHER NORMAL PREGNANCY IN THIRD TRIMESTER: Primary | ICD-10-CM

## 2022-11-16 PROBLEM — O32.0XX0 UNSTABLE FETAL LIE: Status: RESOLVED | Noted: 2022-11-13 | Resolved: 2022-11-16

## 2022-11-16 LAB
GLUCOSE URINE, POC: NEGATIVE
PROTEIN, POC: NEGATIVE

## 2022-11-16 PROCEDURE — 81002 URINALYSIS NONAUTO W/O SCOPE: CPT | Performed by: OBSTETRICS & GYNECOLOGY

## 2022-11-16 PROCEDURE — 0502F SUBSEQUENT PRENATAL CARE: CPT | Performed by: OBSTETRICS & GYNECOLOGY

## 2022-11-16 NOTE — PRENATAL NOTE
"- Feels well.  - GBS negative  - PTC US 11/10: TRV, MARCIE 10.9, EFW 6lb 12oz, 59% -> pt was told baby was cephalic, baby cephalic today on Leopold's, \"TRV\" likely an error on report.  - Hx CD for NRFHT: scheduled for RCS 11/29 at 1330 with EM. Considering TOLAC, s/p growth US. Wash kit given. Discussed NPO after MN before c/s.  - Labor/fkc precautions.  - RTO 1wk.  "

## 2022-11-22 NOTE — PRENATAL NOTE
Feeling good   Hx CD for NRFHT: scheduled for RCS 11/29 at 1330 with EM. Considering TOLAC, s/p growth US.  Consents signed   Reviewed labor/ FKC precautions

## 2022-11-23 ENCOUNTER — OFFICE VISIT (OUTPATIENT)
Dept: OBSTETRICS AND GYNECOLOGY | Facility: CLINIC | Age: 32
End: 2022-11-23
Payer: COMMERCIAL

## 2022-11-23 ENCOUNTER — PREP FOR CASE (OUTPATIENT)
Dept: OBSTETRICS AND GYNECOLOGY | Facility: CLINIC | Age: 32
End: 2022-11-23

## 2022-11-23 VITALS — SYSTOLIC BLOOD PRESSURE: 104 MMHG | WEIGHT: 158 LBS | BODY MASS INDEX: 28.9 KG/M2 | DIASTOLIC BLOOD PRESSURE: 64 MMHG

## 2022-11-23 DIAGNOSIS — Z34.83 ENCOUNTER FOR SUPERVISION OF OTHER NORMAL PREGNANCY IN THIRD TRIMESTER: Primary | ICD-10-CM

## 2022-11-23 LAB
GLUCOSE URINE, POC: NEGATIVE
PROTEIN, POC: NEGATIVE

## 2022-11-23 PROCEDURE — 81002 URINALYSIS NONAUTO W/O SCOPE: CPT | Performed by: OBSTETRICS & GYNECOLOGY

## 2022-11-23 PROCEDURE — 0502F SUBSEQUENT PRENATAL CARE: CPT | Performed by: OBSTETRICS & GYNECOLOGY

## 2022-11-23 RX ORDER — ACETAMINOPHEN 325 MG/1
975 TABLET ORAL ONCE
Status: CANCELLED | OUTPATIENT
Start: 2022-11-23 | End: 2022-11-23

## 2022-11-23 RX ORDER — SODIUM CHLORIDE, SODIUM LACTATE, POTASSIUM CHLORIDE, CALCIUM CHLORIDE 600; 310; 30; 20 MG/100ML; MG/100ML; MG/100ML; MG/100ML
150 INJECTION, SOLUTION INTRAVENOUS CONTINUOUS
Status: CANCELLED | OUTPATIENT
Start: 2022-11-23 | End: 2022-11-30

## 2022-11-23 RX ORDER — ONDANSETRON 4 MG/1
8 TABLET, ORALLY DISINTEGRATING ORAL ONCE
Status: CANCELLED | OUTPATIENT
Start: 2022-11-23 | End: 2022-11-23

## 2022-11-23 RX ORDER — OXYTOCIN 10 [USP'U]/ML
10 INJECTION, SOLUTION INTRAMUSCULAR; INTRAVENOUS ONCE AS NEEDED
Status: CANCELLED | OUTPATIENT
Start: 2022-11-23

## 2022-11-23 RX ORDER — ACETAMINOPHEN 650 MG/1
650 SUPPOSITORY RECTAL ONCE
Status: CANCELLED | OUTPATIENT
Start: 2022-11-23 | End: 2022-11-24

## 2022-11-23 RX ORDER — SODIUM CHLORIDE, SODIUM LACTATE, POTASSIUM CHLORIDE, CALCIUM CHLORIDE 600; 310; 30; 20 MG/100ML; MG/100ML; MG/100ML; MG/100ML
80 INJECTION, SOLUTION INTRAVENOUS CONTINUOUS
Status: CANCELLED | OUTPATIENT
Start: 2022-11-23 | End: 2022-11-24

## 2022-11-23 RX ORDER — SODIUM CITRATE AND CITRIC ACID MONOHYDRATE 334; 500 MG/5ML; MG/5ML
30 SOLUTION ORAL ONCE
Status: CANCELLED | OUTPATIENT
Start: 2022-11-23 | End: 2022-11-23

## 2022-11-23 RX ORDER — MISOPROSTOL 100 UG/1
1000 TABLET ORAL ONCE AS NEEDED
Status: CANCELLED | OUTPATIENT
Start: 2022-11-23

## 2022-11-23 RX ORDER — OXYTOCIN/0.9 % SODIUM CHLORIDE 40/1000ML
500 PLASTIC BAG, INJECTION (ML) INTRAVENOUS ONCE
Status: CANCELLED | OUTPATIENT
Start: 2022-11-23 | End: 2022-11-23

## 2022-11-23 RX ORDER — CARBOPROST TROMETHAMINE 250 UG/ML
250 INJECTION, SOLUTION INTRAMUSCULAR ONCE AS NEEDED
Status: CANCELLED | OUTPATIENT
Start: 2022-11-23

## 2022-11-23 RX ORDER — OXYTOCIN/0.9 % SODIUM CHLORIDE 40/1000ML
PLASTIC BAG, INJECTION (ML) INTRAVENOUS CONTINUOUS
Status: CANCELLED | OUTPATIENT
Start: 2022-11-23 | End: 2022-11-23

## 2022-11-23 RX ORDER — METHYLERGONOVINE MALEATE 0.2 MG/ML
200 INJECTION INTRAVENOUS ONCE AS NEEDED
Status: CANCELLED | OUTPATIENT
Start: 2022-11-23

## 2022-11-23 NOTE — H&P
HPI     Rosamaria Sherman is a 31 y.o. female  at 39 0/7 with an estimated due date of 2022, by Last Menstrual Period who presents for RCS    Pregnancy complicated by:  History of CS due to NRFHT at 6 cm- complicated by uterine atony and PPH.        Anterior placenta      OB History:   OB History    Para Term  AB Living   3 1 1 0 1 1   SAB IAB Ectopic Multiple Live Births   0 0 0 0 1      # Outcome Date GA Lbr Andrea/2nd Weight Sex Delivery Anes PTL Lv   3 Current            2 Term 21 39w4d  3880 g (8 lb 8.9 oz) M CS-LTranv EPI N REINALDO      Complications: Fetal Intolerance of Labor      Name: LUCIA CORNEJO      Apgar1: 8  Apgar5: 9   1 AB                Medical History:   Past Medical History:   Diagnosis Date    Anxiety     used to see a therapist, previously on lexapro       Surgical History:   Past Surgical History:   Procedure Laterality Date     SECTION  2021    TONSILLECTOMY & ADENOIDECTOMY  2012       Social History:   Social History     Socioeconomic History    Marital status:    Tobacco Use    Smoking status: Never    Smokeless tobacco: Never   Substance and Sexual Activity    Alcohol use: Not Currently    Drug use: Never        Family History:   Family History   Problem Relation Age of Onset    Liver cancer Biological Father     Ovarian cancer Father's Sister     Liver cancer Father's Brother     Breast cancer Neg Hx     Colon cancer Neg Hx        Allergies: Patient has no known allergies.    Prior to Admission medications    Medication Sig Start Date End Date Taking? Authorizing Provider   docosahexaenoic acid (DHA PRENATAL ORAL) Take by mouth.    Cong Shelby MD   prenatal vit-iron fum-folic ac 27 mg iron- 0.8 mg tablet tablet Take 1 tablet by mouth daily.    Cong Shelby MD       Review of Systems  Pertinent items are noted in HPI.    Objective     Vital Signs for the last 24 hours:  BP: (104)/(64) 104/64        Labs:  ABO    Date Value Ref Range Status   2021 O  Final     Abo Group   Date Value Ref Range Status   2022 O  Final     Rh Factor   Date Value Ref Range Status   2021 Positive  Final     Rh Type   Date Value Ref Range Status   2022 RH(D) POSITIVE  Final     Comment:        For additional information, please refer to   [email or web address]   (This link is being provided for informational/  educational purposes only.)       Rubella Antibody (Igg)   Date Value Ref Range Status   2022 4.36 Index Final     Comment:         Index            Interpretation      -----            --------------        <0.90            Not consistent with immunity      0.90-0.99        Equivocal      > or = 1.00      Consistent with immunity      The presence of rubella IgG antibody suggests   immunization or past or current infection with  rubella virus.       Group B Streptococcus   Date Value Ref Range Status   2022 NOT DETECTED NOT DETECTED Final     Comment:        This assay was performed by [employer] GeneXpert(R) PCR.     Note per CDC guidelines optimal recovery is achieved by  swabbing both the lower vagina and rectum (through the  anal sphincter).         Assessment/Plan     Rosamaria Sherman is a 31 y.o. female  at 38w1d admitted for      NST upon admission  Ancef to OR  T&C/ CBC/ COVID/ RPR/ Hep B collected  NPO  Consents signed in office     Jesusita Ni MD

## 2022-11-23 NOTE — H&P (VIEW-ONLY)
HPI     Rosamaria Sherman is a 31 y.o. female  at 39 0/7 with an estimated due date of 2022, by Last Menstrual Period who presents for RCS    Pregnancy complicated by:  History of CS due to NRFHT at 6 cm- complicated by uterine atony and PPH.        Anterior placenta      OB History:   OB History    Para Term  AB Living   3 1 1 0 1 1   SAB IAB Ectopic Multiple Live Births   0 0 0 0 1      # Outcome Date GA Lbr Andrea/2nd Weight Sex Delivery Anes PTL Lv   3 Current            2 Term 21 39w4d  3880 g (8 lb 8.9 oz) M CS-LTranv EPI N REINALDO      Complications: Fetal Intolerance of Labor      Name: LUCIA CORNEJO      Apgar1: 8  Apgar5: 9   1 AB                Medical History:   Past Medical History:   Diagnosis Date   • Anxiety     used to see a therapist, previously on lexapro       Surgical History:   Past Surgical History:   Procedure Laterality Date   •  SECTION  2021   • TONSILLECTOMY & ADENOIDECTOMY         Social History:   Social History     Socioeconomic History   • Marital status:    Tobacco Use   • Smoking status: Never   • Smokeless tobacco: Never   Substance and Sexual Activity   • Alcohol use: Not Currently   • Drug use: Never        Family History:   Family History   Problem Relation Age of Onset   • Liver cancer Biological Father    • Ovarian cancer Father's Sister    • Liver cancer Father's Brother    • Breast cancer Neg Hx    • Colon cancer Neg Hx        Allergies: Patient has no known allergies.    Prior to Admission medications    Medication Sig Start Date End Date Taking? Authorizing Provider   docosahexaenoic acid (DHA PRENATAL ORAL) Take by mouth.    ProviderCong MD   prenatal vit-iron fum-folic ac 27 mg iron- 0.8 mg tablet tablet Take 1 tablet by mouth daily.    ProviderCong MD       Review of Systems  Pertinent items are noted in HPI.    Objective     Vital Signs for the last 24 hours:  BP: (104)/(64) 104/64        Labs:  ABO    Date Value Ref Range Status   2021 O  Final     Abo Group   Date Value Ref Range Status   2022 O  Final     Rh Factor   Date Value Ref Range Status   2021 Positive  Final     Rh Type   Date Value Ref Range Status   2022 RH(D) POSITIVE  Final     Comment:        For additional information, please refer to   http://[employer]/faq/WEA546   (This link is being provided for informational/  educational purposes only.)       Rubella Antibody (Igg)   Date Value Ref Range Status   2022 4.36 Index Final     Comment:         Index            Interpretation      -----            --------------        <0.90            Not consistent with immunity      0.90-0.99        Equivocal      > or = 1.00      Consistent with immunity      The presence of rubella IgG antibody suggests   immunization or past or current infection with  rubella virus.       Group B Streptococcus   Date Value Ref Range Status   2022 NOT DETECTED NOT DETECTED Final     Comment:        This assay was performed by [employer] GeneXpert(R) PCR.     Note per CDC guidelines optimal recovery is achieved by  swabbing both the lower vagina and rectum (through the  anal sphincter).         Assessment/Plan     Rosamaria Sherman is a 31 y.o. female  at 38w1d admitted for      NST upon admission  Ancef to OR  T&C/ CBC/ COVID/ RPR/ Hep B collected  NPO  Consents signed in office     Jesusita Ni MD

## 2022-11-28 ENCOUNTER — ANESTHESIA EVENT (INPATIENT)
Dept: OBSTETRICS AND GYNECOLOGY | Facility: HOSPITAL | Age: 32
End: 2022-11-28
Payer: COMMERCIAL

## 2022-11-29 ENCOUNTER — ANESTHESIA (INPATIENT)
Dept: OBSTETRICS AND GYNECOLOGY | Facility: HOSPITAL | Age: 32
End: 2022-11-29
Payer: COMMERCIAL

## 2022-11-29 ENCOUNTER — HOSPITAL ENCOUNTER (INPATIENT)
Facility: HOSPITAL | Age: 32
LOS: 2 days | Discharge: HOME | End: 2022-12-01
Attending: OBSTETRICS & GYNECOLOGY | Admitting: OBSTETRICS & GYNECOLOGY
Payer: COMMERCIAL

## 2022-11-29 PROBLEM — O34.219 HISTORY OF CESAREAN DELIVERY AFFECTING PREGNANCY: Status: ACTIVE | Noted: 2022-11-29

## 2022-11-29 LAB
ABO + RH BLD: NORMAL
BLD GP AB SCN SERPL QL: NEGATIVE
D AG BLD QL: POSITIVE
ERYTHROCYTE [DISTWIDTH] IN BLOOD BY AUTOMATED COUNT: 13.8 % (ref 11.7–14.4)
HBV SURFACE AG SER QL: NONREACTIVE
HCT VFR BLDCO AUTO: 36.2 % (ref 35–45)
HGB BLD-MCNC: 12.1 G/DL (ref 11.8–15.7)
LABORATORY COMMENT REPORT: NORMAL
MCH RBC QN AUTO: 30.9 PG (ref 28–33.2)
MCHC RBC AUTO-ENTMCNC: 33.4 G/DL (ref 32.2–35.5)
MCV RBC AUTO: 92.3 FL (ref 83–98)
PDW BLD AUTO: 10.7 FL (ref 9.4–12.3)
PLATELET # BLD AUTO: 155 K/UL (ref 150–369)
RBC # BLD AUTO: 3.92 M/UL (ref 3.93–5.22)
SARS-COV-2 RNA RESP QL NAA+PROBE: NEGATIVE
SPECIMEN EXP DATE BLD: NORMAL
T PALLIDUM AB SER QL IF: NONREACTIVE
WBC # BLD AUTO: 10.7 K/UL (ref 3.8–10.5)

## 2022-11-29 PROCEDURE — 25000000 HC PHARMACY GENERAL: Performed by: OBSTETRICS & GYNECOLOGY

## 2022-11-29 PROCEDURE — U0003 INFECTIOUS AGENT DETECTION BY NUCLEIC ACID (DNA OR RNA); SEVERE ACUTE RESPIRATORY SYNDROME CORONAVIRUS 2 (SARS-COV-2) (CORONAVIRUS DISEASE [COVID-19]), AMPLIFIED PROBE TECHNIQUE, MAKING USE OF HIGH THROUGHPUT TECHNOLOGIES AS DESCRIBED BY CMS-2020-01-R: HCPCS | Performed by: OBSTETRICS & GYNECOLOGY

## 2022-11-29 PROCEDURE — 86780 TREPONEMA PALLIDUM: CPT | Performed by: OBSTETRICS & GYNECOLOGY

## 2022-11-29 PROCEDURE — 63600000 HC DRUGS/DETAIL CODE: Performed by: STUDENT IN AN ORGANIZED HEALTH CARE EDUCATION/TRAINING PROGRAM

## 2022-11-29 PROCEDURE — 25800000 HC PHARMACY IV SOLUTIONS: Performed by: STUDENT IN AN ORGANIZED HEALTH CARE EDUCATION/TRAINING PROGRAM

## 2022-11-29 PROCEDURE — 72000021 HC C SECTION LEVEL 1: Performed by: OBSTETRICS & GYNECOLOGY

## 2022-11-29 PROCEDURE — 87340 HEPATITIS B SURFACE AG IA: CPT | Performed by: OBSTETRICS & GYNECOLOGY

## 2022-11-29 PROCEDURE — 59025 FETAL NON-STRESS TEST: CPT

## 2022-11-29 PROCEDURE — 36415 COLL VENOUS BLD VENIPUNCTURE: CPT | Performed by: OBSTETRICS & GYNECOLOGY

## 2022-11-29 PROCEDURE — 37000010 HC ANESTHESIA SPINAL: Performed by: OBSTETRICS & GYNECOLOGY

## 2022-11-29 PROCEDURE — 12000000 HC ROOM AND CARE MED/SURG

## 2022-11-29 PROCEDURE — 63600000 HC DRUGS/DETAIL CODE: Performed by: OBSTETRICS & GYNECOLOGY

## 2022-11-29 PROCEDURE — 85027 COMPLETE CBC AUTOMATED: CPT | Performed by: OBSTETRICS & GYNECOLOGY

## 2022-11-29 PROCEDURE — 63700000 HC SELF-ADMINISTRABLE DRUG: Performed by: STUDENT IN AN ORGANIZED HEALTH CARE EDUCATION/TRAINING PROGRAM

## 2022-11-29 PROCEDURE — 59510 CESAREAN DELIVERY: CPT | Performed by: OBSTETRICS & GYNECOLOGY

## 2022-11-29 PROCEDURE — 63700000 HC SELF-ADMINISTRABLE DRUG: Performed by: OBSTETRICS & GYNECOLOGY

## 2022-11-29 PROCEDURE — 25800000 HC PHARMACY IV SOLUTIONS: Performed by: OBSTETRICS & GYNECOLOGY

## 2022-11-29 PROCEDURE — 25000000 HC PHARMACY GENERAL: Performed by: STUDENT IN AN ORGANIZED HEALTH CARE EDUCATION/TRAINING PROGRAM

## 2022-11-29 PROCEDURE — 71000011 HC PACU PHASE 1 EA ADDL MIN: Performed by: OBSTETRICS & GYNECOLOGY

## 2022-11-29 PROCEDURE — 86900 BLOOD TYPING SEROLOGIC ABO: CPT

## 2022-11-29 PROCEDURE — 71000001 HC PACU PHASE 1 INITIAL 30MIN: Performed by: OBSTETRICS & GYNECOLOGY

## 2022-11-29 RX ORDER — HYDROMORPHONE HYDROCHLORIDE 1 MG/ML
0.5 INJECTION, SOLUTION INTRAMUSCULAR; INTRAVENOUS; SUBCUTANEOUS
Status: DISCONTINUED | OUTPATIENT
Start: 2022-11-29 | End: 2022-12-01 | Stop reason: HOSPADM

## 2022-11-29 RX ORDER — SODIUM CITRATE AND CITRIC ACID MONOHYDRATE 334; 500 MG/5ML; MG/5ML
30 SOLUTION ORAL ONCE
Status: COMPLETED | OUTPATIENT
Start: 2022-11-29 | End: 2022-11-29

## 2022-11-29 RX ORDER — MISOPROSTOL 100 UG/1
1000 TABLET ORAL ONCE AS NEEDED
Status: DISCONTINUED | OUTPATIENT
Start: 2022-11-29 | End: 2022-12-01 | Stop reason: HOSPADM

## 2022-11-29 RX ORDER — CALCIUM CARBONATE 200(500)MG
500 TABLET,CHEWABLE ORAL EVERY 4 HOURS PRN
Status: DISCONTINUED | OUTPATIENT
Start: 2022-11-29 | End: 2022-12-01 | Stop reason: HOSPADM

## 2022-11-29 RX ORDER — OXYTOCIN/0.9 % SODIUM CHLORIDE 40/1000ML
500 PLASTIC BAG, INJECTION (ML) INTRAVENOUS ONCE
Status: DISPENSED | OUTPATIENT
Start: 2022-11-29 | End: 2022-11-30

## 2022-11-29 RX ORDER — DEXTROSE 50 % IN WATER (D50W) INTRAVENOUS SYRINGE
25 AS NEEDED
Status: CANCELLED | OUTPATIENT
Start: 2022-11-29

## 2022-11-29 RX ORDER — DEXTROSE 40 %
15-30 GEL (GRAM) ORAL AS NEEDED
Status: CANCELLED | OUTPATIENT
Start: 2022-11-29

## 2022-11-29 RX ORDER — OXYTOCIN/0.9 % SODIUM CHLORIDE 40/1000ML
PLASTIC BAG, INJECTION (ML) INTRAVENOUS CONTINUOUS
Status: ACTIVE | OUTPATIENT
Start: 2022-11-29 | End: 2022-11-29

## 2022-11-29 RX ORDER — SODIUM CHLORIDE, SODIUM LACTATE, POTASSIUM CHLORIDE, CALCIUM CHLORIDE 600; 310; 30; 20 MG/100ML; MG/100ML; MG/100ML; MG/100ML
150 INJECTION, SOLUTION INTRAVENOUS CONTINUOUS
Status: DISCONTINUED | OUTPATIENT
Start: 2022-11-29 | End: 2022-12-01 | Stop reason: HOSPADM

## 2022-11-29 RX ORDER — ACETAMINOPHEN 650 MG/20.3ML
1000 SUSPENSION ORAL ONCE
Status: COMPLETED | OUTPATIENT
Start: 2022-11-29 | End: 2022-11-29

## 2022-11-29 RX ORDER — NALOXONE HYDROCHLORIDE 0.4 MG/ML
0.4 INJECTION, SOLUTION INTRAMUSCULAR; INTRAVENOUS; SUBCUTANEOUS AS NEEDED
Status: DISCONTINUED | OUTPATIENT
Start: 2022-11-29 | End: 2022-11-29

## 2022-11-29 RX ORDER — OXYTOCIN 10 [USP'U]/ML
10 INJECTION, SOLUTION INTRAMUSCULAR; INTRAVENOUS ONCE AS NEEDED
Status: DISCONTINUED | OUTPATIENT
Start: 2022-11-29 | End: 2022-12-01 | Stop reason: HOSPADM

## 2022-11-29 RX ORDER — SODIUM CHLORIDE, SODIUM GLUCONATE, SODIUM ACETATE, POTASSIUM CHLORIDE AND MAGNESIUM CHLORIDE 30; 37; 368; 526; 502 MG/100ML; MG/100ML; MG/100ML; MG/100ML; MG/100ML
INJECTION, SOLUTION INTRAVENOUS CONTINUOUS PRN
Status: DISCONTINUED | OUTPATIENT
Start: 2022-11-29 | End: 2022-11-29 | Stop reason: SURG

## 2022-11-29 RX ORDER — NALOXONE HYDROCHLORIDE 0.4 MG/ML
0.1 INJECTION, SOLUTION INTRAMUSCULAR; INTRAVENOUS; SUBCUTANEOUS
Status: DISCONTINUED | OUTPATIENT
Start: 2022-11-29 | End: 2022-11-29

## 2022-11-29 RX ORDER — KETOROLAC TROMETHAMINE 30 MG/ML
30 INJECTION, SOLUTION INTRAMUSCULAR; INTRAVENOUS
Status: DISCONTINUED | OUTPATIENT
Start: 2022-11-29 | End: 2022-12-01 | Stop reason: HOSPADM

## 2022-11-29 RX ORDER — ONDANSETRON HYDROCHLORIDE 2 MG/ML
4 INJECTION, SOLUTION INTRAVENOUS EVERY 6 HOURS PRN
Status: DISCONTINUED | OUTPATIENT
Start: 2022-11-29 | End: 2022-11-29

## 2022-11-29 RX ORDER — ONDANSETRON 4 MG/1
4 TABLET, ORALLY DISINTEGRATING ORAL EVERY 8 HOURS PRN
Status: DISCONTINUED | OUTPATIENT
Start: 2022-11-29 | End: 2022-12-01 | Stop reason: HOSPADM

## 2022-11-29 RX ORDER — AMOXICILLIN 250 MG
1 CAPSULE ORAL 2 TIMES DAILY
Status: DISCONTINUED | OUTPATIENT
Start: 2022-11-29 | End: 2022-12-01 | Stop reason: HOSPADM

## 2022-11-29 RX ORDER — SODIUM CHLORIDE, SODIUM LACTATE, POTASSIUM CHLORIDE, CALCIUM CHLORIDE 600; 310; 30; 20 MG/100ML; MG/100ML; MG/100ML; MG/100ML
80 INJECTION, SOLUTION INTRAVENOUS CONTINUOUS
Status: ACTIVE | OUTPATIENT
Start: 2022-11-29 | End: 2022-11-30

## 2022-11-29 RX ORDER — ACETAMINOPHEN 325 MG/1
975 TABLET ORAL
Status: DISCONTINUED | OUTPATIENT
Start: 2022-11-29 | End: 2022-12-01 | Stop reason: HOSPADM

## 2022-11-29 RX ORDER — IBUPROFEN 200 MG
16-32 TABLET ORAL AS NEEDED
Status: CANCELLED | OUTPATIENT
Start: 2022-11-29

## 2022-11-29 RX ORDER — DIPHENHYDRAMINE HCL 25 MG
25 CAPSULE ORAL EVERY 6 HOURS PRN
Status: DISCONTINUED | OUTPATIENT
Start: 2022-11-29 | End: 2022-12-01 | Stop reason: HOSPADM

## 2022-11-29 RX ORDER — DIPHENHYDRAMINE HCL 50 MG/ML
12.5 VIAL (ML) INJECTION EVERY 6 HOURS PRN
Status: DISCONTINUED | OUTPATIENT
Start: 2022-11-29 | End: 2022-11-29

## 2022-11-29 RX ORDER — HYDROMORPHONE HYDROCHLORIDE 1 MG/ML
0.5 INJECTION, SOLUTION INTRAMUSCULAR; INTRAVENOUS; SUBCUTANEOUS
Status: CANCELLED | OUTPATIENT
Start: 2022-11-29

## 2022-11-29 RX ORDER — ONDANSETRON 8 MG/1
8 TABLET, ORALLY DISINTEGRATING ORAL ONCE
Status: COMPLETED | OUTPATIENT
Start: 2022-11-29 | End: 2022-11-29

## 2022-11-29 RX ORDER — IBUPROFEN 600 MG/1
600 TABLET ORAL
Status: DISCONTINUED | OUTPATIENT
Start: 2022-12-01 | End: 2022-12-01 | Stop reason: HOSPADM

## 2022-11-29 RX ORDER — DIPHENHYDRAMINE HCL 50 MG/ML
25 VIAL (ML) INJECTION EVERY 6 HOURS PRN
Status: DISCONTINUED | OUTPATIENT
Start: 2022-11-29 | End: 2022-12-01 | Stop reason: HOSPADM

## 2022-11-29 RX ORDER — FENTANYL CITRATE 50 UG/ML
25 INJECTION, SOLUTION INTRAMUSCULAR; INTRAVENOUS EVERY 5 MIN PRN
Status: CANCELLED | OUTPATIENT
Start: 2022-11-29

## 2022-11-29 RX ORDER — MORPHINE SULFATE 0.5 MG/ML
INJECTION, SOLUTION EPIDURAL; INTRATHECAL; INTRAVENOUS
Status: COMPLETED | OUTPATIENT
Start: 2022-11-29 | End: 2022-11-29

## 2022-11-29 RX ORDER — BUPIVACAINE HYDROCHLORIDE 7.5 MG/ML
INJECTION, SOLUTION INTRASPINAL
Status: COMPLETED | OUTPATIENT
Start: 2022-11-29 | End: 2022-11-29

## 2022-11-29 RX ORDER — ONDANSETRON HYDROCHLORIDE 2 MG/ML
4 INJECTION, SOLUTION INTRAVENOUS EVERY 8 HOURS PRN
Status: DISCONTINUED | OUTPATIENT
Start: 2022-11-29 | End: 2022-12-01 | Stop reason: HOSPADM

## 2022-11-29 RX ORDER — METHYLERGONOVINE MALEATE 0.2 MG/ML
200 INJECTION INTRAVENOUS ONCE AS NEEDED
Status: DISCONTINUED | OUTPATIENT
Start: 2022-11-29 | End: 2022-12-01 | Stop reason: HOSPADM

## 2022-11-29 RX ORDER — PROCHLORPERAZINE EDISYLATE 5 MG/ML
10 INJECTION INTRAMUSCULAR; INTRAVENOUS EVERY 6 HOURS PRN
Status: DISCONTINUED | OUTPATIENT
Start: 2022-11-29 | End: 2022-11-29

## 2022-11-29 RX ORDER — CARBOPROST TROMETHAMINE 250 UG/ML
250 INJECTION, SOLUTION INTRAMUSCULAR ONCE AS NEEDED
Status: DISCONTINUED | OUTPATIENT
Start: 2022-11-29 | End: 2022-12-01 | Stop reason: HOSPADM

## 2022-11-29 RX ORDER — OXYCODONE HYDROCHLORIDE 5 MG/1
5 TABLET ORAL EVERY 4 HOURS PRN
Status: DISCONTINUED | OUTPATIENT
Start: 2022-11-29 | End: 2022-12-01 | Stop reason: HOSPADM

## 2022-11-29 RX ORDER — ACETAMINOPHEN 325 MG/1
975 TABLET ORAL ONCE
Status: COMPLETED | OUTPATIENT
Start: 2022-11-29 | End: 2022-11-29

## 2022-11-29 RX ORDER — ACETAMINOPHEN 650 MG/1
650 SUPPOSITORY RECTAL ONCE
Status: COMPLETED | OUTPATIENT
Start: 2022-11-29 | End: 2022-11-29

## 2022-11-29 RX ORDER — OXYTOCIN/0.9 % SODIUM CHLORIDE 40/1000ML
PLASTIC BAG, INJECTION (ML) INTRAVENOUS AS NEEDED
Status: DISCONTINUED | OUTPATIENT
Start: 2022-11-29 | End: 2022-11-29 | Stop reason: SURG

## 2022-11-29 RX ORDER — ALUMINUM HYDROXIDE, MAGNESIUM HYDROXIDE, AND SIMETHICONE 1200; 120; 1200 MG/30ML; MG/30ML; MG/30ML
30 SUSPENSION ORAL EVERY 4 HOURS PRN
Status: DISCONTINUED | OUTPATIENT
Start: 2022-11-29 | End: 2022-12-01 | Stop reason: HOSPADM

## 2022-11-29 RX ORDER — TRANEXAMIC ACID 10 MG/ML
1000 INJECTION, SOLUTION INTRAVENOUS ONCE AS NEEDED
Status: DISCONTINUED | OUTPATIENT
Start: 2022-11-29 | End: 2022-12-01 | Stop reason: HOSPADM

## 2022-11-29 RX ADMIN — MORPHINE SULFATE 0.2 MG: 0.5 INJECTION, SOLUTION EPIDURAL; INTRATHECAL; INTRAVENOUS at 14:27

## 2022-11-29 RX ADMIN — SODIUM CHLORIDE, SODIUM GLUCONATE, SODIUM ACETATE, POTASSIUM CHLORIDE AND MAGNESIUM CHLORIDE: 526; 502; 368; 37; 30 INJECTION, SOLUTION INTRAVENOUS at 14:25

## 2022-11-29 RX ADMIN — Medication 500 ML: at 14:45

## 2022-11-29 RX ADMIN — CEFAZOLIN 2 G: 2 INJECTION, POWDER, FOR SOLUTION INTRAMUSCULAR; INTRAVENOUS at 14:05

## 2022-11-29 RX ADMIN — ACETAMINOPHEN 975 MG: 325 TABLET ORAL at 18:46

## 2022-11-29 RX ADMIN — Medication: at 18:46

## 2022-11-29 RX ADMIN — BUPIVACAINE HYDROCHLORIDE IN DEXTROSE 1.8 ML: 7.5 INJECTION, SOLUTION SUBARACHNOID at 14:27

## 2022-11-29 RX ADMIN — Medication 125 ML/HR: at 15:16

## 2022-11-29 RX ADMIN — KETOROLAC TROMETHAMINE 30 MG: 30 INJECTION, SOLUTION INTRAMUSCULAR; INTRAVENOUS at 22:03

## 2022-11-29 RX ADMIN — KETOROLAC TROMETHAMINE 30 MG: 30 INJECTION, SOLUTION INTRAMUSCULAR; INTRAVENOUS at 16:07

## 2022-11-29 RX ADMIN — PHENYLEPHRINE HYDROCHLORIDE 75 MCG/MIN: 10 INJECTION INTRAVENOUS at 14:34

## 2022-11-29 RX ADMIN — ACETAMINOPHEN 975 MG: 325 TABLET ORAL at 11:52

## 2022-11-29 RX ADMIN — SODIUM CHLORIDE, POTASSIUM CHLORIDE, SODIUM LACTATE AND CALCIUM CHLORIDE 150 ML/HR: 600; 310; 30; 20 INJECTION, SOLUTION INTRAVENOUS at 11:24

## 2022-11-29 RX ADMIN — SODIUM CITRATE AND CITRIC ACID MONOHYDRATE 30 ML: 500; 334 SOLUTION ORAL at 11:53

## 2022-11-29 RX ADMIN — ONDANSETRON 8 MG: 8 TABLET, ORALLY DISINTEGRATING ORAL at 11:54

## 2022-11-29 RX ADMIN — SENNOSIDES AND DOCUSATE SODIUM 1 TABLET: 50; 8.6 TABLET ORAL at 19:53

## 2022-11-29 ASSESSMENT — ENCOUNTER SYMPTOMS: DEPRESSION: 1

## 2022-11-29 NOTE — ANESTHESIA PROCEDURE NOTES
Spinal Block    Patient location during procedure: OR  Start time: 11/29/2022 2:27 PM  Staffing  Performed: anesthesiologist   Anesthesiologist: Ladan Saenz MD  Reason for block: at surgeon's request  Preanesthetic Checklist  Completed: patient identified, surgical consent, pre-op evaluation, timeout performed, IV checked, risks and benefits discussed, monitors and equipment checked and sterile field maintained during procedure  Spinal Block  Patient position: sitting  Prep: ChloraPrep and site prepped and draped  Patient monitoring: heart rate, cardiac monitor, continuous pulse ox and blood pressure  Approach: midline  Location: L3-4  Injection technique: single-shot  Needle  Needle type: Sprotte   Needle gauge: 24 G  Needle length: 3.5 in  Assessment  Events: cerebrospinal fluid  Additional Notes  Procedure well tolerated. Vital signs stable.    Medications Administered -   bupivacaine PF (MARCAINE SPINAL) 0.75% intrathecal injection - intrathecal   1.8 mL - 11/29/2022 2:27:00 PM  morphine PF 0.5 mg/mL - intrathecal   0.2 mg - 11/29/2022 2:27:00 PM

## 2022-11-29 NOTE — PLAN OF CARE
Problem: Adult Inpatient Plan of Care  Goal: Plan of Care Review  Outcome: Progressing  Goal: Patient-Specific Goal (Individualized)  Outcome: Progressing  Goal: Absence of Hospital-Acquired Illness or Injury  Outcome: Progressing  Goal: Optimal Comfort and Wellbeing  Outcome: Progressing  Goal: Readiness for Transition of Care  Outcome: Progressing     Problem: Bleeding ( Delivery)  Goal: Bleeding is Controlled  Outcome: Progressing     Problem: Change in Fetal Wellbeing ( Delivery)  Goal: Stable Fetal Wellbeing  Outcome: Progressing     Problem: Infection ( Delivery)  Goal: Absence of Infection Signs and Symptoms  Outcome: Progressing     Problem: Respiratory Compromise ( Delivery)  Goal: Effective Oxygenation and Ventilation  Outcome: Progressing

## 2022-11-29 NOTE — PROGRESS NOTES
R2OB     Called to patient's bedside due to passage of additional clots. Patient reports feeling mildly dizzy secondary to anesthesia. Denies palpitations, CP, and SOB    Temp:  [36.3 °C (97.4 °F)-36.4 °C (97.6 °F)] 36.3 °C (97.4 °F)  Heart Rate:  [69-98] 69  Resp:  [18] 18  BP: ()/(51-67) 105/52  SpO2:  [87 %-99 %] 98 %      Patient is a POD#0 RCS backfill of the bladder  - her EBL was 700 with a total QBL of 250 + 81 + 100= 1131 cc  - Her fundus is extremely firm on palpation with no additional bleeding noted from the vagina. I suspect the clots were secondary to a lochia block.   - vitals are hemodynamically stable, UOP since surgery 100 + 75 cc/  1.5 hours  (~117 cc/hr)    Will continue to monitor bleeding with pad and fundal checks and vital signs    Discussed with Dr. Beth Kennedy MD

## 2022-11-29 NOTE — ANESTHESIA PREPROCEDURE EVALUATION
Anesthesia ROS/MED HX    Anesthesia History - neg  Pulmonary - neg  Neuro/Psych    Depression  Cardiovascular- neg  Hematological - neg  GI/Hepatic- neg  Musculoskeletal- neg  Renal Disease- neg  Endo/Other- neg      Relevant Problems   Other   (+) HPV (human papilloma virus) infection       Physical Exam    Airway   Mallampati: II   TM distance: <3 FB   Neck ROM: full  Cardiovascular - normal   Rhythm: regular   Rate: normalPulmonary - normal   clear to auscultation  Other Findings   Back - neg   landmarks identified          Anesthesia Plan    Plan: spinal    Technique: spinal     Lines and Monitors: additional IV and PIV     Airway: natural airway / supplemental oxygen   ASA 2  Blood Products:     Use of Blood Products Discussed: Yes     Consented to blood products  Anesthetic plan and risks discussed with: patient  Postop Plan:   Patient Disposition: inpatient floor planned admission  Comments:    Plan:  31 y.o. female  at 39 for repeat      Hx  2021 for NRFHT c/b uterine atony and PPH

## 2022-11-29 NOTE — OR SURGEON
Pre-Procedure patient identification:  I am the primary operating surgeon/proceduralist and I have identified the patient on 11/29/22 at 1:54 PM Jesusita Ni MD  Phone Number: [phone]

## 2022-11-29 NOTE — ANESTHESIOLOGIST PRE-PROCEDURE ATTESTATION
Pre-Procedure Patient Identification:  I am the Primary Anesthesiologist and have identified the patient on 11/29/22 at 4:57 PM.   I have confirmed the procedure(s) will be performed by the following surgeon/proceduralist Jesusita Ni MD.

## 2022-11-29 NOTE — PROGRESS NOTES
"SAM note:    S: Patient presenting for elective RCS. Denies ctx, lof, vb. Reports fetal movement.    Pregnancy c/b:  1) h/o primary FTC/S 2/2 NRFT c/b PPH (uterine atony): was given methergine, hemabate x2 1000mcg rectal cytotec and a B dobson suture.     Pt has a h/o   1) anxiety: no medications. Currently does not have a therapist. Denies SI/SA/HI. Reports mood is stable    O:   Visit Vitals  Temp 36.4 °C (97.6 °F) (Oral)   Ht 1.575 m (5' 2\")   Wt 72.4 kg (159 lb 9.6 oz)   LMP 2022 (Exact Date)   BMI 29.19 kg/m²     Exam:  General Appearance: Alert, cooperative, no acute distress  Lungs: Clear to auscultation bilaterally, respirations unlabored  Heart: Regular rate and rhythm  Abdomen: soft, gravid, nontender  Extremities: no edema or calf tenderness  Neurologic: grossly intact without focal deficits    PTC US 11/10/22:    Est. FW:    3053   gm    6 lb 12 oz      59   %  Placenta anterior     US: cephalic, anterior placenta    FHT: 135/mod/+accels/-decels  [place]: irregular     Prenatal Labs:   · Blood type: O+   · RPR: negative   · Syphilis Antibody: unknown   · HepBsAg: negative   · Rubella: immune   · Rubeola: unknown   · Varicella: immune    · HIV: negative  · Glucose tolerance testing: normal  · Group B Strep: negative  · Gonorrhea: negative  · Chlamydia: negative    A/P: 32 y.o.  @ 39w0d, RCS  1) FHT reactive  2) GBS-: membranes intact  3) CS: admit to L&D, IV access, routine labs   - Ancef to OR   - anesthesia consult   - ERAS protocol pre-op meds   - consent signed and placed  on chart  4) anxiety: mood stable, consider SW consult PP   5) BMI 29: lovenox not indicated pp. SCD's while in bed, encourage early ambulation    SAM Watson    "

## 2022-11-29 NOTE — ANESTHESIA POSTPROCEDURE EVALUATION
Patient: Rosamaria Sherman    Procedure Summary     Date: 22 Room / Location: [facility] / [facility] OR    Anesthesia Start: 1425 Anesthesia Stop: 1534    Procedure:  SECTION (Abdomen) Diagnosis: (Repeat C/S)    Surgeons: Jesusita Ni MD Responsible Provider: Francois Nunez MD    Anesthesia Type: spinal ASA Status: 2          Anesthesia Type: spinal  PACU Vitals  2022 1529 - 2022 1629      2022  1530 2022  1532 2022  1533 2022  1534    BP: -- -- -- 94/51    Temp: 36.3 °C (97.4 °F) -- -- --    Pulse: -- -- 82 83    Resp: 18 -- -- --    SpO2: -- 96 % -- --              2022  1537 2022  1539 2022  1543 2022  1548    BP: -- -- -- --    Temp: -- -- -- --    Pulse: 85 90 89 98    Resp: -- -- -- --    SpO2: 96 % 94 % 96 % 95 %              2022  1550 2022  1552 2022  1557 2022  1558    BP: 109/57 -- -- --    Temp: -- -- -- --    Pulse: 87 88 91 --    Resp: -- -- -- --    SpO2: 94 % 96 % -- 95 %              2022  1601 2022  1603 2022  1605 2022  1608    BP: -- -- 100/55 --    Temp: -- -- -- --    Pulse: 87 91 87 80    Resp: -- -- -- --    SpO2: 94 % 96 % -- 95 %              2022  1612 2022  1618 2022  1620 2022  1623    BP: -- -- 102/66 --    Temp: -- -- -- --    Pulse: 85 94 80 97    Resp: -- -- -- --    SpO2: 97 % 95 % 92 % 97 %              2022  1625 2022          BP: -- --      Temp: -- --      Pulse: 85 84      Resp: -- --      SpO2: 93 % 96 %              Anesthesia Post Evaluation    Pain management: adequate  Patient location during evaluation: floor  Patient participation: complete - patient participated  Level of consciousness: awake and alert  Cardiovascular status: acceptable  Airway Patency: adequate  Respiratory status: acceptable  Hydration status: acceptable  Anesthetic complications: no

## 2022-11-29 NOTE — PLAN OF CARE
Problem: Adult Inpatient Plan of Care  Goal: Plan of Care Review  Outcome: Progressing  Goal: Patient-Specific Goal (Individualized)  Outcome: Progressing  Goal: Absence of Hospital-Acquired Illness or Injury  Outcome: Progressing  Goal: Optimal Comfort and Wellbeing  Outcome: Progressing  Goal: Readiness for Transition of Care  Outcome: Progressing     Problem: Bleeding ( Delivery)  Goal: Bleeding is Controlled  Outcome: Met     Problem: Change in Fetal Wellbeing ( Delivery)  Goal: Stable Fetal Wellbeing  Outcome: Met     Problem: Infection ( Delivery)  Goal: Absence of Infection Signs and Symptoms  Outcome: Met     Problem: Respiratory Compromise ( Delivery)  Goal: Effective Oxygenation and Ventilation  Outcome: Met

## 2022-11-29 NOTE — OP NOTE
OB  Delivery OP Note    Date of Procedure: 2022  Patient:Rosamaria Sherman  :1990    Procedure:     SECTION  CPT(R) Code:  14697 - LA FULL ROUT OBSTE CARE, DELIV     Review the Delivery Report for details.      Details    Pre-Op Diagnosis: 1. 32 y.o.  Intrauterine pregnancy at 39w0d  2. History of primary  section  3. GBS negative     Post-Op Diagnosis: 1. Same as above  2. Delivery of viable     Indication: Prior Uterine Surgery [6]   Procedure: repeat  , Low Transverse  via low transverse uterine incision and Pfannenstiel skin incision.   Anesthesia: Spinal    Findings: Normal uterus, tubes, and ovaries.   EBL  700 mL   Complications: None     Specimen Information:  * No specimens in log *  Drains: Mckeon draining clear    Staff:  Surgeon(s):  Jesusita Ni MD Kurtz, Deirdre, MD  Anesthesiologist: Francois Nunez MD; Ladan Saenz MD  CRNA: Kylah Mar CRNA   Circulator: Shreyas Landry RN  Physician Assistant: Grazyna Cao PA C  Scrub Person: Litzy Ge  Baby Nurse: Solange Guerrero RN  Other Staff:  JOSIAS ALMANZAR;SHREYAS LANDRY;SOLANGE GUERRERO  Delivery Assist;Delivery Nurse;Nursery Nurse     INFANT INFORMATION  Time of Birth:2:43 PM   Presentation: vertex    Position: occiput anterior   Cord: 3 vessels ,Complications:None;Nuchal    Sex: male   [place] Weight: 3.289 kg (7 lb 4 oz)      1 Minute 5 Minute 10 Minute   Apgar Totals: 8   8            Informed Consent:  An informed consent was obtained.    Procedure Details:  The patient was taken to the operating room where Spinal  anesthesia was placed and found to be adequate. During fetal monitoring after placement of Spinal anesthesia the FHT were noted to be in the 90s and thus the  was performed in an urgent fashion. Antibiotics were given for infection prophylaxis. The patient was prepped and draped in the normal sterile fashion.   A timeout was performed.  A Pfannenstiel skin incision was made with the scalpel. The incision was carried down to the fascia using the scalpel. The fascia was incised and this was extended laterally. The fascia was grasped and the underlying rectus muscle was dissected off.  The rectus muscles were  bluntly. The peritoneum was identified and entered bluntly. The peritoneum was dissected to allow for adequate visualization.    The bladder blade was inserted. A thin lower uterine segment was noted from the prior hysterotomy. Peritoneal adhesions were noted on the left aspect of the uterus. The lower uterine segment was then incised with a low transverse  incision and was extended bluntly. The amniotic sac was ruptured and Clear  fluid noted. The bladder blade was removed and the infant's head was delivered atraumatically using the usual maneuvers. The remainder of the infant was delivered, a spontaneous cry was heard, and the infant appeared to be moving all 4 extremities. The cord clamped and cut, and the baby was handed off to the awaiting clinicians and neonatology staff.  The placenta was removed with gentle downward traction on the umbilical cord and fundal massage. The uterus was not exteriorized due to peritoneal adhesions. The uterus was cleared of all clots and debris and repaired in situ. The hysterotomy was repaired with #1 Vicryl in a running locked fashion. The bladder was noted to be directly inferior to the hysterotomy and thus backfill of the bladder was performed with methylene blue. No extravasation of dye was noted and the bladder was confirmed to not be involved in the hysterotomy. The bladder was then drained. The uterine incision was reinspected and found to be hemostatic. The gutters were inspected and cleared of all debris. 3g of kahlil was placed along the hysterotomy due to serosal oozing. The fascia was then reapproximated with a running suture of #1 Vicryl. The subcutaneous tissue  was closed with 3-0 vicryl rapide. The skin was closed with 4-0 Monocryl.    The patient tolerated the procedure well. The sponge, instrument, and needle counts were correct and the patient was taken to recovery in stable condition.    Dr. Ni was present and scrubbed for the entire procedure.    Yanet Johns MD

## 2022-11-30 LAB
ERYTHROCYTE [DISTWIDTH] IN BLOOD BY AUTOMATED COUNT: 13.8 % (ref 11.7–14.4)
HCT VFR BLDCO AUTO: 28.9 % (ref 35–45)
HGB BLD-MCNC: 9.6 G/DL (ref 11.8–15.7)
MCH RBC QN AUTO: 31.1 PG (ref 28–33.2)
MCHC RBC AUTO-ENTMCNC: 33.2 G/DL (ref 32.2–35.5)
MCV RBC AUTO: 93.5 FL (ref 83–98)
PDW BLD AUTO: 10.8 FL (ref 9.4–12.3)
PLATELET # BLD AUTO: 124 K/UL (ref 150–369)
RBC # BLD AUTO: 3.09 M/UL (ref 3.93–5.22)
WBC # BLD AUTO: 10.24 K/UL (ref 3.8–10.5)

## 2022-11-30 PROCEDURE — 63600000 HC DRUGS/DETAIL CODE: Performed by: STUDENT IN AN ORGANIZED HEALTH CARE EDUCATION/TRAINING PROGRAM

## 2022-11-30 PROCEDURE — 36415 COLL VENOUS BLD VENIPUNCTURE: CPT | Performed by: STUDENT IN AN ORGANIZED HEALTH CARE EDUCATION/TRAINING PROGRAM

## 2022-11-30 PROCEDURE — 63700000 HC SELF-ADMINISTRABLE DRUG: Performed by: STUDENT IN AN ORGANIZED HEALTH CARE EDUCATION/TRAINING PROGRAM

## 2022-11-30 PROCEDURE — 12000000 HC ROOM AND CARE MED/SURG

## 2022-11-30 PROCEDURE — 63700000 HC SELF-ADMINISTRABLE DRUG

## 2022-11-30 PROCEDURE — 85027 COMPLETE CBC AUTOMATED: CPT | Performed by: STUDENT IN AN ORGANIZED HEALTH CARE EDUCATION/TRAINING PROGRAM

## 2022-11-30 RX ORDER — SIMETHICONE 80 MG
80 TABLET,CHEWABLE ORAL 4 TIMES DAILY PRN
Status: DISCONTINUED | OUTPATIENT
Start: 2022-11-30 | End: 2022-12-01 | Stop reason: HOSPADM

## 2022-11-30 RX ADMIN — ACETAMINOPHEN 975 MG: 325 TABLET ORAL at 18:35

## 2022-11-30 RX ADMIN — SIMETHICONE 80 MG: 80 TABLET, CHEWABLE ORAL at 22:06

## 2022-11-30 RX ADMIN — PRENATAL VITAMINS-IRON FUMARATE 27 MG IRON-FOLIC ACID 0.8 MG TABLET 1 TABLET: at 09:11

## 2022-11-30 RX ADMIN — SENNOSIDES AND DOCUSATE SODIUM 1 TABLET: 50; 8.6 TABLET ORAL at 09:11

## 2022-11-30 RX ADMIN — ACETAMINOPHEN 975 MG: 325 TABLET ORAL at 05:57

## 2022-11-30 RX ADMIN — ACETAMINOPHEN 975 MG: 325 TABLET ORAL at 00:32

## 2022-11-30 RX ADMIN — SENNOSIDES AND DOCUSATE SODIUM 1 TABLET: 50; 8.6 TABLET ORAL at 20:49

## 2022-11-30 RX ADMIN — KETOROLAC TROMETHAMINE 30 MG: 30 INJECTION, SOLUTION INTRAMUSCULAR; INTRAVENOUS at 10:27

## 2022-11-30 RX ADMIN — ACETAMINOPHEN 975 MG: 325 TABLET ORAL at 12:30

## 2022-11-30 RX ADMIN — SIMETHICONE 80 MG: 80 TABLET, CHEWABLE ORAL at 15:51

## 2022-11-30 RX ADMIN — KETOROLAC TROMETHAMINE 30 MG: 30 INJECTION, SOLUTION INTRAMUSCULAR; INTRAVENOUS at 15:51

## 2022-11-30 RX ADMIN — ALUMINUM HYDROXIDE, MAGNESIUM HYDROXIDE, AND SIMETHICONE 30 ML: 200; 200; 20 SUSPENSION ORAL at 12:34

## 2022-11-30 RX ADMIN — ACETAMINOPHEN 975 MG: 325 TABLET ORAL at 23:55

## 2022-11-30 RX ADMIN — KETOROLAC TROMETHAMINE 30 MG: 30 INJECTION, SOLUTION INTRAMUSCULAR; INTRAVENOUS at 22:06

## 2022-11-30 RX ADMIN — KETOROLAC TROMETHAMINE 30 MG: 30 INJECTION, SOLUTION INTRAMUSCULAR; INTRAVENOUS at 04:10

## 2022-11-30 NOTE — PLAN OF CARE
Chart reviewed. Met with MOB and FOB at the bedside. Noted anxiety in H&P. Patient states that she is not experiencing any anxiety at this time, FOB confirms patient's mood is good. Offered MOB resources and information on the [facility]. MOB and FOB appreciated resources. Reviewed signs and symptoms of PPD. Social work will remain available for emotional support and DC planning. DULCE León

## 2022-11-30 NOTE — PROGRESS NOTES
Obstetrics  Postpartum Progress Note    Events  Patient seen and examined. No acute events overnight.    Subjective  Denies HA, CP, SOB, N/V and F/C. Pain controlled. No complaints.  Bleeding: lochia minimal  Diet: taking regular diet  Voiding: spain discontinued, awaiting spontaneous void  Bowel: no flatus  Ambulating: with assistance    Vitals  Temp:  [36.3 °C (97.3 °F)-36.7 °C (98 °F)] 36.7 °C (98 °F)  Heart Rate:  [69-98] 90  Resp:  [16-18] 16  BP: ()/(49-80) 95/49    Physical Exam  General: well  Heart: Regular rate and rhythm  Lungs: Clear to auscultation bilaterally  Abdomen: soft, nondistended, non-tender, no rebound/rigidity/guarding. Fundus firm below umbilicus   Incision: dressing clean, dry, intact  Extremities: symmetric and no edema    Labs  Labs Reviewed:    Lab Results   Component Value Date    ABO O 2022    LABRH Positive 2022      Results from last 7 days   Lab Units 22  1124   WBC K/uL 10.70*   RBC M/uL 3.92*   HEMOGLOBIN g/dL 12.1   HEMATOCRIT % 36.2   MCV fL 92.3   MCH pg 30.9   MCHC g/dL 33.4   RDW % 13.8   PLATELETS K/uL 155   MPV fL 10.7     Rubella Antibody (Igg)   Date Value Ref Range Status   2022 4.36 Index Final     Comment:         Index            Interpretation      -----            --------------        <0.90            Not consistent with immunity      0.90-0.99        Equivocal      > or = 1.00      Consistent with immunity      The presence of rubella IgG antibody suggests   immunization or past or current infection with  rubella virus.       Syphilis Ab   Date Value Ref Range Status   2022 Nonreactive Nonreactive Final     Rpr (Dx) W/Refl Titer And Confirmatory Testing   Date Value Ref Range Status   2022 NON-REACTIVE NON-REACTIVE Final     Hepatitis B Surface Ag   Date Value Ref Range Status   2022 Nonreactive Nonreactive Final     Assessment/Plan   Problem-based Assessment and Plan    Rosamaria Sherman is a 32 y.o.   POD#1 s/p RCS, backfill of bladder complicated by PPH    1. Vital Signs: stable  2. Hemodynamics: stable. EBL 1.1L. Hgb 12.1 pre-op--->AM CBC in process. No signs or symptoms of acute anemia.   3. Pain: controlled with ERAS protocol  4. VTE Assessment: Continue SCDs. Encouraged ambulation at least 4 times daily.  5. Vaccinations/Rhogam: Rhogam not indicated  6. Post-op care: meeting appropriate post-op milestones. Continue routine post-op care. Mckeon removed this AM, TOV up at 1155  7. COVID vaccination status: Patient reports she has been vaccinated.   8. Anxiety: no meds. EPDS pending. SW c/s ordered     Bea Forte MD

## 2022-11-30 NOTE — LACTATION NOTE
"This note was copied from a baby's chart.  Day 1. 2nd baby. Mom  1st baby that had tongue-tie. Mom states that no revision was needed for tongue-tie, mom  that infant for 8 months.  This infant has possible tight frenulum.Upon oral assessment, infant unable to move tongue passed gum ridge. Infant chomping on finger, and mom states that nipple was compressed after feeding. Mom also states infant has been difficult to latch on left side.  LC assist with latching infant on left side with good results. Assist with positioning to aid mom in getting deeper latch. Mom denies pain with latch at this time.  Provided breastfeeding education. Reviewed infant feeding expectations, and expected output for first days of life. Educated on diet and fluid intake. Shown breastfeeding chapter of \"Postpartum and [place] [facility]\" book. Provided outpatient resources. Questions answered and support given.    Has personal pump for home.     "

## 2022-11-30 NOTE — PROGRESS NOTES
Patient: Rosamaria Sherman  Procedure(s):   SECTION  Location: [facility] L&D OR    Last vitals:   Vitals:    22 0745   BP: (!) 98/52   Pulse: 86   Resp: 18   Temp: 37.4 °C (99.3 °F)   SpO2: 98%     Level of consciousness: Awake, Alert, Oriented  Post-anesthesia pain: Adequate analgesia  Anesthetic complications: None  Nausea and Vomiting Controled: Yes  Hydration: Adequate  Cardiovascular Function: Stable  Neuro Exam: WNL  Respiration: WNL  Pain is well controlled after spinal preservative free morphine administration and additional IV/PO meds:  Continue 24 hours observation after preservative free morphine administration:

## 2022-11-30 NOTE — PROGRESS NOTES
Post-Op Check    Events  Pt seen/examined. Was previously evaluated by Dr. GINA Kennedy due to postpartum bleeding. Total QBL 1131 mL since delivery.    Subjective  Pt denies: HA, CP, SOB, N/V and F/C.No complaints.  Pain: well controlled  Vaginal Bleeding: moderate  Voiding: Mckeon catheter in place draining clear yellow urine   Diet: taking regular diet  Bowel: no flatus    Vitals  Temp:  [36.3 °C (97.3 °F)-36.4 °C (97.6 °F)] 36.3 °C (97.3 °F)  Heart Rate:  [69-98] 72  Resp:  [18] 18  BP: ()/(51-80) 110/80    I&O    Intake/Output Summary (Last 24 hours) at 2022  Last data filed at 2022 1846  Gross per 24 hour   Intake 1600 ml   Output 1741 ml   Net -141 ml       Urine output since delivery: 300cc/4hrs = 75cc/hr    Physical Exam  General: NAD   Heart: Regular rate and rhythm  Lungs: Clear to auscultation bilaterally  Abdomen: soft, nondistended, non-tender, no rebound/rigidity/guarding. Fundus firm, non-tender, below umbilicus.  Incision: Mepilex dressing clean, dry, intact  Extremities: symmetric and no edema. SCDs on and pumping/      Assessment/Plan   Problem-based Assessment and Plan    Rosamaria Sherman is a 32 y.o. POD#0 s/p RCS c/b PPH.    1. Vital Signs: stable  2. Hemodynamics/PPH: stable. Pre-op hgb 12.1, CBC ordered for the AM. QBL 1131 mL. S/p 3g Trevon. Fundus firm. Will continue to monitor. No signs or symptoms of acute anemia.   3. Pain: controlled. Continue ERAS protocol.  4. VTE Assessment: SCDs. Encouraged ambulation at least 4 times daily.  5. Post-op care: UOP adequate. For TOV tomorrow morning.     Neli Maloney MD

## 2022-12-01 VITALS
HEIGHT: 62 IN | DIASTOLIC BLOOD PRESSURE: 79 MMHG | WEIGHT: 159.6 LBS | OXYGEN SATURATION: 98 % | TEMPERATURE: 97.8 F | SYSTOLIC BLOOD PRESSURE: 118 MMHG | BODY MASS INDEX: 29.37 KG/M2 | RESPIRATION RATE: 18 BRPM | HEART RATE: 91 BPM

## 2022-12-01 PROBLEM — O34.219 HISTORY OF CESAREAN DELIVERY AFFECTING PREGNANCY: Status: RESOLVED | Noted: 2022-11-29 | Resolved: 2022-12-01

## 2022-12-01 PROBLEM — Z34.90 PREGNANCY: Status: RESOLVED | Noted: 2022-06-02 | Resolved: 2022-12-01

## 2022-12-01 PROCEDURE — 63700000 HC SELF-ADMINISTRABLE DRUG: Performed by: STUDENT IN AN ORGANIZED HEALTH CARE EDUCATION/TRAINING PROGRAM

## 2022-12-01 PROCEDURE — 63700000 HC SELF-ADMINISTRABLE DRUG

## 2022-12-01 PROCEDURE — 63600000 HC DRUGS/DETAIL CODE: Performed by: STUDENT IN AN ORGANIZED HEALTH CARE EDUCATION/TRAINING PROGRAM

## 2022-12-01 RX ORDER — AMOXICILLIN 250 MG
1 CAPSULE ORAL 2 TIMES DAILY
Qty: 60 TABLET | Refills: 0 | Status: SHIPPED | OUTPATIENT
Start: 2022-12-01 | End: 2023-08-25 | Stop reason: ALTCHOICE

## 2022-12-01 RX ORDER — IBUPROFEN 600 MG/1
600 TABLET ORAL
Qty: 40 TABLET | Refills: 0 | Status: SHIPPED | OUTPATIENT
Start: 2022-12-01 | End: 2023-08-25 | Stop reason: ALTCHOICE

## 2022-12-01 RX ORDER — ACETAMINOPHEN 325 MG/1
975 TABLET ORAL
Qty: 120 TABLET | Refills: 0 | Status: SHIPPED | OUTPATIENT
Start: 2022-12-01 | End: 2022-12-11

## 2022-12-01 RX ORDER — POLYETHYLENE GLYCOL 3350 17 G/17G
17 POWDER, FOR SOLUTION ORAL DAILY PRN
Status: DISCONTINUED | OUTPATIENT
Start: 2022-12-01 | End: 2022-12-01 | Stop reason: HOSPADM

## 2022-12-01 RX ADMIN — PRENATAL VITAMINS-IRON FUMARATE 27 MG IRON-FOLIC ACID 0.8 MG TABLET 1 TABLET: at 10:08

## 2022-12-01 RX ADMIN — ACETAMINOPHEN 975 MG: 325 TABLET ORAL at 05:58

## 2022-12-01 RX ADMIN — SENNOSIDES AND DOCUSATE SODIUM 1 TABLET: 50; 8.6 TABLET ORAL at 10:08

## 2022-12-01 RX ADMIN — KETOROLAC TROMETHAMINE 30 MG: 30 INJECTION, SOLUTION INTRAMUSCULAR; INTRAVENOUS at 10:08

## 2022-12-01 RX ADMIN — KETOROLAC TROMETHAMINE 30 MG: 30 INJECTION, SOLUTION INTRAMUSCULAR; INTRAVENOUS at 04:05

## 2022-12-01 RX ADMIN — ACETAMINOPHEN 975 MG: 325 TABLET ORAL at 11:58

## 2022-12-01 RX ADMIN — POLYETHYLENE GLYCOL 3350 17 G: 17 POWDER, FOR SOLUTION ORAL at 10:08

## 2022-12-01 NOTE — DISCHARGE SUMMARY
Obstetrical Discharge Form    EDC: Estimated Date of Delivery: 22    Gestational Age:39w0d    Antepartum complications: hx CD    Date of Delivery: 22    Delivered By: Jesusita Ni     Delivery Type: , Low Transverse     Baby: Liveborn male, Apgars 8/8, weight 7 #, 4 oz,     Anesthesia: spinal    Intrapartum complications: backfill of bladder, PPH    Postpartum Complications: none    Laceration: n/a    Episiotomy: none,    Placenta: manual removal    Rh Immune globulin given: not applicable    Rubella vaccine given: not applicable    Discharge Date: 22      Plan:    Follow-up appointment with Paco Reeves 2 and 6 weeks postpartum.    Roxann Marquis DO

## 2022-12-01 NOTE — PROGRESS NOTES
Obstetrics  Postpartum Progress Note    Events  Patient seen and examined. No acute events overnight.    Subjective  Denies HA, CP, SOB, N/V and F/C. Pain controlled. No complaints.  Bleeding: lochia minimal  Diet: taking regular diet  Voiding: without difficulty  Bowel: passing flatus  Ambulating: with assistance    Vitals  Temp:  [36.6 °C (97.8 °F)-37.4 °C (99.3 °F)] 36.8 °C (98.2 °F)  Heart Rate:  [85-98] 89  Resp:  [16-18] 16  BP: ()/(52-66) 101/52    Physical Exam  General: well  Heart: Regular rate and rhythm  Lungs: Clear to auscultation bilaterally  Abdomen: soft, nondistended, non-tender, no rebound/rigidity/guarding. Fundus firm below umbilicus   Incision: Dressing removed. healing well, no significant drainage, no dehiscence and no significant erythema  Extremities: symmetric and no edema    Labs  Labs Reviewed:    Lab Results   Component Value Date    ABO O 2022    LABRH Positive 2022      CBC Results       22     0537 1124 0808    WBC 10.24 10.70 9.1    RBC 3.09 3.92 3.83    HGB 9.6 12.1 11.8    HCT 28.9 36.2 35.2    MCV 93.5 92.3 91.9    MCH 31.1 30.9 30.8    MCHC 33.2 33.4 33.5     155 175         Comment for HGB at 0537 on 22: ALL RESULTS HAVE BEEN CHECKED        Rubella Antibody (Igg)   Date Value Ref Range Status   2022 4.36 Index Final     Comment:         Index            Interpretation      -----            --------------        <0.90            Not consistent with immunity      0.90-0.99        Equivocal      > or = 1.00      Consistent with immunity      The presence of rubella IgG antibody suggests   immunization or past or current infection with  rubella virus.       Syphilis Ab   Date Value Ref Range Status   2022 Nonreactive Nonreactive Final     Rpr (Dx) W/Refl Titer And Confirmatory Testing   Date Value Ref Range Status   2022 NON-REACTIVE NON-REACTIVE Final     Hepatitis B Surface Ag   Date Value Ref Range  Status   2022 Nonreactive Nonreactive Final     Assessment/Plan   Problem-based Assessment and Plan    Rosamaria Sherman is a 32 y.o.  POD#2 s/p RCS, backfill of bladder complicated by PPH    1. Vital Signs: stable  2. Hemodynamics: stable. EBL 1.1L. Hgb 12.1 pre-op-->9.6. No signs or symptoms of acute anemia.   3. Pain: controlled with ERAS protocol  4. VTE Assessment: Continue SCDs. Encouraged ambulation at least 4 times daily.  5. Vaccinations/Rhogam: Rhogam not indicated  6. Post-op care: meeting appropriate post-op milestones. Continue routine post-op care.   7. COVID vaccination status: Patient reports she has been vaccinated.   8. Anxiety: no meds. EPDS pending. S/p ARIANNE Forte MD

## 2022-12-01 NOTE — PLAN OF CARE
Problem: Adult Inpatient Plan of Care  Goal: Plan of Care Review  Outcome: Progressing  Flowsheets (Taken 2022)  Outcome Summary: Pt VSS. Voiding without difficulty. Pain well controlled.  Goal: Patient-Specific Goal (Individualized)  Outcome: Progressing  Goal: Absence of Hospital-Acquired Illness or Injury  Outcome: Progressing  Goal: Optimal Comfort and Wellbeing  Outcome: Progressing  Goal: Readiness for Transition of Care  Outcome: Progressing     Problem: Adjustment to Role Transition (Postpartum  Delivery)  Goal: Successful Maternal Role Transition  Outcome: Progressing     Problem: Bleeding (Postpartum  Delivery)  Goal: Hemostasis  Outcome: Progressing     Problem: Infection (Postpartum  Delivery)  Goal: Absence of Infection Signs and Symptoms  Outcome: Progressing     Problem: Pain (Postpartum  Delivery)  Goal: Acceptable Pain Control  Outcome: Progressing     Problem: Postoperative Nausea and Vomiting (Postpartum  Delivery)  Goal: Nausea and Vomiting Relief  Outcome: Progressing     Problem: Postoperative Urinary Retention (Postpartum  Delivery)  Goal: Effective Urinary Elimination  Outcome: Progressing     Problem: Breastfeeding  Goal: Effective Breastfeeding  Outcome: Progressing   Plan of Care Review  Outcome Summary: Pt VSS. Voiding without difficulty. Pain well controlled.

## 2022-12-03 ENCOUNTER — HOSPITAL ENCOUNTER (EMERGENCY)
Facility: HOSPITAL | Age: 32
Discharge: HOME | End: 2022-12-03
Attending: EMERGENCY MEDICINE
Payer: COMMERCIAL

## 2022-12-03 ENCOUNTER — APPOINTMENT (EMERGENCY)
Dept: RADIOLOGY | Facility: HOSPITAL | Age: 32
End: 2022-12-03
Attending: PHYSICIAN ASSISTANT
Payer: COMMERCIAL

## 2022-12-03 ENCOUNTER — TELEPHONE (OUTPATIENT)
Dept: OBSTETRICS AND GYNECOLOGY | Facility: CLINIC | Age: 32
End: 2022-12-03
Payer: COMMERCIAL

## 2022-12-03 ENCOUNTER — APPOINTMENT (EMERGENCY)
Dept: RADIOLOGY | Facility: HOSPITAL | Age: 32
End: 2022-12-03
Payer: COMMERCIAL

## 2022-12-03 VITALS
OXYGEN SATURATION: 99 % | TEMPERATURE: 98.2 F | HEART RATE: 89 BPM | BODY MASS INDEX: 28.56 KG/M2 | WEIGHT: 155.2 LBS | HEIGHT: 62 IN | RESPIRATION RATE: 16 BRPM | SYSTOLIC BLOOD PRESSURE: 125 MMHG | DIASTOLIC BLOOD PRESSURE: 65 MMHG

## 2022-12-03 DIAGNOSIS — R60.0 PERIPHERAL EDEMA: Primary | ICD-10-CM

## 2022-12-03 LAB
ALBUMIN SERPL-MCNC: 2.9 G/DL (ref 3.4–5)
ALP SERPL-CCNC: 96 IU/L (ref 35–126)
ALT SERPL-CCNC: 32 IU/L (ref 11–54)
ANION GAP SERPL CALC-SCNC: 8 MEQ/L (ref 3–15)
AST SERPL-CCNC: 33 IU/L (ref 15–41)
BACTERIA URNS QL MICRO: ABNORMAL /HPF
BASOPHILS # BLD: 0.02 K/UL (ref 0.01–0.1)
BASOPHILS NFR BLD: 0.2 %
BILIRUB SERPL-MCNC: 0.4 MG/DL (ref 0.3–1.2)
BILIRUB UR QL STRIP.AUTO: NEGATIVE MG/DL
BUN SERPL-MCNC: 7 MG/DL (ref 8–20)
CALCIUM SERPL-MCNC: 8.4 MG/DL (ref 8.9–10.3)
CHLORIDE SERPL-SCNC: 102 MEQ/L (ref 98–109)
CLARITY UR REFRACT.AUTO: CLEAR
CO2 SERPL-SCNC: 24 MEQ/L (ref 22–32)
COLOR UR AUTO: COLORLESS
CREAT SERPL-MCNC: 0.6 MG/DL (ref 0.6–1.1)
DIFFERENTIAL METHOD BLD: ABNORMAL
EOSINOPHIL # BLD: 0.24 K/UL (ref 0.04–0.36)
EOSINOPHIL NFR BLD: 2.7 %
ERYTHROCYTE [DISTWIDTH] IN BLOOD BY AUTOMATED COUNT: 13.7 % (ref 11.7–14.4)
FLUAV RNA SPEC QL NAA+PROBE: NEGATIVE
FLUBV RNA SPEC QL NAA+PROBE: NEGATIVE
GFR SERPL CREATININE-BSD FRML MDRD: >60 ML/MIN/1.73M*2
GLUCOSE SERPL-MCNC: 98 MG/DL (ref 70–99)
GLUCOSE UR STRIP.AUTO-MCNC: NEGATIVE MG/DL
HCT VFR BLDCO AUTO: 29.9 % (ref 35–45)
HGB BLD-MCNC: 10 G/DL (ref 11.8–15.7)
HGB UR QL STRIP.AUTO: 3
HYALINE CASTS #/AREA URNS LPF: ABNORMAL /LPF
IMM GRANULOCYTES # BLD AUTO: 0.14 K/UL (ref 0–0.08)
IMM GRANULOCYTES NFR BLD AUTO: 1.6 %
KETONES UR STRIP.AUTO-MCNC: NEGATIVE MG/DL
LEUKOCYTE ESTERASE UR QL STRIP.AUTO: NEGATIVE
LYMPHOCYTES # BLD: 1.51 K/UL (ref 1.2–3.5)
LYMPHOCYTES NFR BLD: 17.1 %
MCH RBC QN AUTO: 31.3 PG (ref 28–33.2)
MCHC RBC AUTO-ENTMCNC: 33.4 G/DL (ref 32.2–35.5)
MCV RBC AUTO: 93.7 FL (ref 83–98)
MONOCYTES # BLD: 0.36 K/UL (ref 0.28–0.8)
MONOCYTES NFR BLD: 4.1 %
MUCOUS THREADS URNS QL MICRO: ABNORMAL /LPF
NEUTROPHILS # BLD: 6.56 K/UL (ref 1.7–7)
NEUTS SEG NFR BLD: 74.3 %
NITRITE UR QL STRIP.AUTO: NEGATIVE
NRBC BLD-RTO: 0 %
PDW BLD AUTO: 9.9 FL (ref 9.4–12.3)
PH UR STRIP.AUTO: 7 [PH]
PLATELET # BLD AUTO: 240 K/UL (ref 150–369)
POTASSIUM SERPL-SCNC: 3.6 MEQ/L (ref 3.6–5.1)
PROT SERPL-MCNC: 6.2 G/DL (ref 6–8.2)
PROT UR QL STRIP.AUTO: NEGATIVE
RBC # BLD AUTO: 3.19 M/UL (ref 3.93–5.22)
RBC #/AREA URNS HPF: ABNORMAL /HPF
SARS-COV-2 RNA RESP QL NAA+PROBE: NEGATIVE
SODIUM SERPL-SCNC: 134 MEQ/L (ref 136–144)
SP GR UR REFRACT.AUTO: 1.01
SQUAMOUS URNS QL MICRO: 1 /HPF
UROBILINOGEN UR STRIP-ACNC: 0.2 EU/DL
WBC # BLD AUTO: 8.83 K/UL (ref 3.8–10.5)
WBC #/AREA URNS HPF: ABNORMAL /HPF

## 2022-12-03 PROCEDURE — 71045 X-RAY EXAM CHEST 1 VIEW: CPT

## 2022-12-03 PROCEDURE — 87636 SARSCOV2 & INF A&B AMP PRB: CPT | Performed by: PHYSICIAN ASSISTANT

## 2022-12-03 PROCEDURE — 93971 EXTREMITY STUDY: CPT | Mod: LT

## 2022-12-03 PROCEDURE — 81001 URINALYSIS AUTO W/SCOPE: CPT | Performed by: PHYSICIAN ASSISTANT

## 2022-12-03 PROCEDURE — 36415 COLL VENOUS BLD VENIPUNCTURE: CPT | Performed by: PHYSICIAN ASSISTANT

## 2022-12-03 PROCEDURE — 80053 COMPREHEN METABOLIC PANEL: CPT | Performed by: PHYSICIAN ASSISTANT

## 2022-12-03 PROCEDURE — 85025 COMPLETE CBC W/AUTO DIFF WBC: CPT | Performed by: PHYSICIAN ASSISTANT

## 2022-12-03 PROCEDURE — 99284 EMERGENCY DEPT VISIT MOD MDM: CPT | Mod: 25

## 2022-12-03 ASSESSMENT — ENCOUNTER SYMPTOMS
DIARRHEA: 0
FATIGUE: 0
DIZZINESS: 0
BACK PAIN: 0
DYSURIA: 0
ABDOMINAL PAIN: 0
SINUS PAIN: 0
APPETITE CHANGE: 0
WEAKNESS: 0
CHEST TIGHTNESS: 0
TREMORS: 0
DIAPHORESIS: 0
NECK PAIN: 0
SINUS PRESSURE: 0
NECK STIFFNESS: 0
NUMBNESS: 0
LIGHT-HEADEDNESS: 0
PALPITATIONS: 0
DECREASED CONCENTRATION: 0
HEMATURIA: 0
WHEEZING: 0
COLOR CHANGE: 0
RHINORRHEA: 0
SORE THROAT: 0
VOMITING: 0
COUGH: 0
CHILLS: 1
JOINT SWELLING: 0
CONFUSION: 0
NAUSEA: 0
SHORTNESS OF BREATH: 0
HEADACHES: 0
STRIDOR: 0
ARTHRALGIAS: 0
MYALGIAS: 0
EYE PAIN: 0
PHOTOPHOBIA: 0
FREQUENCY: 0
DIFFICULTY URINATING: 0
CONSTIPATION: 0
FEVER: 0
FLANK PAIN: 0

## 2022-12-03 NOTE — TELEPHONE ENCOUNTER
Pt called answering service, spoke with pt. She has not quite blurred vision, but hard to focus her vision. Both ankles are swollen, one significantly more swollen than the other. Breast engorgement. Hasn't taken home BP. Given the swelling, recommend she go to ER to r/o DVT and get a BP check to r/o preeclampsia. Pt agrees to go.

## 2022-12-04 NOTE — DISCHARGE INSTRUCTIONS
Elevate extremities, rest   OTC tylenol /motrin as needed as directed  Follow up with PCP in 1-2 days  Follow up with OBGYN in 1-2 days   Return to the ED for worsening of symptoms or any problems or concerns.  It is very important to follow up with your healthcare provider for re-evaluation.

## 2022-12-04 NOTE — ED PROVIDER NOTES
"Emergency Medicine Note  HPI   HISTORY OF PRESENT ILLNESS     Patient is a pleasant 31 y/o female with PMH anxiety, PPD #4 from a  at [facility] [place] who presents c/o chills, \"feeling off\", LLE swelling x 1 day  States she had a  on 22  States she had been doing well until today  States today \"I was just cold all day\"   States she did not take her temperature, unsure if she had a fever  States alternating tylenol and motrin for post-op pain at home   States \"I just felt off\"   States \"I'm not having blurry vision, it's just like when I look at something it takes me a minute to focus\"   States she took a nap and when she woke the LLE seemed swollen compared to the RLE  States she called her OBGYN who referred her to ED \"for a DVT study and to check her blood pressure\"   She denies fever, sweats, n/v/d, abdominal pain, chest pain, SOB, ZARAGOZA  She denies HA, changes in vision, neck pain  She denies numbness/ tingling/ weakness in extremities  She denies redness/ drainage/ discharge from her incision site  She denies URI or  s/s  She denies any other complaints or concerns at this time             Patient History   PAST HISTORY     Reviewed from Nursing Triage:       Past Medical History:   Diagnosis Date   • Anxiety     used to see a therapist, previously on lexapro       Past Surgical History:   Procedure Laterality Date   •  SECTION  2021   • TONSILLECTOMY & ADENOIDECTOMY  2012       Family History   Problem Relation Age of Onset   • Liver cancer Biological Father    • Ovarian cancer Father's Sister    • Liver cancer Father's Brother    • Breast cancer Neg Hx    • Colon cancer Neg Hx        Social History     Tobacco Use   • Smoking status: Never   • Smokeless tobacco: Never   Substance Use Topics   • Alcohol use: Not Currently   • Drug use: Never         Review of Systems   REVIEW OF SYSTEMS     Review of Systems   Constitutional: Positive for chills. Negative for appetite change, " diaphoresis, fatigue and fever.   HENT: Negative for congestion, ear pain, postnasal drip, rhinorrhea, sinus pressure, sinus pain and sore throat.    Eyes: Negative for photophobia, pain and visual disturbance.   Respiratory: Negative for cough, chest tightness, shortness of breath, wheezing and stridor.    Cardiovascular: Positive for leg swelling. Negative for chest pain and palpitations.   Gastrointestinal: Negative for abdominal pain, constipation, diarrhea, nausea and vomiting.   Genitourinary: Negative for decreased urine volume, difficulty urinating, dysuria, flank pain, frequency, hematuria and urgency.   Musculoskeletal: Negative for arthralgias, back pain, gait problem, joint swelling, myalgias, neck pain and neck stiffness.   Skin: Negative for color change and rash.   Neurological: Negative for dizziness, tremors, syncope, weakness, light-headedness, numbness and headaches.   Psychiatric/Behavioral: Negative for confusion and decreased concentration.         VITALS     ED Vitals    Date/Time Temp Pulse Resp BP SpO2 [facility]   12/03/22 2015 36.8 °C (98.2 °F) -- 16 125/65 99 % AC   12/03/22 1622 36.7 °C (98.1 °F) 89 16 115/75 100 % BM        Pulse Ox %: 100 % (12/03/22 1819)  Pulse Ox Interpretation: Normal (12/03/22 1819)           Physical Exam   PHYSICAL EXAM     Physical Exam  Vitals and nursing note reviewed.   Constitutional:       General: She is not in acute distress.     Appearance: Normal appearance. She is normal weight. She is not ill-appearing, toxic-appearing or diaphoretic.   HENT:      Head: Normocephalic and atraumatic.   Eyes:      Extraocular Movements: Extraocular movements intact.      Conjunctiva/sclera: Conjunctivae normal.      Pupils: Pupils are equal, round, and reactive to light.   Cardiovascular:      Rate and Rhythm: Normal rate and regular rhythm.      Pulses: Normal pulses.      Heart sounds: Normal heart sounds.   Pulmonary:      Effort: Pulmonary effort is normal.      Breath  sounds: Normal breath sounds.   Abdominal:      General: Bowel sounds are normal. There is no distension.      Palpations: Abdomen is soft.      Tenderness: There is no abdominal tenderness. There is no guarding or rebound.      Comments: Incision is c/d/i   Musculoskeletal:         General: No tenderness. Normal range of motion.      Cervical back: Normal range of motion and neck supple.      Right lower leg: No edema.      Left lower leg: No edema.   Skin:     General: Skin is warm and dry.      Findings: No rash.   Neurological:      General: No focal deficit present.      Mental Status: She is alert and oriented to person, place, and time.      Cranial Nerves: No cranial nerve deficit.      Sensory: No sensory deficit.      Motor: No weakness.      Coordination: Coordination normal.      Gait: Gait normal.   Psychiatric:         Mood and Affect: Mood normal.         Behavior: Behavior normal.         Thought Content: Thought content normal.         Judgment: Judgment normal.           PROCEDURES     Procedures     DATA     Results     Procedure Component Value Units Date/Time    UA with reflex culture [[id number]]  (Abnormal) Collected: 12/03/22 2016    Specimen: Urine, Clean Catch Updated: 12/03/22 2027    Narrative:      The following orders were created for panel order UA with reflex culture.  Procedure                               Abnormality         Status                     ---------                               -----------         ------                     UA Reflex to Culture (Ma...[[id number]]  Abnormal            Final result               UA Microscopic[[id number]]               Abnormal            Final result                 Please view results for these tests on the individual orders.    UA Microscopic [[id number]]  (Abnormal) Collected: 12/03/22 2016    Specimen: Urine, Clean Catch Updated: 12/03/22 2027     RBC, Urine Too Numerous To Count /HPF      WBC, Urine 0 TO 3 /HPF      Squamous Epithelial  +1 /hpf      Hyaline Cast None Seen /lpf      Bacteria, Urine Rare /HPF      MUCUSUA Rare /LPF     UA Reflex to Culture (Macroscopic) [[id number]]  (Abnormal) Collected: 12/03/22 2016    Specimen: Urine, Clean Catch Updated: 12/03/22 2026     Color, Urine Colorless     Clarity, Urine Clear     Specific Gravity, Urine 1.009     pH, Urine 7.0     Leukocyte Esterase Negative     Comment: Results can be falsely negative due to high specific gravity, some antibiotics, glucose >3 g/dl, or WBC other than neutrophils.        Nitrite, Urine Negative     Protein, Urine Negative     Glucose, Urine Negative mg/dL      Ketones, Urine Negative mg/dL      Urobilinogen, Urine 0.2 EU/dL      Bilirubin, Urine Negative mg/dL      Blood, Urine +3     Comment: The sensitivity of the occult blood test is equivalent to approximately 4 intact RBC/HPF.       Comprehensive metabolic panel [[id number]]  (Abnormal) Collected: 12/03/22 1855    Specimen: Blood, Venous Updated: 12/03/22 1944     Sodium 134 mEQ/L      Potassium 3.6 mEQ/L      Comment: Results obtained on plasma. Plasma Potassium values may be up to 0.4 mEQ/L less than serum values. The differences may be greater for patients with high platelet or white cell counts.        Chloride 102 mEQ/L      CO2 24 mEQ/L      BUN 7 mg/dL      Creatinine 0.6 mg/dL      Glucose 98 mg/dL      Calcium 8.4 mg/dL      AST (SGOT) 33 IU/L      ALT (SGPT) 32 IU/L      Alkaline Phosphatase 96 IU/L      Total Protein 6.2 g/dL      Comment: Test performed on plasma which typically contains approximately 0.4 g/dL more protein than serum.        Albumin 2.9 g/dL      Bilirubin, Total 0.4 mg/dL      eGFR >60.0 mL/min/1.73m*2      Anion Gap 8 mEQ/L     SARS-CoV-2 (COVID-19), PCR Nasopharynx [[id number]]  (Normal) Collected: 12/03/22 1858    Specimen: Nasopharyngeal Swab from Nasopharynx Updated: 12/03/22 1934    Narrative:      The following orders were created for panel order SARS-CoV-2 (COVID-19), PCR  Nasopharynx.  Procedure                               Abnormality         Status                     ---------                               -----------         ------                     SARS-COV-2 (COVID-19)/ F...[[id number]]  Normal              Final result                 Please view results for these tests on the individual orders.    SARS-COV-2 (COVID-19)/ FLU A/B, AND RSV, PCR Nasopharynx [[id number]]  (Normal) Collected: 12/03/22 1858    Specimen: Nasopharyngeal Swab from Nasopharynx Updated: 12/03/22 1934     SARS-CoV-2 (COVID-19) Negative     Influenza A Negative     Influenza B Negative    Narrative:      Testing performed using real-time PCR for detection of COVID-19. EUA approved validation studies performed on site.     CBC and differential [[id number]]  (Abnormal) Collected: 12/03/22 1855    Specimen: Blood, Venous Updated: 12/03/22 1923     WBC 8.83 K/uL      RBC 3.19 M/uL      Hemoglobin 10.0 g/dL      Hematocrit 29.9 %      MCV 93.7 fL      MCH 31.3 pg      MCHC 33.4 g/dL      RDW 13.7 %      Platelets 240 K/uL      Comment: RESULTS CHECKED. SPECIMEN QUALITY CHECKED        MPV 9.9 fL      Differential Type Auto     nRBC 0.0 %      Immature Granulocytes 1.6 %      Neutrophils 74.3 %      Lymphocytes 17.1 %      Monocytes 4.1 %      Eosinophils 2.7 %      Basophils 0.2 %      Immature Granulocytes, Absolute 0.14 K/uL      Neutrophils, Absolute 6.56 K/uL      Lymphocytes, Absolute 1.51 K/uL      Monocytes, Absolute 0.36 K/uL      Eosinophils, Absolute 0.24 K/uL      Basophils, Absolute 0.02 K/uL           Imaging Results          X-RAY CHEST 1 VIEW (Preliminary result)  Result time 12/03/22 19:23:32    ED Interpretation    NAD - reviewed with Dr. Edwin WADE venous leg left (Final result)  Result time 12/03/22 17:34:35    Final result                 Impression:    IMPRESSION:  No femoral/popliteal deep venous thrombosis in the left leg.               Narrative:    CLINICAL  HISTORY: Left leg swelling    COMMENT:    Venous ultrasound of the left lower extremity was performed using grayscale,  color Doppler and spectral Doppler techniques.    LEFT: There is normal compressibility of the common femoral, femoral, popliteal,  and imaged portions of the calf veins. There is normal spontaneous and phasic  variation throughout the leg by spectral Doppler. Imaged portions of the right  common femoral vein are within normal limits.                                No orders to display       Scoring tools                                  ED Course & MDM   MDM / ED COURSE / CLINICAL IMPRESSION / DISPO     MDM    ED Course as of 12/03/22 2033   Sat Dec 03, 2022   1906 B/l LE are without swelling at time of my eval, US noted. Plan for labs, repeat VS.  [CL]   1923 WBC: 8.83 [CL]   1923 Temp: 36.7 °C (98.1 °F) [CL]   1923 BP: 115/75 [CL]   1923 Leukocyte Esterase: Negative [CL]   2029 Nitrite, Urine: Negative [CL]   2030 BP: 125/65 [CL]   2030 Patient given strict return precautions. Advised close f/u with PCP, her OBGYN. Patient expressed understanding and agreement with tx plan. All questions answered. Patient ambulating independently, tolerating PO. VS stable.  [CL]      ED Course User Index  [CL] Link, MARGOTH Morgan     Clinical Impression      Peripheral edema     _________________     ED Disposition   Discharge                   Link, MARGOTH Morgan  12/03/22 2033

## 2022-12-04 NOTE — ED ATTESTATION NOTE
The patient was evaluated and managed by the physician assistant / nurse practitioner.     Juan Pineda DO  12/03/22 2047

## 2022-12-12 ENCOUNTER — OFFICE VISIT (OUTPATIENT)
Dept: OBSTETRICS AND GYNECOLOGY | Facility: CLINIC | Age: 32
End: 2022-12-12
Payer: COMMERCIAL

## 2022-12-12 VITALS — DIASTOLIC BLOOD PRESSURE: 84 MMHG | SYSTOLIC BLOOD PRESSURE: 120 MMHG

## 2022-12-12 PROCEDURE — 0503F POSTPARTUM CARE VISIT: CPT | Performed by: OBSTETRICS & GYNECOLOGY

## 2022-12-12 NOTE — PROGRESS NOTES
Patient ID: Diana Sherman    : 1990  MRN: [id number]     Visit Date: 2022    Subjective   Rosamaria Sherman is presenting today for Wound Check    S/P RCS on      Pregnancy uncomplicated     Feeling good  Some tenderness, not taking medication  No bowel/ bladder issues   Moderate vaginal bleeding   Mood good  Breast feeding    Pap 2022 LSIL + HPV   Needs colposcopy postpartum       Vital Signs for this encounter:   Visit Vitals  /84   LMP 2022 (Exact Date)   Breastfeeding Yes       Obstetric History:   OB History    Para Term  AB Living   3 2 2 0 1 2   SAB IAB Ectopic Multiple Live Births   0 0 0 0 2      # Outcome Date GA Lbr Andrea/2nd Weight Sex Delivery Anes PTL Lv   3 Term 22 39w0d  3289 g (7 lb 4 oz) M CS-LTranv Spinal N REINALDO   2 Term 21 39w4d  3880 g (8 lb 8.9 oz) M CS-LTranv EPI N REINALDO      Complications: Fetal Intolerance of Labor   1 AB              Past Medical History:  has a past medical history of Anxiety.  Past Surgical History:  has a past surgical history that includes tonsillectomy & adenoidectomy () and  section (2021).  Family History: family history includes Liver cancer in her biological father and father's brother; Ovarian cancer in her father's sister.  Social History:   Social History     Tobacco Use   • Smoking status: Never   • Smokeless tobacco: Never   Substance Use Topics   • Alcohol use: Not Currently   • Drug use: Never     Medications:   Current Outpatient Medications:   •  prenatal vit-iron fum-folic ac 27 mg iron- 0.8 mg tablet tablet, Take 1 tablet by mouth daily., Disp: , Rfl:   •  docosahexaenoic acid (DHA PRENATAL ORAL), Take by mouth., Disp: , Rfl:   •  ibuprofen (MOTRIN) 600 mg tablet, Take 1 tablet (600 mg total) by mouth every 6 (six) hours for 10 days., Disp: 40 tablet, Rfl: 0  •  sennosides-docusate sodium (SENOKOT-S) 8.6-50 mg, Take 1 tablet by mouth 2 (two) times a day., Disp: 60 tablet, Rfl:  0    Allergies: has No Known Allergies.     HPI  Review of Systems  Physical Exam  Constitutional:       Appearance: Normal appearance.   Pulmonary:      Effort: Pulmonary effort is normal.   Abdominal:      General: There is no distension.      Palpations: There is no mass.      Tenderness: There is no abdominal tenderness. There is no guarding or rebound.      Hernia: No hernia is present.      Comments: Incision c/d/i   Neurological:      Mental Status: She is alert.   Psychiatric:         Mood and Affect: Mood normal.         Behavior: Behavior normal.          Diagnoses and all orders for this visit:    Postpartum care following  delivery (Primary)      Healing appropriately  Return for 6 week PP visit  Needs colposcopy PP     Jesusita Ni MD

## 2023-01-22 NOTE — PROGRESS NOTES
[facility]      Name:  Shanon Bean   Age:  52 y.o.  Sex:  female  :  1970  MRN:  [id number]   Visit Number:  [id number]  Admission Date:  2023  Date Of Service:  23  Primary Care Physician:  Carmen Barney MD    Chief Complaint:     Left arm numbness    History Of Presenting Illness:      Mrs. Sandoval is a 52-year-old female that presented to the emergency department with a 3-day history of left arm heaviness/numbness.  Pertinent past medical history includes sarcoidosis for which she follows with Dr. 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  Patient also is experiencing associated dizziness and lightheadedness.  Stated that she has numbness and heaviness up to her left bicep which is constant.  Of note patient also has some numbness to her right hand which is intermittent.  Patient denied any associated chest pain, shortness of air, fever, anxiety, urinary symptoms, confusion, or unilateral weakness.  Given patient prior history patient became very concerned as her left upper extremity numbness was worsening.  Of note patient also had similar episode in 2021.    Upon ED presentation patient hemodynamically stable on room air.  Pertinent labs and imaging include negative COVID-19/influenza swab, urinalysis without pattern of infection noted, mildly elevated LFTs, CRP 0.84, TSH/procalcitonin /BNP /troponin WNL.  CT of head without any evidence of acute hemorrhage mass-effect or midline shift.  Given history neurology was consulted and recommended MRI of brain with C-spine in the a.m.  Hospitalist consulted for further medical management.    Review Of Systems:    All systems were reviewed and negative except as mentioned in history of presenting illness, assessment and plan.    Past Medical  Obstetrics Postpartum Progress Note    Events  No acute events overnight.    Subjective  Pt denies Headache, CP, SOB, Nausea, Vomiting, Fevers or Chills. No complaints.    Pain: No  Bleeding: lochia minimal  Diet: taking regular diet  Voiding: without difficulty  Bowel: passing flatus  Ambulating: as tolerated    Vitals  Temp:  [36.6 °C (97.8 °F)-37.3 °C (99.2 °F)] 36.8 °C (98.3 °F)  Heart Rate:  [] 84  Resp:  [16-18] 16  BP: ()/(45-76) 94/55    Physical Exam    General: well appearing, NAD  Heart: RRR, no murmurs, rubs, or gallops  Lungs: Clear to auscultation bilaterally, no wheezes, rales, or rhonchi  Abdomen: soft, nondistended, appropriately tender  Fundus: firm and below umbilicus  Incision: C/D/I   Extremities: symmetric and no edema  Neuro: Grossly intact    Labs  Labs Reviewed:  Lab Results   Component Value Date    ABO O 2021    LABRH Positive 2021      Results from last 7 days   Lab Units 21  0259 21  0315   WBC K/uL 13.68* 10.47   RBC M/uL 3.03* 4.21   HEMOGLOBIN g/dL 9.6* 13.3   HEMATOCRIT % 28.9* 39.2   MCV fL 95.4 93.1   MCH pg 31.7 31.6   MCHC g/dL 33.2 33.9   RDW % 13.6 13.4   PLATELETS K/uL 124* 164   MPV fL 11.4 10.8     Rubella: immune  Rubella Antibodies, IgG   Date Value Ref Range Status   2020 4.41 Immune >0.99 index Final     Comment:                                     Non-immune       <0.90                                  Equivocal  0.90 - 0.99                                  Immune           >0.99         Assessment/Plan   Problem-based Assessment and Plan    Rosamaria Dunne is a 30 y.o.  POD# 1 s/p 1'LTCS 2/2 NRFHT w/ B-dobson and PPH    1. Vital Signs: stable  2. Hemodynamics/PPH: stable Hgb 13.3>9.6. No signs or symptoms of acute anemia. EBL 1L, sp 10u extra pitocin, methergine, hemabate x2, cytotec 1000mcg RI and B dobson stitch  3. Pain: controlled  4. VTE Assessment: Body mass index is 29.26 kg/m². Continue Encouraging Ambulation  5.  "History: Patient  has a past medical history of Alpha 1-antitrypsin PiMS phenotype, Anti-phospholipid syndrome (HCC), Blood clotting disorder (HCC), and Sarcoidosis.    Past Surgical History: Patient  has a past surgical history that includes Cholecystectomy;  section; and Sinus surgery.    Social History: Patient  reports that she has been smoking cigarettes. She has been smoking an average of .5 packs per day. She does not have any smokeless tobacco history on file. She reports that she does not drink alcohol and does not use drugs.    Family History:  Patient's family history has been reviewed and found to be noncontributory.     Allergies:      Penicillins and Sulfa antibiotics    Home Medications:    Prior to Admission Medications     Prescriptions Last Dose Informant Patient Reported? Taking?    Xarelto 20 MG tablet   Yes No        ED Medications:    Medications   sodium chloride 0.9 % flush 10 mL (has no administration in time range)   cephalexin (KEFLEX) capsule 500 mg (has no administration in time range)   rivaroxaban (XARELTO) tablet 20 mg (has no administration in time range)     Vital Signs:  Temp:  [98 °F (36.7 °C)] 98 °F (36.7 °C)  Heart Rate:  [63-77] 63  Resp:  [16] 16  BP: (128-152)/(78-87) 128/78        23  0952   Weight: 98.4 kg (217 lb)     Body mass index is 35.02 kg/m².    Physical Exam:     Most recent vital Signs: /78   Pulse 63   Temp 98 °F (36.7 °C) (Oral)   Resp 16   Ht 167.6 cm (66\")   Wt 98.4 kg (217 lb)   SpO2 96%   BMI 35.02 kg/m²     Physical Exam  Vitals and nursing note reviewed.   Constitutional:       General: She is not in acute distress.     Appearance: She is not ill-appearing or toxic-appearing.      Comments: Right-sided chronic facial droop from prior injury at birth secondary to forceps   HENT:      Head: Normocephalic.      Nose: Nose normal.   Eyes:      Pupils: Pupils are equal, round, and reactive to light.   Cardiovascular:      Rate and " Vaccinations/Rhogam: rhogam not indicated   6. Post care: Meeting appropriate postpartum milestones. Continue routine post partum care.        Anthony Choe MD PGY2  Obstetrics and Gynecology  Beeper #[id number]    Rhythm: Normal rate and regular rhythm.   Pulmonary:      Effort: Pulmonary effort is normal.      Breath sounds: Normal breath sounds.      Comments: On room air.  Abdominal:      General: Bowel sounds are normal.      Palpations: Abdomen is soft.   Musculoskeletal:         General: Normal range of motion.      Cervical back: Normal range of motion.   Skin:     General: Skin is warm.      Capillary Refill: Capillary refill takes less than 2 seconds.   Neurological:      General: No focal deficit present.      Mental Status: She is alert and oriented to person, place, and time.         Laboratory data:    I have reviewed the labs done in the emergency room.    Results from last 7 days   Lab Units 01/22/23  1007   SODIUM mmol/L 140   POTASSIUM mmol/L 4.0   CHLORIDE mmol/L 105   CO2 mmol/L 23.4   BUN mg/dL 10   CREATININE mg/dL 0.73   CALCIUM mg/dL 9.4   BILIRUBIN mg/dL 0.3   ALK PHOS U/L 104   ALT (SGPT) U/L 55*   AST (SGOT) U/L 47*   GLUCOSE mg/dL 113*     Results from last 7 days   Lab Units 01/22/23  1007   WBC 10*3/mm3 7.26   HEMOGLOBIN g/dL 14.7   HEMATOCRIT % 46.0   PLATELETS 10*3/mm3 197         Results from last 7 days   Lab Units 01/22/23  1007   TROPONIN T ng/mL <0.010     Results from last 7 days   Lab Units 01/22/23  1007   PROBNP pg/mL 54.1                 Results from last 7 days   Lab Units 01/22/23  1104   COLOR UA  Yellow   GLUCOSE UA  Negative   KETONES UA  Negative   LEUKOCYTES UA  Small (1+)*   PH, URINE  6.5   BILIRUBIN UA  Negative   UROBILINOGEN UA  0.2 E.U./dL   RBC UA /HPF None Seen   WBC UA /HPF 6-12*       Pain Management Panel    There is no flowsheet data to display.         EKG:      Sinus rhythm 75 bpm    Radiology:    CT Head Without Contrast    Result Date: 1/22/2023    PROCEDURE: CT HEAD WO CONTRAST-  HISTORY:Paresthesias left upper extremity  COMPARISON:10/31/2021  TECHNIQUE: Multiple axial CT images were performed from the foramen magnum to the vertex without enhancement.   FINDINGS:There is no evidence of acute hemorrhage. Is no evidence of mass effect or midline shift. There is no evidence of displaced fracture. The gray-white junction is intact. Postoperative changes with bilateral maxillary antrostomies. Consider further assessment with MRI.      No evidence of acute hemorrhage, mass effect, or midline shift.     This study was performed with techniques to keep radiation doses as low as reasonably achievable (ALARA). Individualized dose reduction techniques using automated exposure control or adjustment of mA and/or kV according to the patient size were employed.   This report was signed and finalized on 1/22/2023 11:25 AM by Joseph Lawson DO.    XR Chest 1 View    Result Date: 1/22/2023  PROCEDURE: XR CHEST 1 VW-  HISTORY: SOA Triage Protocol  COMPARISON: 10/31/2021.  FINDINGS: The heart is normal in size. The mediastinum is unremarkable. The lungs are clear. There is no pneumothorax.  There are no acute osseous abnormalities.      No acute cardiopulmonary process.  Continued followup is recommended.  This report was signed and finalized on 1/22/2023 10:24 AM by Joseph Lawson DO.      Assessment:    1. Bilateral arm paraesthesias, POA  2. Dizziness, POA  3. Antiphospholipid Syndrome  4. Alpha Antitrypsin 1 Deficiency  5. Tobacco dependence  6. History of DVT's on Xarelto      Plan:    - Neuro checks, continue on cardiac monitor.  - Lipid/A1c pending  - TSH normal.  - MRI in AM.  - Continue Xarelto  - Consider outpatient Nerve conduction studies  - Tobacco cessation advised.  - Further orders as clinical course dictates.    Risk Assessment: High  DVT Prophylaxis: Xarelto  Code Status: Full  Diet: Cardiac    Advance Care Planning   ACP discussion was declined by the patient. Patient does not have an advance directive, declines further assistance.           Denisse Ramirez, APRN  01/22/23  13:10 EST    Dictated utilizing Dragon dictation.

## 2023-01-24 NOTE — PROGRESS NOTES
"Patient ID: Diana Sherman    : 1990  MRN: [id number]     Visit Date: 2023    Subjective   Rosamaria Sherman is presenting today for Postpartum Follow-up      S/P RCS on    Left peritoneal adhesions noted      Pregnancy uncomplicated      Feeling good  No pain  No bowel/ bladder issues   No vaginal bleeding   Mood good- irritable but overall ok   Breast feeding- going well  Baby boy Ranjith  No sex since delivery     Pap 2022 LSIL + HPV   Needs colposcopy postpartum   Vital Signs for this encounter:   Visit Vitals  /74   Ht 1.575 m (5' 2\")   Wt 63 kg (139 lb)   LMP 2022 (Exact Date)   Breastfeeding Yes   BMI 25.42 kg/m²       Obstetric History:   OB History    Para Term  AB Living   3 2 2 0 1 2   SAB IAB Ectopic Multiple Live Births   0 0 0 0 2      # Outcome Date GA Lbr Andrea/2nd Weight Sex Delivery Anes PTL Lv   3 Term 22 39w0d  3289 g (7 lb 4 oz) M CS-LTranv Spinal N REINALDO   2 Term 19 39w4d  3880 g (8 lb 8.9 oz) M CS-LTranv EPI N REINALDO      Complications: Fetal Intolerance of Labor   1 AB              Past Medical History:  has a past medical history of Anxiety.  Past Surgical History:  has a past surgical history that includes tonsillectomy & adenoidectomy () and  section (2021).  Family History: family history includes Liver cancer in her biological father and father's brother; Ovarian cancer in her father's sister.  Social History:   Social History     Tobacco Use   • Smoking status: Never   • Smokeless tobacco: Never   Substance Use Topics   • Alcohol use: Not Currently   • Drug use: Never     Medications:   Current Outpatient Medications:   •  prenatal vit-iron fum-folic ac 27 mg iron- 0.8 mg tablet tablet, Take 1 tablet by mouth daily., Disp: , Rfl:   •  docosahexaenoic acid (DHA PRENATAL ORAL), Take by mouth., Disp: , Rfl:   •  ibuprofen (MOTRIN) 600 mg tablet, Take 1 tablet (600 mg total) by mouth every 6 (six) hours for 10 days., " Disp: 40 tablet, Rfl: 0  •  sennosides-docusate sodium (SENOKOT-S) 8.6-50 mg, Take 1 tablet by mouth 2 (two) times a day., Disp: 60 tablet, Rfl: 0    Allergies: has No Known Allergies.     HPI  Review of Systems  Physical Exam  Constitutional:       Appearance: She is well-developed.   Genitourinary:      Urethra, vagina, cervix, uterus and urethral meatus normal.      Uterus is regular.     External female genitalia normal.   Urethral meatus normal.   Urethra normal.   Normal bladder.   Vagina normal.   Cervix exam normal.  Uterus is normal. Uterine contour is regular.   Adnexa normal. Neck:      Thyroid: No thyromegaly.   Pulmonary:      Effort: Pulmonary effort is normal.   Chest:   Breasts:     Right: No inverted nipple, mass, nipple discharge, skin change or tenderness.      Left: No inverted nipple, mass, nipple discharge, skin change or tenderness.   Abdominal:      General: There is no distension.      Palpations: Abdomen is soft. There is no mass.      Tenderness: There is no abdominal tenderness. There is no rebound.   Musculoskeletal:      Cervical back: Normal range of motion and neck supple.   Neurological:      Mental Status: She is alert and oriented to person, place, and time.   Psychiatric:         Behavior: Behavior normal.         Thought Content: Thought content normal.          Diagnoses and all orders for this visit:    History of abnormal cervical Pap smear (Primary)  -     PAP AND HR HPV W/REFLEX TO GENOTYPING 16,18/45    Postpartum care following  delivery    Feeling well overall  Noting some guilt regarding RCS- will continue to monitor.  If persistent recommend meeting with a therapist  Considering nexplanon for contraception   Healing appropriately  Cleared for normal activities  Has colposcopy scheduled       The following have been marked reviewed and updated as appropriate in this visit:        No follow-ups on file.  Jesusita Ni MD

## 2023-01-25 ENCOUNTER — OFFICE VISIT (OUTPATIENT)
Dept: OBSTETRICS AND GYNECOLOGY | Facility: CLINIC | Age: 33
End: 2023-01-25
Payer: COMMERCIAL

## 2023-01-25 VITALS
DIASTOLIC BLOOD PRESSURE: 74 MMHG | BODY MASS INDEX: 25.58 KG/M2 | WEIGHT: 139 LBS | SYSTOLIC BLOOD PRESSURE: 118 MMHG | HEIGHT: 62 IN

## 2023-01-25 DIAGNOSIS — Z87.42 HISTORY OF ABNORMAL CERVICAL PAP SMEAR: Primary | ICD-10-CM

## 2023-01-25 PROCEDURE — 0503F POSTPARTUM CARE VISIT: CPT | Performed by: OBSTETRICS & GYNECOLOGY

## 2023-02-01 LAB
CYTOLOGIST CVX/VAG CYTO: ABNORMAL
CYTOLOGY CVX/VAG DOC CYTO: ABNORMAL
CYTOLOGY CVX/VAG DOC THIN PREP: ABNORMAL
DX ICD CODE: ABNORMAL
DX ICD CODE: ABNORMAL
HPV I/H RISK 4 DNA CVX QL PROBE+SIG AMP: POSITIVE
LAB CORP NOTE:: ABNORMAL
LC HPV GENOTYPE REFLEX: ABNORMAL
OTHER STN SPEC: ABNORMAL
PATHOLOGIST CVX/VAG CYTO: ABNORMAL
STAT OF ADQ CVX/VAG CYTO-IMP: ABNORMAL

## 2023-02-08 ENCOUNTER — OFFICE VISIT (OUTPATIENT)
Dept: OBSTETRICS AND GYNECOLOGY | Facility: CLINIC | Age: 33
End: 2023-02-08
Payer: COMMERCIAL

## 2023-02-08 VITALS — DIASTOLIC BLOOD PRESSURE: 76 MMHG | SYSTOLIC BLOOD PRESSURE: 116 MMHG

## 2023-02-08 DIAGNOSIS — Z32.02 NEGATIVE PREGNANCY TEST: Primary | ICD-10-CM

## 2023-02-08 DIAGNOSIS — R87.612 LGSIL ON PAP SMEAR OF CERVIX: ICD-10-CM

## 2023-02-08 LAB
EXPIRATION DATE: NORMAL
Lab: NORMAL
POCT MANUFACTURER: NORMAL
PREGNANCY TEST URINE, POC: NEGATIVE

## 2023-02-08 PROCEDURE — 81025 URINE PREGNANCY TEST: CPT | Performed by: OBSTETRICS & GYNECOLOGY

## 2023-02-08 PROCEDURE — 99999 PR OFFICE/OUTPT VISIT,PROCEDURE ONLY: CPT | Performed by: OBSTETRICS & GYNECOLOGY

## 2023-02-08 PROCEDURE — 57454 BX/CURETT OF CERVIX W/SCOPE: CPT | Performed by: OBSTETRICS & GYNECOLOGY

## 2023-02-08 RX ORDER — NORETHINDRONE 0.35 MG/1
1 TABLET ORAL DAILY
Qty: 84 TABLET | Refills: 3 | Status: SHIPPED | OUTPATIENT
Start: 2023-02-08 | End: 2024-01-03

## 2023-02-08 NOTE — PATIENT INSTRUCTIONS
"Progestin-only pills -- Some pills contain only progestin (sometimes called the \"mini pill\"); these may be an option for people who cannot or should not take estrogen. This includes those who are breastfeeding or who have worsened migraines or high blood pressure with combination contraceptive pills. Progestin-only pills appear to be as effective as combination pills when taken at the same time every day, but they have a slightly higher failure rate if you are more than three hours late in taking them. A backup method of birth control should be used for seven days if you forget a pill or are more than three hours late in taking it. Breakthrough bleeding or spotting is common with both types of progestin-only pills.    Progestin-only pills are available in 28-day packs. Two types of progestin-only pills are available in the [place]. For one type (containing norethindrone), all 28 pills contain the hormone (ie, there is no \"placebo week\"). For the second type (containing drospirenone), each pack includes 24 hormone pills and 4 placebo pills. The drospirenone pill may be more effective than the norethindrone pill. A disadvantage of the drospirenone pill is that no generic is available, which may make it more expensive.    "

## 2023-02-08 NOTE — PROCEDURES
Colposcopy    Date/Time: 2/8/2023 11:34 AM  Performed by: Jesusita Ni MD  Authorized by: Jesusita Ni MD     Procedure location: cervix    Consent:     Consent obtained:  Verbal and written    Consent given by:  Patient  Indication:     Cervical indication(s): cervical LSIL    Pre-procedure:     Prep solution(s): acetic acid      Total amount used (mL):  0    Negative urine pregnancy test: yes    Procedure:     Biopsy taken: yes      Biopsy location(s): 6, ECC     Colposcopy details:  Normal appearing cervix, SCJ not seen    Cervix visibility: fully visualized      SCJ visibility: not fully visualized      Cervical impression: normal/benign      Ferric subsulfate solution applied: yes    Post-procedure:     Patient tolerance of procedure:  Patient tolerated the procedure well with no immediate complications    Estimated blood loss (mL):  2

## 2023-02-10 LAB
CLINICAL INFO: NORMAL
PATH REPORT.FINAL DX SPEC: NORMAL
PATH REPORT.FINAL DX SPEC: NORMAL
PATH REPORT.GROSS SPEC: NORMAL
PATH REPORT.GROSS SPEC: NORMAL
PATHOLOGIST NAME: NORMAL
SPECIMEN SOURCE: NORMAL
SPECIMEN SOURCE: NORMAL

## 2023-08-24 NOTE — PROGRESS NOTES
Visit Date: 2023   Rosamaria Sherman is 32 y.o. P2 female presenting today for problem visit.    Pt c/o lower abnl/pelvic pain for about the last month. Gradually worsening/ consistent. Dull pain. Has used advil only twice. Some bladder discomfort; no dysuria, no frequency. No changes in BM.    Still nursing; no menses since delivery. Delivered 22- boy.     On Micronor currently. H/o C/S x 2   S/P RCS on    Left peritoneal adhesions noted     The following have been reviewed and updated as appropriate in this visit:  Allergies  Meds  Problems       Visit Vitals  BP 98/70   Wt 57.6 kg (127 lb)   LMP  (LMP Unknown)   Breastfeeding Yes   BMI 23.23 kg/m²     Menstrual History:  OB History        3    Para   2    Term   2       0    AB   1    Living   2       SAB   0    IAB   0    Ectopic   0    Multiple   0    Live Births   2                No LMP recorded (lmp unknown).       Medications:   Current Outpatient Medications:   •  norethindrone (ORTHO MICRONOR) 0.35 mg tablet, Take 1 tablet (0.35 mg total) by mouth daily., Disp: 84 tablet, Rfl: 3  •  prenatal vit-iron fum-folic ac 27 mg iron- 0.8 mg tablet tablet, Take 1 tablet by mouth daily., Disp: , Rfl:     Allergies: has No Known Allergies.     Past Medical History:  has a past medical history of Anxiety.  Past Surgical History:  has a past surgical history that includes tonsillectomy & adenoidectomy () and  section (2021).  Family History: family history includes Liver cancer in her biological father and father's brother; Ovarian cancer in her father's sister.  Social History:   Social History     Tobacco Use   • Smoking status: Never   • Smokeless tobacco: Never   Substance Use Topics   • Alcohol use: Not Currently   • Drug use: Never     The patient is sexually active.    Physical Exam  Visit Vitals  BP 98/70   Wt 57.6 kg (127 lb)   LMP  (LMP Unknown)   Breastfeeding Yes   BMI 23.23 kg/m²          General  Appearance: Alert, cooperative, no acute distress  Head: Normocephalic, without obvious abnormality, atraumatic  Abdomen: Soft, nontender, nondistended,   no masses, no organomegaly; mild SP tenderness; no rebound or guarding  Pelvic:  Vulva:  normal  Vagina: normal mucosa  Cervix: no cervical motion tenderness and no lesions  Uterus: normal size, anteverted, mild SP tenderness   Adnexa: no mass, fullness, tenderness and poss small L ovarian cyst; mildly tender    Assessment & Plan    Pelvic pain    -Gc/chlamydia culture done  -CC UA C&S sent  -Rx pelvic u/s  -contact w results     Return in about 5 months (around 1/25/2024) for Annual Gyn Exam due.  Ezra Avelar MD

## 2023-08-25 ENCOUNTER — OFFICE VISIT (OUTPATIENT)
Dept: OBSTETRICS AND GYNECOLOGY | Facility: CLINIC | Age: 33
End: 2023-08-25
Payer: COMMERCIAL

## 2023-08-25 VITALS — DIASTOLIC BLOOD PRESSURE: 70 MMHG | BODY MASS INDEX: 23.23 KG/M2 | SYSTOLIC BLOOD PRESSURE: 98 MMHG | WEIGHT: 127 LBS

## 2023-08-25 DIAGNOSIS — R10.2 PELVIC PAIN IN FEMALE: Primary | ICD-10-CM

## 2023-08-25 DIAGNOSIS — Z11.3 SCREEN FOR STD (SEXUALLY TRANSMITTED DISEASE): ICD-10-CM

## 2023-08-25 PROCEDURE — 3008F BODY MASS INDEX DOCD: CPT | Performed by: OBSTETRICS & GYNECOLOGY

## 2023-08-25 PROCEDURE — 99213 OFFICE O/P EST LOW 20 MIN: CPT | Performed by: OBSTETRICS & GYNECOLOGY

## 2023-08-26 LAB
APPEARANCE UR: CLEAR
BACTERIA #/AREA URNS HPF: NORMAL /HPF
BACTERIA UR CULT: NORMAL
BILIRUB UR QL STRIP: NEGATIVE
C TRACH RRNA SPEC QL NAA+PROBE: NOT DETECTED
COLOR UR: YELLOW
GLUCOSE UR QL STRIP: NEGATIVE
HGB UR QL STRIP: NEGATIVE
HYALINE CASTS #/AREA URNS LPF: NORMAL /LPF
KETONES UR QL STRIP: NEGATIVE
LEUKOCYTE ESTERASE UR QL STRIP: NEGATIVE
N GONORRHOEA RRNA SPEC QL NAA+PROBE: NOT DETECTED
NITRITE UR QL STRIP: NEGATIVE
PH UR STRIP: 7 [PH] (ref 5–8)
PROT UR QL STRIP: NEGATIVE
QST (ALWAYS MESSAGE): NORMAL
RBC #/AREA URNS HPF: NORMAL /HPF
SERVICE CMNT-IMP: NORMAL
SP GR UR STRIP: 1.01 (ref 1–1.03)
SQUAMOUS #/AREA URNS HPF: NORMAL /HPF
WBC #/AREA URNS HPF: NORMAL /HPF

## 2023-08-30 ENCOUNTER — HOSPITAL ENCOUNTER (OUTPATIENT)
Dept: RADIOLOGY | Age: 33
Discharge: HOME | End: 2023-08-30
Attending: OBSTETRICS & GYNECOLOGY
Payer: COMMERCIAL

## 2023-08-30 DIAGNOSIS — R10.2 PELVIC PAIN IN FEMALE: ICD-10-CM

## 2023-08-30 PROCEDURE — 76830 TRANSVAGINAL US NON-OB: CPT

## 2023-08-30 PROCEDURE — 76856 US EXAM PELVIC COMPLETE: CPT

## 2023-12-29 ENCOUNTER — OFFICE VISIT (OUTPATIENT)
Dept: PRIMARY CARE | Facility: CLINIC | Age: 33
End: 2023-12-29
Payer: COMMERCIAL

## 2023-12-29 VITALS
RESPIRATION RATE: 16 BRPM | HEIGHT: 63 IN | SYSTOLIC BLOOD PRESSURE: 108 MMHG | BODY MASS INDEX: 22.68 KG/M2 | WEIGHT: 128 LBS | OXYGEN SATURATION: 98 % | TEMPERATURE: 97.8 F | HEART RATE: 104 BPM | DIASTOLIC BLOOD PRESSURE: 70 MMHG

## 2023-12-29 DIAGNOSIS — R53.83 OTHER FATIGUE: ICD-10-CM

## 2023-12-29 DIAGNOSIS — Z00.01 ENCOUNTER FOR GENERAL ADULT MEDICAL EXAMINATION WITH ABNORMAL FINDINGS: Primary | ICD-10-CM

## 2023-12-29 DIAGNOSIS — Z13.220 SCREENING FOR HYPERLIPIDEMIA: ICD-10-CM

## 2023-12-29 DIAGNOSIS — Z23 NEED FOR IMMUNIZATION AGAINST INFLUENZA: ICD-10-CM

## 2023-12-29 DIAGNOSIS — Z13.1 SCREENING FOR DIABETES MELLITUS: ICD-10-CM

## 2023-12-29 DIAGNOSIS — M79.10 MYALGIA: ICD-10-CM

## 2023-12-29 DIAGNOSIS — H53.9 VISION CHANGES: ICD-10-CM

## 2023-12-29 PROCEDURE — 3008F BODY MASS INDEX DOCD: CPT | Performed by: PHYSICIAN ASSISTANT

## 2023-12-29 PROCEDURE — 90686 IIV4 VACC NO PRSV 0.5 ML IM: CPT | Performed by: PHYSICIAN ASSISTANT

## 2023-12-29 PROCEDURE — 90471 IMMUNIZATION ADMIN: CPT | Performed by: PHYSICIAN ASSISTANT

## 2023-12-29 PROCEDURE — 99385 PREV VISIT NEW AGE 18-39: CPT | Mod: 25 | Performed by: PHYSICIAN ASSISTANT

## 2023-12-29 ASSESSMENT — ENCOUNTER SYMPTOMS
COUGH: 0
PALPITATIONS: 0
HEMATURIA: 0
CONSTIPATION: 0
ABDOMINAL PAIN: 0
SORE THROAT: 0
CHILLS: 0
APNEA: 0
BRUISES/BLEEDS EASILY: 0
EYE PAIN: 0
NECK PAIN: 0
TREMORS: 0
LIGHT-HEADEDNESS: 0
BLOOD IN STOOL: 0
NERVOUS/ANXIOUS: 0
DIARRHEA: 0
DECREASED CONCENTRATION: 0
SHORTNESS OF BREATH: 0
DYSURIA: 0
DYSPHORIC MOOD: 0
APPETITE CHANGE: 0
DIAPHORESIS: 0
WHEEZING: 0
VOMITING: 0
NAUSEA: 0
MYALGIAS: 1
ARTHRALGIAS: 1
BACK PAIN: 0
NUMBNESS: 0
FATIGUE: 0
HEADACHES: 1
WEAKNESS: 0
JOINT SWELLING: 0
DIZZINESS: 0
TROUBLE SWALLOWING: 0
FREQUENCY: 0
ADENOPATHY: 0
SLEEP DISTURBANCE: 0
UNEXPECTED WEIGHT CHANGE: 0
FEVER: 0

## 2023-12-29 ASSESSMENT — PATIENT HEALTH QUESTIONNAIRE - PHQ9: SUM OF ALL RESPONSES TO PHQ9 QUESTIONS 1 & 2: 0

## 2023-12-29 NOTE — PATIENT INSTRUCTIONS
Make follow up with GYN for routine exam/PAP.     Dr. Bermudez Psychologist in our office if you want to book an appt. Ask at check out or call back at any time.    Derm:  Dr. Simpson

## 2024-01-03 RX ORDER — NORETHINDRONE 0.35 MG/1
1 TABLET ORAL DAILY
Qty: 84 TABLET | Refills: 0 | Status: SHIPPED | OUTPATIENT
Start: 2024-01-03 | End: 2024-02-14

## 2024-01-03 NOTE — TELEPHONE ENCOUNTER
"Medicine Refill Request    Last Office Visit: 8/25/2023   Last Consult Visit: Visit date not found  Last Telemedicine Visit: 1/19/2021 Roxann Marquis,     Next Appointment: Visit date not found      Current Outpatient Medications:   •  norethindrone (ORTHO MICRONOR) 0.35 mg tablet, Take 1 tablet (0.35 mg total) by mouth daily., Disp: 84 tablet, Rfl: 3      BP Readings from Last 3 Encounters:   12/29/23 108/70   08/25/23 98/70   02/08/23 116/76       Recent Lab results:  No results found for: \"CHOL\", No results found for: \"HDL\", No results found for: \"LDLCALC\", No results found for: \"TRIG\"     Lab Results   Component Value Date    GLUCOSE NEGATIVE 08/25/2023   , No results found for: \"HGBA1C\"      Lab Results   Component Value Date    CREATININE 0.6 12/03/2022       No results found for: \"TSH\"      No results found for: \"HGBA1C\"    "

## 2024-01-07 LAB
ALBUMIN SERPL-MCNC: 4.6 G/DL (ref 3.6–5.1)
ALBUMIN/GLOB SERPL: 2.4 (CALC) (ref 1–2.5)
ALP SERPL-CCNC: 54 U/L (ref 31–125)
ALT SERPL-CCNC: 14 U/L (ref 6–29)
AST SERPL-CCNC: 17 U/L (ref 10–30)
B BURGDOR AB SER IA-ACNC: <0.9 INDEX
BASOPHILS # BLD AUTO: 41 CELLS/UL (ref 0–200)
BASOPHILS NFR BLD AUTO: 0.8 %
BILIRUB SERPL-MCNC: 0.7 MG/DL (ref 0.2–1.2)
BUN SERPL-MCNC: 15 MG/DL (ref 7–25)
BUN/CREAT SERPL: NORMAL (CALC) (ref 6–22)
CALCIUM SERPL-MCNC: 9.3 MG/DL (ref 8.6–10.2)
CHLORIDE SERPL-SCNC: 105 MMOL/L (ref 98–110)
CHOLEST SERPL-MCNC: 172 MG/DL
CHOLEST/HDLC SERPL: 2.3 (CALC)
CO2 SERPL-SCNC: 28 MMOL/L (ref 20–32)
CREAT SERPL-MCNC: 0.72 MG/DL (ref 0.5–0.97)
EGFRCR SERPLBLD CKD-EPI 2021: 112 ML/MIN/1.73M2
EOSINOPHIL # BLD AUTO: 122 CELLS/UL (ref 15–500)
EOSINOPHIL NFR BLD AUTO: 2.4 %
ERYTHROCYTE [DISTWIDTH] IN BLOOD BY AUTOMATED COUNT: 11.7 % (ref 11–15)
GLOBULIN SER CALC-MCNC: 1.9 G/DL (CALC) (ref 1.9–3.7)
GLUCOSE SERPL-MCNC: 79 MG/DL (ref 65–99)
HBA1C MFR BLD: 4.8 % OF TOTAL HGB
HCT VFR BLD AUTO: 39.5 % (ref 35–45)
HDLC SERPL-MCNC: 74 MG/DL
HGB BLD-MCNC: 12.9 G/DL (ref 11.7–15.5)
LDLC SERPL CALC-MCNC: 81 MG/DL (CALC)
LYMPHOCYTES # BLD AUTO: 1642 CELLS/UL (ref 850–3900)
LYMPHOCYTES NFR BLD AUTO: 32.2 %
MCH RBC QN AUTO: 29.4 PG (ref 27–33)
MCHC RBC AUTO-ENTMCNC: 32.7 G/DL (ref 32–36)
MCV RBC AUTO: 90 FL (ref 80–100)
MONOCYTES # BLD AUTO: 301 CELLS/UL (ref 200–950)
MONOCYTES NFR BLD AUTO: 5.9 %
NEUTROPHILS # BLD AUTO: 2994 CELLS/UL (ref 1500–7800)
NEUTROPHILS NFR BLD AUTO: 58.7 %
NONHDLC SERPL-MCNC: 98 MG/DL (CALC)
PLATELET # BLD AUTO: 231 THOUSAND/UL (ref 140–400)
PMV BLD REES-ECKER: 10.8 FL (ref 7.5–12.5)
POTASSIUM SERPL-SCNC: 4.2 MMOL/L (ref 3.5–5.3)
PROT SERPL-MCNC: 6.5 G/DL (ref 6.1–8.1)
RBC # BLD AUTO: 4.39 MILLION/UL (ref 3.8–5.1)
SODIUM SERPL-SCNC: 140 MMOL/L (ref 135–146)
TRIGL SERPL-MCNC: 86 MG/DL
TSH SERPL-ACNC: 1.98 MIU/L
WBC # BLD AUTO: 5.1 THOUSAND/UL (ref 3.8–10.8)

## 2024-02-13 NOTE — PROGRESS NOTES
Chief Complaint:  Annual    HPI:  2024  Rosamaria Sherman is a 33 y.o.  P2 (1 and 1 yo b)  female who presents for an annual gyn exam. Last AV  for 6 week pp exam.    Breast fed until last October. Menses has been heavy and irregular on Micronor.  No new gyn c/os.Nl pelvic u/s .  H/o C/S x 2   S/P RCS on    Left peritoneal adhesions noted     Sig Gyn hx:   yes h/o abnl Pap  no h/o STI    Gardasil: no    Had flu vacc. No new health changes. Plans genetic testing for family h/o cancer- per primary    Marital status: ; SA    Occupation: Homemaker     OB History: Menstrual History:  OB History        3    Para   2    Term   2       0    AB   1    Living   2       SAB   0    IAB   0    Ectopic   0    Multiple   0    Live Births   2                No LMP recorded (lmp unknown).         Medical History:   Past Medical History:   Diagnosis Date   • Anxiety     used to see a therapist, previously on lexapro   • BPPV (benign paroxysmal positional vertigo)    • H/O human papillomavirus infection    • Ovarian cyst        Surgical History:   Past Surgical History:   Procedure Laterality Date   •  SECTION  2021   •  SECTION     • TONSILLECTOMY & ADENOIDECTOMY         Social History:   Social History     Socioeconomic History   • Marital status:      Spouse name: None   • Number of children: None   • Years of education: None   • Highest education level: None   Tobacco Use   • Smoking status: Never   • Smokeless tobacco: Never   Substance and Sexual Activity   • Alcohol use: Yes     Comment: 1 drink/wk. ; special occ.   • Drug use: Never   • Sexual activity: Yes     Partners: Male     Birth control/protection: OCP   Social History Narrative        Children: 2 boys, 2y/o and 2y/o    Occupation: stay at home mom, used to be a teacher    Caffeine: coffee, 2 cups/d     Social Determinants of Health     Food Insecurity: No Food Insecurity (12/3/2022)     "Hunger Vital Sign    • Worried About Running Out of Food in the Last Year: Never true    • Ran Out of Food in the Last Year: Never true       Family History:   Family History   Problem Relation Age of Onset   • Cancer Paternal Grandmother    • Lung cancer Maternal Grandmother         smoker   • Coronary artery disease Maternal Grandfather    • Liver cancer Biological Father    • Cirrhosis Biological Father    • Coronary artery disease Biological Father    • Heart failure Biological Father    • Stroke Biological Father    • Diabetes Biological Father    • No Known Problems Biological Mother    • No Known Problems Biological Brother    • No Known Problems Biological Brother    • No Known Problems Biological Sister    • No Known Problems Biological Sister    • Cervical cancer Father's Sister    • Liver cancer Father's Brother    • Coronary artery disease Father's Brother    • Prostate cancer Father's Brother    • Breast cancer Cousin         Paternal cousin   • Colon cancer Neg Hx        Current Medications:   Outpatient Encounter Medications as of 2/14/2024:   •  drospirenone-ethinyl estradioL (TONNY,GIANVI) 3-0.02 mg per tablet, Take 1 tablet by mouth daily.  •  [DISCONTINUED] norethindrone (MICRONOR) 0.35 mg tablet, TAKE 1 TABLET BY MOUTH EVERY DAY    Allergies: Patient has no known allergies.    Physical Exam  Visit Vitals  /72   Ht 1.575 m (5' 2\")   Wt 58.8 kg (129 lb 9.6 oz)   LMP  (LMP Unknown)   BMI 23.70 kg/m²        General Appearance: Alert, cooperative, no acute distress  Head: Normocephalic, without obvious abnormality, atraumatic  Breast: no masses, nontender and no discharge  Abdomen: Soft, nontender, nondistended,   no masses, no organomegaly  Pelvic:  Vulva:  normal  Vagina: normal mucosa  Cervix: no lesions and nulliparous appearance  Uterus: normal size, anteverted   Adnexa: no mass, fullness, tenderness     exam conducted with chaperone present.          Assessment & Plan    Annual gyn exam " wnl.    Pap w HPV done- colpo if abnl- discussed  -recc Gardasil: brochure provided- will check ins. Coverage and call prn    Contraception Rx change to [facility]    Return in about 1 year (around 2/14/2025).  zEra Avelar MD

## 2024-02-14 ENCOUNTER — OFFICE VISIT (OUTPATIENT)
Dept: OBSTETRICS AND GYNECOLOGY | Facility: CLINIC | Age: 34
End: 2024-02-14
Payer: COMMERCIAL

## 2024-02-14 VITALS
DIASTOLIC BLOOD PRESSURE: 72 MMHG | BODY MASS INDEX: 23.85 KG/M2 | HEIGHT: 62 IN | SYSTOLIC BLOOD PRESSURE: 116 MMHG | WEIGHT: 129.6 LBS

## 2024-02-14 DIAGNOSIS — Z01.419 WELL WOMAN EXAM WITH ROUTINE GYNECOLOGICAL EXAM: Primary | ICD-10-CM

## 2024-02-14 PROCEDURE — 99459 PELVIC EXAMINATION: CPT | Performed by: OBSTETRICS & GYNECOLOGY

## 2024-02-14 PROCEDURE — 99395 PREV VISIT EST AGE 18-39: CPT | Performed by: OBSTETRICS & GYNECOLOGY

## 2024-02-14 PROCEDURE — 3008F BODY MASS INDEX DOCD: CPT | Performed by: OBSTETRICS & GYNECOLOGY

## 2024-02-14 RX ORDER — DROSPIRENONE AND ETHINYL ESTRADIOL 0.02-3(28)
1 KIT ORAL DAILY
Qty: 84 TABLET | Refills: 4 | Status: SHIPPED | OUTPATIENT
Start: 2024-02-14 | End: 2024-12-30 | Stop reason: ALTCHOICE

## 2024-02-16 LAB
CLINICAL INFO: NORMAL
CYTO CVX: NORMAL
CYTOLOGIST CVX/VAG CYTO: NORMAL
CYTOLOGIST CVX/VAG CYTO: NORMAL
CYTOLOGY CMNT CVX/VAG CYTO-IMP: NORMAL
DATE OF PREVIOUS PAP: NORMAL
DATE PREVIOUS BX: NORMAL
HPV E6+E7 MRNA CVX QL NAA+PROBE: NOT DETECTED
LMP START DATE: NORMAL
QUEST COMMENT (PAP): NORMAL
SPECIMEN SOURCE CVX/VAG CYTO: NORMAL
STAT OF ADQ CVX/VAG CYTO-IMP: NORMAL

## 2024-03-04 ENCOUNTER — HOSPITAL ENCOUNTER (OUTPATIENT)
Facility: CLINIC | Age: 34
Discharge: HOME | End: 2024-03-04
Attending: FAMILY MEDICINE
Payer: COMMERCIAL

## 2024-03-04 VITALS
SYSTOLIC BLOOD PRESSURE: 107 MMHG | OXYGEN SATURATION: 99 % | HEART RATE: 73 BPM | DIASTOLIC BLOOD PRESSURE: 72 MMHG | TEMPERATURE: 98.2 F

## 2024-03-04 DIAGNOSIS — J02.9 ACUTE PHARYNGITIS, UNSPECIFIED ETIOLOGY: Primary | ICD-10-CM

## 2024-03-04 LAB
EXPIRATION DATE: NORMAL
Lab: NORMAL
POCT MANUFACTURER: NORMAL
S PYO AG THROAT QL: NEGATIVE

## 2024-03-04 PROCEDURE — 99214 OFFICE O/P EST MOD 30 MIN: CPT | Performed by: NURSE PRACTITIONER

## 2024-03-04 PROCEDURE — 87880 STREP A ASSAY W/OPTIC: CPT | Mod: QW | Performed by: NURSE PRACTITIONER

## 2024-03-04 ASSESSMENT — ENCOUNTER SYMPTOMS
CHILLS: 0
VOMITING: 0
HEADACHES: 0
ABDOMINAL PAIN: 0
DIZZINESS: 0
FEVER: 0
COUGH: 0
NAUSEA: 0
SORE THROAT: 1

## 2024-03-04 NOTE — ED PROVIDER NOTES
Emergency Medicine Note  HPI   HISTORY OF PRESENT ILLNESS     HPI Pt is a 33 year old female, who presents with sore throat with body aches that began today. States has had nasal congestion and post nasal drip. Hx of tonsillectomy.  No drooling. Able to manage secretions. Denies otalgia,cough, cp, sob, abdominal pain, n/v, urine or bowel changes, rash.      Patient History   PAST HISTORY     Reviewed from Nursing Triage:  Tobacco  Allergies  Meds  Problems  Med Hx  Surg Hx  Fam Hx  Soc   Hx      Past Medical History:   Diagnosis Date   • Anxiety     used to see a therapist, previously on lexapro   • BPPV (benign paroxysmal positional vertigo)    • H/O human papillomavirus infection    • Ovarian cyst        Past Surgical History:   Procedure Laterality Date   •  SECTION  2021   •  SECTION     • TONSILLECTOMY & ADENOIDECTOMY         Family History   Problem Relation Age of Onset   • Cancer Paternal Grandmother    • Lung cancer Maternal Grandmother         smoker   • Coronary artery disease Maternal Grandfather    • Liver cancer Biological Father    • Cirrhosis Biological Father    • Coronary artery disease Biological Father    • Heart failure Biological Father    • Stroke Biological Father    • Diabetes Biological Father    • No Known Problems Biological Mother    • No Known Problems Biological Brother    • No Known Problems Biological Brother    • No Known Problems Biological Sister    • No Known Problems Biological Sister    • Cervical cancer Father's Sister    • Liver cancer Father's Brother    • Coronary artery disease Father's Brother    • Prostate cancer Father's Brother    • Breast cancer Cousin         Paternal cousin   • Colon cancer Neg Hx        Social History     Tobacco Use   • Smoking status: Never   • Smokeless tobacco: Never   Substance Use Topics   • Alcohol use: Yes     Comment: 1 drink/wk. ; special occ.   • Drug use: Never         Review of Systems   REVIEW OF  SYSTEMS     Review of Systems   Constitutional: Negative for chills and fever.   HENT: Positive for congestion and sore throat.    Respiratory: Negative for cough.    Cardiovascular: Negative for chest pain.   Gastrointestinal: Negative for abdominal pain, nausea and vomiting.   Neurological: Negative for dizziness and headaches.   All other systems reviewed and are negative.        VITALS     ED Vitals    Date/Time Temp Pulse Resp BP SpO2 [facility]   03/04/24 1849 36.8 °C (98.2 °F) 73 -- 107/72 99 % ALB                       Physical Exam   PHYSICAL EXAM     Physical Exam  Vitals and nursing note reviewed.   Constitutional:       General: She is not in acute distress.     Appearance: Normal appearance. She is well-developed and well-groomed. She is not ill-appearing.   HENT:      Head: Normocephalic and atraumatic.      Right Ear: Hearing, tympanic membrane, ear canal and external ear normal.      Left Ear: Hearing, tympanic membrane, ear canal and external ear normal.      Nose: Congestion present.      Mouth/Throat:      Lips: Pink.      Mouth: Mucous membranes are moist.      Palate: No mass and lesions.      Pharynx: Posterior oropharyngeal erythema present. No pharyngeal swelling, oropharyngeal exudate or uvula swelling.      Comments: +PND. Absent tonsils.   Cardiovascular:      Rate and Rhythm: Normal rate and regular rhythm.      Pulses: Normal pulses.      Heart sounds: Normal heart sounds, S1 normal and S2 normal.   Pulmonary:      Effort: Pulmonary effort is normal.      Breath sounds: Normal breath sounds and air entry.   Musculoskeletal:      Cervical back: Normal range of motion and neck supple.   Lymphadenopathy:      Cervical: No cervical adenopathy.   Skin:     General: Skin is warm and dry.      Findings: No rash.   Neurological:      Mental Status: She is alert and oriented to person, place, and time.   Psychiatric:         Attention and Perception: Attention normal.         Mood and Affect: Mood  normal.           PROCEDURES     Procedures     DATA                                       ED Course & MDM   MDM / ED COURSE / CLINICAL IMPRESSION / DISPO     MDM    Pt has 1/4 Centor critieria indicating less likelihood of Strep infection. TM's WNL. Rapid strep negative, culture sent. Likely viral in etiology. Covid viral swab pending. Recc supportive care. PE findings evident and non-consistent with RPA. No peritonsillar fluctuance or erythema and pt is managing secretions well which is non-suggestive of PTA. Recc Ibuprofen/Tylenol PRN. Strict ED precautions provided. Pt agreeable with plan of care.      Clinical Impression      Acute pharyngitis, unspecified etiology     _________________     ED Disposition   Discharge                   Eulalia Neumann CRNP  03/04/24 1911

## 2024-03-05 ENCOUNTER — TELEPHONE (OUTPATIENT)
Dept: PRIMARY CARE | Facility: CLINIC | Age: 34
End: 2024-03-05
Payer: COMMERCIAL

## 2024-03-05 NOTE — TELEPHONE ENCOUNTER
[facility] Appointment Request   Provider: Dr. Bermudez   Appointment Type: NPV  Reason for Visit: EST CARE   Available Day and Time:   Best Contact Number: [phone]     The practice will reach out to schedule your appointment within the next 2 business days.

## 2024-03-05 NOTE — DISCHARGE INSTRUCTIONS
Your sore throat is most likely viral and symptomatic treatment is recommended.  ?  COVID/FLU /RSV pending.     Rapid strep test is negative. We sent the throat swab for culture, we will call once resulted.  ?  You may take Tylenol or Ibuprofen as needed for fever and pain. Salt warm garggles.  ?  Increase your water intake and rest.  ?  Please let us know if you are not feeling better in the next 2-3 days.  ?  If your condition worsens and especially if you develop fever greater than 101.4, please let our office.  ?  Any worsening symptoms such as difficulty breathing, unable to swallow your saliva (drooling), swelling of your tongue, or are having difficulty turning your head or opening your mouth go to the ER.  ?

## 2024-03-05 NOTE — ED ATTESTATION NOTE
I was immediately available to provide supervision and direction for the care of the patient.    The patient was evaluated and managed by the nurse practitioner.       Myles Alvares DO  03/04/24 2001     TERESE URIAS  : 1957 14:[birth year]:05  ACCOUNT:  [id number]  HOME PHONE:  [phone]  WORK PHONE:  [phone]    Phone conversation: Pt wife Tasia Hernandez and pt Nils called to report Dr. Quevedo notified the results of event monitor. He informed Nils that he shouldn't be taking Flecanide. Pt palpitations have improved on Flecanide, but he should have an ablation. Pt does not want an ablation. No dizziness, BP better controlled. No chest pains or dyspnea. Pt able to exercise. CPM. OV in December.   /70, HR 60s    garrick PATRICK

## 2024-03-06 LAB
FLUAV RNA RESP QL NAA+PROBE: NOT DETECTED
FLUBV RNA RESP QL NAA+PROBE: NOT DETECTED
RSV RNA RESP QL NAA+PROBE: NOT DETECTED
S PYO THROAT QL CULT: NORMAL
SARS-COV-2 RNA RESP QL NAA+PROBE: NOT DETECTED

## 2024-04-04 ENCOUNTER — OFFICE VISIT (OUTPATIENT)
Dept: BEHAVIORAL HEALTH | Facility: CLINIC | Age: 34
End: 2024-04-04
Payer: COMMERCIAL

## 2024-04-04 DIAGNOSIS — F41.1 GENERALIZED ANXIETY DISORDER: Primary | ICD-10-CM

## 2024-04-04 DIAGNOSIS — F33.0 MILD EPISODE OF RECURRENT MAJOR DEPRESSIVE DISORDER (CMS/HCC): ICD-10-CM

## 2024-04-04 PROCEDURE — 90791 PSYCH DIAGNOSTIC EVALUATION: CPT | Performed by: PSYCHOLOGIST

## 2024-04-04 ASSESSMENT — COGNITIVE AND FUNCTIONAL STATUS - GENERAL
PERCEPTUAL FUNCTION: NORMAL
THOUGHT_CONTENT: APPROPRIATE
ORIENTATION: FULLY ORIENTED
ATTENTION: WNL
EYE_CONTACT: WNL
REMOTE MEMORY: WNL
RECENT MEMORY: WNL
SPEECH: REGULAR
AROUSAL LEVEL: ALERT
PSYCHOMOTOR FUNCTIONING: WNL
MOOD: ANXIOUS;MOTIVATED
AFFECT: FULL RANGE;TEARFUL
THOUGHT_PROCESS: WORRY;WNL
CONCENTRATION: WNL
IMPULSE CONTROL: INTACT
SLEEP_WAKE_CYCLE: DECREASED
APPETITE: DECREASED
INSIGHT: INTACT
APPEARANCE: WELL GROOMED

## 2024-04-04 NOTE — PROGRESS NOTES
Integrated Behavioral Health Initial Visit    Visit Type Performed: In-office     Rosamaria Sherman presented today for a behavioral health visit.    Clinician confirmed identification of patient by name and birthdate.      Informed Consent/Confidentiality:   Pt was explained the model of primary care behavioral health we provide at [facility], including the model of care, documentation visibility, and confidentiality:    Model of Care: This is a low-intensity model of care (we provide 8-10 visits of cognitive-behavioral therapy), and the patient has the right to other options of behavioral health care that are indicated for more severe conditions (i.e.: Traditional Outpatient psychotherapy, Intensive Outpatient Programs, Partial Hospitalization Programs, and Inpatient services).    Documentation: The psychologist/licensed behavioral health provider collaborates regularly with the pt’s PCP regarding the pt’s treatment. The integrated behavioral health progress notes are visible to the physicians and advanced practitioners in the practice where the pt is being seen, [facility] (S) for continuity of care if pts choose to pursue medium-term therapy through [facility], in addition to [facility]'s billing and compliance departments, as needed.     The visit diagnosis and appointment/scheduling information is visible to the patient's EPIC chart, to provide continuity of care across the [facility] system. The pt will also have access to view their notes via [facility] (pt portal).    Confidentiality: Also discussed were confidentiality and the limits of confidentiality. Information shared by the patient with the undersigned provider are kept confidential, unless the patient makes an informed written request for to have their information shared with a specific party, or if a  mandates the release of information via a court order. If the patient is at imminent risk of suicide or homicide healthcare providers (including  "psychologists and therapists) may need to break confidentiality to ensure the safety of the patient or others (ie: to engage emergency services). If child, elder, or other vulnerable population abuse or neglect is reported, your healthcare providers must follow mandated reporting requirements.    Patient was given the opportunity to ask clarifying questions, and they expressed understanding and consent: Yes        SUBJECTIVE     History (as relevant to visit diagnosis, presenting problem, or treatment)  Referred by Zahraa Guerrero PA-C due to anxiety  Dad passed away 2023  --she was one of his main caregivers since  when he first got sick  --TIAs, heart failure, dementia,  of pneumonia     Feels like no real time to grieve  She has been noticing \"rage,\" more easily irritated or upset. Short-fuse    Mother moved back to the area from [place] right before dad passed away; lives with Diana's sister but is very healthy     \"I don't want to pass my anxiety on to my kids.\"   Her anxiety looks like:   -procrastination/avoidance, \"if it can't be done fully or right then it won't get done at all.\"   -over-thinking  -self-criticism   -fear of dying, leaving her kids without a mom  -social discomfort (hx of near agoraphobia)  -people pleasing  -trouble making choices with too many options    Enjoys being at home. Struggles with social/peer comparisons. \"Shuts down\"       History of Behavioral Health Treatment  Previous treatment: therapy and psychiatry interventions. Good experience in therapy at age 23-24, also on medication at the time. She tried to connect with therapy during her pregnancy, and had a negative experience on intake at [facility].   Previously experienced symptoms of: anxiety and depression      Substance Use Screening  alcohol use: minimal use  drugs:  once in a while marijuana edible to help sleep ;   tobacco: denied;   caffeine: coffee 2-3 cups /day      Social History  Important people in pt's " life/Support network: good support system; close with her sister. Good girlfriends.   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       Reported Symptoms  PHQ 9:  Little Interest or Pleasure in Doing Things: 0-->not at all    Feeling Down, Depressed or Hopeless: 1-->several days    Trouble Falling or Staying Asleep, or Sleeping Too Much: 1-->several days    Feeling Tired or Having Little Energy: 2-->more than half the days    Poor Appetite or Overeatin-->more than half the days    Feeling Bad about Yourself - or that You are a Failure or Have Let Yourself or Your Family Down: 1-->several days    Trouble Concentrating on Things, Such as Reading the Newspaper or Watching Television: 2-->more than half the days    Moving or Speaking So Slowly that Other People Could Have Noticed? Or the Opposite - Being So Fidgety: 0-->not at all    Thoughts that You Would be Better Off Dead or of Hurting Yourself in Some Way: 0-->not at all    PHQ-9: Brief Depression Severity Measure Score: 9    If You Checked Off Any Problems, How Difficult Have These Problems Made It For You to Do Your Work, Take Care of Things at Home, or Get Along with Other People?: not difficult at all          TONEY-7  Feeling nervous, anxious or on edge: 1-->Several days    Not being able to stop or control worryin-->Several days    Worrying too much about different things: 1-->Several days    Trouble relaxin-->More than half the days    Being so restless that it is hard to sit still: 0-->Not at all    Becoming easily annoyed or irritable: 1-->Several  days    Feeling afraid as if something awful might happen: 1-->Several days    GAD7 Total Score: : 7    If you checked off any problems, how difficult have these made it for you to do your work, take care of things at home, or get along with other people?: Not difficult at all        OBJECTIVE     Mental Status Exam  Appearance: Well Groomed  Speech: Regular  Psychomotor Functioning: WNL  Eye Contact: WNL  Orientation: Fully oriented  Attention: WNL  Concentration: WNL  Recent Memory: WNL  Remote Memory: WNL  Thought Content: Appropriate  Thought Process: Worry, WNL  Insight: Intact  Perceptual Function: Normal  Sleeping: Decreased  Appetite: Decreased  Affect: Full Range, Tearful  Mood: Anxious, Motivated      ASSESSMENT     Psychotropic medications:  , N/A   Current Outpatient Medications   Medication Sig Dispense Refill    drospirenone-ethinyl estradioL (TONNY,GIANVI) 3-0.02 mg per tablet Take 1 tablet by mouth daily. 84 tablet 4     No current facility-administered medications for this visit.         Suicidal Ideation/Homicidal Ideation Risk Assessment  Risk Factors: Loss (relational, social, work, or financial)  Protective factors: Effective and accessible mental health care , Connectedness to individuals, family, community, and social institutions, and Cultural/Confucianist beliefs that discourage suicide    Suicidal Ideation: Not Present, No intention or plan.  Self Injurious Behavior:  Not Present  Homicidal Ideation: Not Present  Estimate of Current Risk: Minimal risk    Plan for Safety-   N/A:  Risk is assessed to be minimal; therefore, developing a safety plan is not indicated at this time.      Screening measures administered during this visit  PHQ-9: Brief Depression Severity Measure Score: 9  GAD7 Total Score: : 7  AUDITC: 2 (very low risk alcohol use)  DAST3: 1 (occasional marijuana products; no illicit substance use)  PC-PTSD: 3 (thoughts and guilt re: dad's death)  STAT: 0 (no IPV hx)    ASSESSMENT /  IMPRESSIONS    Rosamaria Sherman seems to be experiencing generalized anxiety disorder and major depressive disorder (by history). Diana has dealt with perfectionist tendencies and social anxieties throughout much of her life, with symptoms most recently exacerbated by bereavement for her father. In the months since the death of her father, she has noticed herself feeling more on edge, impatient, and irritable, which causes her concerns based on wanting to be present, patient, and kind with her two young children. She is eager to learn new ways of coping to improve emotional regulation and heal from her grief.   Prognostic protective factors include self-awareness, happy in her marriage, and good friends.     PLAN     Goals:  1.  Learn and apply adaptive coping skills  2.  Improve self-compassion; permission to be imperfect  3. Heal through grief       Recommendations for treatment: Individual Therapy, 1 hour  2 times monthly  She expressed a preference for traditional outpatient psychotherapy with greater frequency and longer visits than [facility] can offer. I recommended [facility] and other providers who accept [facility] insurance.       Recommendations for Interventions: Anxiety Reduction Techniques and Cognitive Behavior Therapy    Next visit plan  Cognitive triangle  Unhelpful thinking styles    I spent  45 minutes on this date of service performing the following activities: obtaining history and providing counseling and education.

## 2024-06-11 ENCOUNTER — TELEPHONE (OUTPATIENT)
Dept: PRIMARY CARE | Facility: CLINIC | Age: 34
End: 2024-06-11
Payer: COMMERCIAL

## 2024-06-11 NOTE — TELEPHONE ENCOUNTER
"I saw when reviewing Zahraa's schedule that this pt is coming in on 6/24 for \"Chest discomfort for over a month. Off and on in back behind rib cage.\"  Can someone please triage to ensure that this is clinically appropriate?   "

## 2024-06-12 NOTE — TELEPHONE ENCOUNTER
RN called and spoke to pt, pt reports chest discomfort x1 month, sometimes pain radiates to her back. Pt states the pain was constant at first, started under her Left rib like a pulled muscle, pain went away, now the pain is on the sternum, pain is intermittent. Pt states the pain feels more like a pulled muscle and discomfort. Pt denies SOB, difficulty breathing, or trauma to chest, pt does have young kids at home. RN offered pt sooner SDS appt tomorrow or Friday, pt declined due to . ED precautions given, pt states she will go to ER if pain becomes severe and will call our office if anything changes. Pt will keep 6/25 appt.

## 2024-06-25 ENCOUNTER — OFFICE VISIT (OUTPATIENT)
Dept: PRIMARY CARE | Facility: CLINIC | Age: 34
End: 2024-06-25
Payer: COMMERCIAL

## 2024-06-25 VITALS
WEIGHT: 128.6 LBS | SYSTOLIC BLOOD PRESSURE: 112 MMHG | BODY MASS INDEX: 22.79 KG/M2 | OXYGEN SATURATION: 98 % | TEMPERATURE: 97.8 F | RESPIRATION RATE: 15 BRPM | HEIGHT: 63 IN | HEART RATE: 99 BPM | DIASTOLIC BLOOD PRESSURE: 80 MMHG

## 2024-06-25 DIAGNOSIS — N64.4 BREAST PAIN: ICD-10-CM

## 2024-06-25 DIAGNOSIS — R07.81 RIB PAIN: ICD-10-CM

## 2024-06-25 DIAGNOSIS — Z80.9 FAMILY HISTORY OF CANCER: ICD-10-CM

## 2024-06-25 DIAGNOSIS — R07.89 CHEST DISCOMFORT: Primary | ICD-10-CM

## 2024-06-25 PROCEDURE — 3008F BODY MASS INDEX DOCD: CPT | Performed by: PHYSICIAN ASSISTANT

## 2024-06-25 PROCEDURE — 93000 ELECTROCARDIOGRAM COMPLETE: CPT | Performed by: PHYSICIAN ASSISTANT

## 2024-06-25 PROCEDURE — 99214 OFFICE O/P EST MOD 30 MIN: CPT | Performed by: PHYSICIAN ASSISTANT

## 2024-06-25 ASSESSMENT — ENCOUNTER SYMPTOMS
SINUS PAIN: 0
FATIGUE: 1
SHORTNESS OF BREATH: 0
LIGHT-HEADEDNESS: 1
VOMITING: 0
DIAPHORESIS: 1
SINUS PRESSURE: 0
FEVER: 0
PALPITATIONS: 0
SORE THROAT: 0
CHILLS: 0
WHEEZING: 0
ARTHRALGIAS: 1
COUGH: 0
NUMBNESS: 1
MYALGIAS: 1
WEAKNESS: 0
NAUSEA: 1

## 2024-06-25 NOTE — PATIENT INSTRUCTIONS
Track the breast discomfort over the next few weeks and if persisting message me and I can order a diagnostic mammo and ultrasound.

## 2024-06-25 NOTE — PROGRESS NOTES
[facility]  MARGOTH Golden C  [address], [address]  [place] [address]  P: [phone]    F: [phone]       Patient:  Rosamaria Sherman  :  1990  MRN#:  [id number]  2024        HPI:    Rosamaria Sherman is a 33 y.o. female who presents today for chest pain x 1 month    Pt. C/o chest pain behind the rib cage, on and off x 1 month.    Pt. C/o sternum is tender to touch and the rib cage is tender to touch for multiple months (5 months).     Pt. C/o faint aching in bilat. Breasts x 1 month.     Pt. C/o night sweats over the last 1-2 months, not every night.   Pt. Admits to slightly decreased energy level.      Pt. C/o numbness and tingling of bilat. LE that was happening for a few weeks and then resolved.     Pt. C/o int. Lightheadedness.     Denies fever, chills.   Sleeping ok at night.     Denies coughing, wheezing, SOB.  Denies palpitations, ankle edema.      Health Maintenance Due   Topic Date Due    COVID-19 Vaccine (1 - 2023-24 season) Never done         Current Outpatient Medications on File Prior to Visit   Medication Sig Dispense Refill    drospirenone-ethinyl estradioL (TONNY,GIANVI) 3-0.02 mg per tablet Take 1 tablet by mouth daily. 84 tablet 4     No current facility-administered medications on file prior to visit.         No Known Allergies     Past Medical History:   Diagnosis Date    Anxiety     used to see a therapist, previously on lexapro    BPPV (benign paroxysmal positional vertigo)     H/O human papillomavirus infection     Ovarian cyst          Past Surgical History:   Procedure Laterality Date     SECTION  2021     SECTION      TONSILLECTOMY & ADENOIDECTOMY           Family History   Problem Relation Age of Onset    Cancer Paternal Grandmother     Lung cancer Maternal Grandmother         smoker    Coronary artery disease Maternal Grandfather     Liver cancer Biological Father     Cirrhosis  Biological Father     Coronary artery disease Biological Father     Heart failure Biological Father     Stroke Biological Father     Diabetes Biological Father     No Known Problems Biological Mother     No Known Problems Biological Brother     No Known Problems Biological Brother     No Known Problems Biological Sister     No Known Problems Biological Sister     Cervical cancer Father's Sister     Liver cancer Father's Brother     Coronary artery disease Father's Brother     Prostate cancer Father's Brother     Breast cancer Cousin         Paternal cousin    Colon cancer Neg Hx          Social History     Socioeconomic History    Marital status:      Spouse name: Not on file    Number of children: Not on file    Years of education: Not on file    Highest education level: Not on file   Occupational History    Not on file   Tobacco Use    Smoking status: Never    Smokeless tobacco: Never   Substance and Sexual Activity    Alcohol use: Yes     Comment: 1 drink/wk. ; special occ.    Drug use: Never    Sexual activity: Yes     Partners: Male     Birth control/protection: OCP   Other Topics Concern    Not on file   Social History Narrative        Children: 2 boys, 2y/o and 4y/o    Occupation: stay at home mom, used to be a teacher    Caffeine: coffee, 2 cups/d     Social Determinants of Health     Financial Resource Strain: Not on file   Food Insecurity: No Food Insecurity (12/3/2022)    Hunger Vital Sign     Worried About Running Out of Food in the Last Year: Never true     Ran Out of Food in the Last Year: Never true   Transportation Needs: Not on file   Physical Activity: Not on file   Stress: Not on file   Social Connections: Not on file   Intimate Partner Violence: Not on file   Housing Stability: Not on file         Review of Systems   Constitutional:  Positive for diaphoresis and fatigue. Negative for chills and fever.   HENT:  Positive for postnasal drip. Negative for congestion, ear discharge, ear  "pain, sinus pressure, sinus pain and sore throat.         -crackling in the right ear   Respiratory:  Negative for cough, shortness of breath and wheezing.    Cardiovascular:  Positive for chest pain. Negative for palpitations and leg swelling.   Gastrointestinal:  Positive for nausea. Negative for vomiting.   Musculoskeletal:  Positive for arthralgias and myalgias.   Neurological:  Positive for light-headedness and numbness. Negative for weakness.        -int. Lightheadedness with position changes and sometimes assoc. With nausea/dizziness           Vitals:    06/25/24 1502   BP: 112/80   BP Location: Right upper arm   Patient Position: Sitting   Pulse: 99   Resp: 15   Temp: 36.6 °C (97.8 °F)   TempSrc: Temporal   SpO2: 98%   Weight: 58.3 kg (128 lb 9.6 oz)   Height: 1.6 m (5' 3\")     Body mass index is 22.78 kg/m².    Physical Exam  Vitals reviewed.   Constitutional:       General: She is not in acute distress.     Appearance: Normal appearance. She is normal weight. She is not ill-appearing, toxic-appearing or diaphoretic.   Neck:      Thyroid: No thyroid mass or thyromegaly.   Cardiovascular:      Rate and Rhythm: Normal rate and regular rhythm.      Pulses:           Carotid pulses are 2+ on the right side and 2+ on the left side.       Radial pulses are 2+ on the right side and 2+ on the left side.        Posterior tibial pulses are 2+ on the right side and 2+ on the left side.      Heart sounds: Normal heart sounds, S1 normal and S2 normal. No murmur heard.     Comments: No carotid bruits bilat.   Pulmonary:      Effort: Pulmonary effort is normal.      Breath sounds: Normal breath sounds. No decreased breath sounds, wheezing, rhonchi or rales.   Chest:      Chest wall: Tenderness present.   Breasts:     Right: Normal. No swelling, bleeding, inverted nipple, mass, nipple discharge, skin change or tenderness.      Left: Normal. No swelling, bleeding, inverted nipple, mass, nipple discharge, skin change or " tenderness.      Comments: Tenderness to palpation of the sternum and lower anterior ribs.    Abdominal:      General: Bowel sounds are normal. There is no distension.      Palpations: Abdomen is soft. There is no hepatomegaly, splenomegaly or mass.      Tenderness: There is no abdominal tenderness. There is no right CVA tenderness, left CVA tenderness, guarding or rebound. Negative signs include Lyle's sign.   Musculoskeletal:      Right lower leg: No edema.      Left lower leg: No edema.   Lymphadenopathy:      Cervical: No cervical adenopathy.      Upper Body:      Right upper body: No axillary adenopathy.      Left upper body: No axillary adenopathy.   Neurological:      General: No focal deficit present.      Mental Status: She is alert and oriented to person, place, and time.   Psychiatric:         Mood and Affect: Mood normal.         Behavior: Behavior normal.         Thought Content: Thought content normal.         Judgment: Judgment normal.            Lab Results   Component Value Date    WBC 5.1 01/06/2024    HGB 12.9 01/06/2024    HCT 39.5 01/06/2024     01/06/2024    CHOL 172 01/06/2024    TRIG 86 01/06/2024    HDL 74 01/06/2024    LDLCALC 81 01/06/2024    ALT 14 01/06/2024    AST 17 01/06/2024     01/06/2024    K 4.2 01/06/2024    GLUCOSE 79 01/06/2024     01/06/2024    CREATININE 0.72 01/06/2024    BUN 15 01/06/2024    CO2 28 01/06/2024    TSH 1.98 01/06/2024    HGBA1C 4.8 01/06/2024    EGFR 112 01/06/2024                ECG 12 LEAD OFFICE PERFORMED    Date/Time: 6/25/2024 3:29 PM    Performed by: Cele Guerrero PA C  Authorized by: Cele Guerrero PA C    Interpretation:     Interpretation: normal    Rate:     ECG rate:  78    ECG rate assessment: normal    Rhythm:     Rhythm: sinus rhythm    Ectopy:     Ectopy: none    QRS:     QRS axis:  Normal    QRS intervals:  Normal    QRS conduction: normal    ST segments:     ST segments:  Normal  T waves:     T waves: normal     Q waves:     Abnormal Q-waves: not present          Assessment and Plan:  Diagnoses and all orders for this visit:    Chest discomfort (Primary)  -     ECG 12 LEAD OFFICE PERFORMED  -     X-RAY CHEST 2 VIEWS; Future  - EKG is stable  - Check CXR for further eval  - Tylenol/motrin prn  - Limit heavy lifting and any upper body exercises.    Rib pain  -     X-RAY CHEST 2 VIEWS; Future    Breast pain  -Track the breast discomfort over the next few weeks and if persisting message me and I can order a diagnostic mammo and ultrasound.     Family history of cancer  -     Ambulatory Referral to Adult Genetics [facility]; Future          Patient acknowledged understanding and agreement with the above treatment plan as well as reasons for seeking more immediate follow-up care.           Orders Placed This Encounter   Procedures    X-RAY CHEST 2 VIEWS    Ambulatory Referral to Adult Genetics [facility]    ECG 12 LEAD OFFICE PERFORMED                  In 6 months MARGOTH Golden [facility]                MARGOTH Golden      I spent 30 minutes on this date of service performing the following activities: obtaining history, performing examination, entering orders, documenting, preparing for visit, obtaining / reviewing records, and providing counseling and education.

## 2024-07-02 ENCOUNTER — HOSPITAL ENCOUNTER (OUTPATIENT)
Dept: RADIOLOGY | Age: 34
Discharge: HOME | End: 2024-07-02
Attending: PHYSICIAN ASSISTANT
Payer: COMMERCIAL

## 2024-07-02 DIAGNOSIS — R07.81 RIB PAIN: ICD-10-CM

## 2024-07-02 DIAGNOSIS — R07.89 CHEST DISCOMFORT: ICD-10-CM

## 2024-07-02 PROCEDURE — 71046 X-RAY EXAM CHEST 2 VIEWS: CPT

## 2024-07-11 ENCOUNTER — TELEPHONE (OUTPATIENT)
Dept: GENETICS | Facility: HOSPITAL | Age: 34
End: 2024-07-11
Payer: COMMERCIAL

## 2024-07-11 NOTE — TELEPHONE ENCOUNTER
I called Rosamaria Sherman regarding a referral for cancer genetic counseling appointment from MARGOTH Nance. I got her scheduled for 9/11 at .

## 2024-07-25 ENCOUNTER — APPOINTMENT (OUTPATIENT)
Dept: URBAN - METROPOLITAN AREA CLINIC 203 | Age: 34
Setting detail: DERMATOLOGY
End: 2024-07-31

## 2024-07-25 DIAGNOSIS — D18.0 HEMANGIOMA: ICD-10-CM

## 2024-07-25 DIAGNOSIS — L57.8 OTHER SKIN CHANGES DUE TO CHRONIC EXPOSURE TO NONIONIZING RADIATION: ICD-10-CM

## 2024-07-25 DIAGNOSIS — D22 MELANOCYTIC NEVI: ICD-10-CM

## 2024-07-25 DIAGNOSIS — L81.4 OTHER MELANIN HYPERPIGMENTATION: ICD-10-CM

## 2024-07-25 PROBLEM — D22.5 MELANOCYTIC NEVI OF TRUNK: Status: ACTIVE | Noted: 2024-07-25

## 2024-07-25 PROBLEM — D18.01 HEMANGIOMA OF SKIN AND SUBCUTANEOUS TISSUE: Status: ACTIVE | Noted: 2024-07-25

## 2024-07-25 PROCEDURE — OTHER SUNSCREEN RECOMMENDATIONS: OTHER

## 2024-07-25 PROCEDURE — OTHER REASSURANCE: OTHER

## 2024-07-25 PROCEDURE — 99203 OFFICE O/P NEW LOW 30 MIN: CPT

## 2024-07-25 PROCEDURE — OTHER COUNSELING: OTHER

## 2024-07-25 ASSESSMENT — LOCATION SIMPLE DESCRIPTION DERM
LOCATION SIMPLE: LEFT UPPER BACK
LOCATION SIMPLE: LEFT BREAST
LOCATION SIMPLE: CHEST
LOCATION SIMPLE: ABDOMEN

## 2024-07-25 ASSESSMENT — LOCATION ZONE DERM: LOCATION ZONE: TRUNK

## 2024-07-25 ASSESSMENT — LOCATION DETAILED DESCRIPTION DERM
LOCATION DETAILED: PERIUMBILICAL SKIN
LOCATION DETAILED: LEFT MEDIAL BREAST 11-12:00 REGION
LOCATION DETAILED: LEFT MEDIAL SUPERIOR CHEST
LOCATION DETAILED: LEFT MEDIAL UPPER BACK
LOCATION DETAILED: LEFT MEDIAL BREAST 10-11:00 REGION

## 2024-12-30 ENCOUNTER — OFFICE VISIT (OUTPATIENT)
Dept: PRIMARY CARE | Facility: CLINIC | Age: 34
End: 2024-12-30
Payer: COMMERCIAL

## 2024-12-30 VITALS
BODY MASS INDEX: 24.13 KG/M2 | TEMPERATURE: 97.6 F | OXYGEN SATURATION: 96 % | RESPIRATION RATE: 14 BRPM | SYSTOLIC BLOOD PRESSURE: 116 MMHG | HEART RATE: 32 BPM | HEIGHT: 63 IN | WEIGHT: 136.2 LBS | DIASTOLIC BLOOD PRESSURE: 70 MMHG

## 2024-12-30 DIAGNOSIS — G43.009 MIGRAINE WITHOUT AURA AND WITHOUT STATUS MIGRAINOSUS, NOT INTRACTABLE: ICD-10-CM

## 2024-12-30 DIAGNOSIS — Z13.1 SCREENING FOR DIABETES MELLITUS: ICD-10-CM

## 2024-12-30 DIAGNOSIS — Z00.00 ENCOUNTER FOR GENERAL ADULT MEDICAL EXAMINATION WITHOUT ABNORMAL FINDINGS: Primary | ICD-10-CM

## 2024-12-30 DIAGNOSIS — Z13.220 SCREENING FOR HYPERLIPIDEMIA: ICD-10-CM

## 2024-12-30 PROCEDURE — 99395 PREV VISIT EST AGE 18-39: CPT | Performed by: PHYSICIAN ASSISTANT

## 2024-12-30 PROCEDURE — 3008F BODY MASS INDEX DOCD: CPT | Performed by: PHYSICIAN ASSISTANT

## 2024-12-30 PROCEDURE — 99213 OFFICE O/P EST LOW 20 MIN: CPT | Mod: 25 | Performed by: PHYSICIAN ASSISTANT

## 2024-12-30 RX ORDER — ONDANSETRON 4 MG/1
4 TABLET, ORALLY DISINTEGRATING ORAL EVERY 8 HOURS PRN
Qty: 10 TABLET | Refills: 3 | Status: SHIPPED | OUTPATIENT
Start: 2024-12-30 | End: 2025-01-06

## 2024-12-30 RX ORDER — ZOLMITRIPTAN 5 MG/1
5 TABLET, ORALLY DISINTEGRATING ORAL ONCE AS NEEDED
Qty: 15 TABLET | Refills: 3 | Status: SHIPPED | OUTPATIENT
Start: 2024-12-30 | End: 2025-03-19

## 2024-12-30 ASSESSMENT — ENCOUNTER SYMPTOMS
HEADACHES: 1
BACK PAIN: 0
BLOOD IN STOOL: 0
CHILLS: 0
VOMITING: 1
PALPITATIONS: 0
EYE PAIN: 0
ARTHRALGIAS: 0
APNEA: 0
LIGHT-HEADEDNESS: 0
NERVOUS/ANXIOUS: 0
TREMORS: 0
NECK PAIN: 0
ABDOMINAL PAIN: 0
CONSTIPATION: 0
HEMATURIA: 0
JOINT SWELLING: 0
WEAKNESS: 0
ADENOPATHY: 0
WHEEZING: 0
DECREASED CONCENTRATION: 0
NUMBNESS: 0
SHORTNESS OF BREATH: 0
DIZZINESS: 0
COUGH: 0
MYALGIAS: 0
DYSURIA: 0
FEVER: 0
DIARRHEA: 0
APPETITE CHANGE: 0
FREQUENCY: 0
DIAPHORESIS: 1
NAUSEA: 1
FATIGUE: 0
UNEXPECTED WEIGHT CHANGE: 0
ROS GI COMMENTS: GERD: NO
SLEEP DISTURBANCE: 0
BRUISES/BLEEDS EASILY: 0
DYSPHORIC MOOD: 0
TROUBLE SWALLOWING: 0
SORE THROAT: 0

## 2024-12-30 ASSESSMENT — PATIENT HEALTH QUESTIONNAIRE - PHQ9: SUM OF ALL RESPONSES TO PHQ9 QUESTIONS 1 & 2: 0

## 2024-12-30 NOTE — PATIENT INSTRUCTIONS
Download a migraine tracker abdiel!!!!    OTC Preventative medications:  Vitamin B2 400mg daily  Magnesium 400-600mg daily      Migraine aborting medication:  NSAIDS:  Ibuprofen 600-800mg every 6-8 hours  Aleve 200mg 2 tabs every 12 hours    Triptans:  Imitrex 100mg 1 tab now and repeat in 2 hours if no resolution; up to 2 tabs in 24 hour period  Zomig 2.5mg 1 tab now and repeat in 2 hours if no resolution; up to 2 tabs in 24 hour period  Maxalt 10mg 1 melt now and repeat in 2 hours if no resolution; up to 2 melts in 24 hour period    Take migraine aborting medication as soon as you feel the symptoms of a migraine.      Anti-emetic:  Zofran 4mg every 8 hours as needed for nausea       Please consider flu shot at local pharmacy or diagnostic visit at our office.

## 2024-12-30 NOTE — PROGRESS NOTES
[facility]  [place]  [address], [address]  [place] [address]  P: [phone]    F: [phone]       Patient:  Rosamaria Sherman  :  1990  MRN#:  [id number]  2025        HPI:    Rosamaria Sherman is a 34 y.o. female who presents today for EPP.    Labs 2024:  normal CBC, CMP, lipids, TSH, A1C    Pt. Just started mouthguard 1 week ago.    Needs to schedule eye exam.     Chest wall pain resolved with relaxing on the upper body workouts.      GYN ([facility], Paco Reeves, Jesusita Ni):  S/p colpo, 2023, LSIL, HPV positive  GYN (Monae Vaughn):  scheduled for 3/5/2025      Eye exam:  Awhile, needs to schedule an appt.   Dentist:  Has tried OTC mouth guards and needs to get a mouthguard made  Derm (Dr. Girard/Nichole):  , normal        Health Maintenance Due   Topic Date Due    Influenza Vaccine (1) 2024    COVID-19 Vaccine (2024- season) Never done         Medications Ordered Prior to Encounter[1]      No Known Allergies     Past Medical History:   Diagnosis Date    Anxiety     used to see a therapist, previously on lexapro    BPPV (benign paroxysmal positional vertigo)     H/O human papillomavirus infection     Ovarian cyst          Past Surgical History   Procedure Laterality Date     section  2021     section       SECTION N/A 2022    Performed by Jesusita Ni [facility] at [facility]     SECTION Bilateral 2021    Performed by Roxann Marquis DO at [facility]    Tonsillectomy & adenoidectomy  2012         Family History   Problem Relation Name Age of Onset    No Known Problems Biological Mother      Liver cancer Biological Father      Cirrhosis Biological Father      Coronary artery disease Biological Father      Heart failure Biological Father      Stroke Biological Father      Diabetes Biological Father      No Known Problems Biological Sister      Migraines  Biological Sister Riya     No Known Problems Biological Brother      No Known Problems Biological Brother      Lung cancer Maternal Grandmother          smoker    Coronary artery disease Maternal Grandfather      Cancer Paternal Grandmother      Cervical cancer Father's Sister      Liver cancer Father's Brother      Coronary artery disease Father's Brother      Prostate cancer Father's Brother      Breast cancer Cousin          Paternal cousin    Colon cancer Neg Hx           Social History     Socioeconomic History    Marital status:      Spouse name: Not on file    Number of children: Not on file    Years of education: Not on file    Highest education level: Not on file   Occupational History    Not on file   Tobacco Use    Smoking status: Never    Smokeless tobacco: Never   Vaping Use    Vaping status: Never Used   Substance and Sexual Activity    Alcohol use: Yes     Comment: 1 drink/wk. ; special occ.    Drug use: Never    Sexual activity: Yes     Partners: Male     Birth control/protection: OCP   Other Topics Concern    Not on file   Social History Narrative        Children: 2 boys, 2y/o and 4y/o    Occupation: stay at home mom, used to be a teacher    Caffeine: coffee, 2 cups/d     Social Drivers of Health     Financial Resource Strain: Not on file   Food Insecurity: No Food Insecurity (12/3/2022)    Hunger Vital Sign     Worried About Running Out of Food in the Last Year: Never true     Ran Out of Food in the Last Year: Never true   Transportation Needs: Not on file   Physical Activity: Not on file   Stress: Not on file   Social Connections: Not on file   Intimate Partner Violence: Not on file   Housing Stability: Not on file         Review of Systems   Constitutional:  Positive for diaphoresis. Negative for appetite change, chills, fatigue, fever and unexpected weight change.        -night sweats, for the last few months; warm at night, shirt damp   HENT:  Negative for congestion, ear  pain, hearing loss, nosebleeds, postnasal drip, sore throat and trouble swallowing.    Eyes:  Negative for pain and visual disturbance.   Respiratory:  Negative for apnea, cough, shortness of breath and wheezing.    Cardiovascular:  Negative for chest pain, palpitations and leg swelling.        -fast heart beats at night, more with waking up at night   Gastrointestinal:  Positive for nausea and vomiting. Negative for abdominal pain, blood in stool, constipation and diarrhea.        GERD: no   Genitourinary:  Negative for dysuria, frequency and hematuria.        Nocturia:  none  LMP:  Adelia week; heavy for 1-2 days them moderate/light, 6 day flow, regular   Musculoskeletal:  Negative for arthralgias, back pain, gait problem, joint swelling, myalgias and neck pain.   Skin:  Negative for rash.   Allergic/Immunologic: Negative for environmental allergies and food allergies.   Neurological:  Positive for headaches. Negative for dizziness, tremors, syncope, weakness, light-headedness and numbness.        -headache close to menses and mid-cycle and lasted for 1 day, assoc. With n/v; waking up with headaches in the AM; didn't take anything because hadn't eaten and then headache getting worse  -headaches started in the last few months to be more regular and more intense   -top of the eye and the neck, throbbing, assoc. With nausea/emesis, assoc. With light sensitivity, assoc. With noise sensitivity, no assoc. With numbness/tingling/weakness  -headaches/mo, 2/mo.   -pt. Stopped OCP about 6 months ago.   Hematological:  Negative for adenopathy. Does not bruise/bleed easily.   Psychiatric/Behavioral:  Negative for decreased concentration, dysphoric mood, sleep disturbance and suicidal ideas. The patient is not nervous/anxious.            Vitals:    12/30/24 1419   BP: 116/70   BP Location: Left upper arm   Patient Position: Sitting   Pulse: (!) 32   Resp: 14   Temp: 36.4 °C (97.6 °F)   TempSrc: Temporal   SpO2: 96%  "  Weight: 61.8 kg (136 lb 3.2 oz)   Height: 1.588 m (5' 2.5\")     Body mass index is 24.51 kg/m².    Physical Exam  Vitals reviewed.   Constitutional:       General: She is not in acute distress.     Appearance: Normal appearance. She is normal weight. She is not ill-appearing, toxic-appearing or diaphoretic.   HENT:      Right Ear: Hearing, tympanic membrane, ear canal and external ear normal. No decreased hearing noted. Tympanic membrane is not erythematous or bulging.      Left Ear: Hearing, tympanic membrane, ear canal and external ear normal. No decreased hearing noted. Tympanic membrane is not erythematous or bulging.      Nose:      Right Turbinates: Not enlarged or swollen.      Left Turbinates: Not enlarged or swollen.      Mouth/Throat:      Lips: Pink. No lesions.      Pharynx: Oropharynx is clear. Uvula midline. No pharyngeal swelling, oropharyngeal exudate, posterior oropharyngeal erythema or uvula swelling.      Tonsils: No tonsillar exudate. 1+ on the right. 1+ on the left.   Eyes:      Extraocular Movements: Extraocular movements intact.      Right eye: No nystagmus.      Left eye: No nystagmus.      Conjunctiva/sclera: Conjunctivae normal.      Pupils: Pupils are equal, round, and reactive to light.   Neck:      Thyroid: No thyroid mass or thyromegaly.   Cardiovascular:      Rate and Rhythm: Normal rate and regular rhythm.      Pulses:           Carotid pulses are 2+ on the right side and 2+ on the left side.       Radial pulses are 2+ on the right side and 2+ on the left side.        Posterior tibial pulses are 2+ on the right side and 2+ on the left side.      Heart sounds: Normal heart sounds, S1 normal and S2 normal. No murmur heard.     Comments: No carotid bruits  Pulmonary:      Effort: Pulmonary effort is normal.      Breath sounds: Normal breath sounds. No decreased breath sounds, wheezing, rhonchi or rales.   Abdominal:      General: Bowel sounds are normal. There is no distension.      " Palpations: Abdomen is soft. There is no hepatomegaly, splenomegaly or mass.      Tenderness: There is no abdominal tenderness. There is no guarding or rebound.      Hernia: No hernia is present.   Musculoskeletal:      Right lower leg: No edema.      Left lower leg: No edema.      Comments: FAROM bilat. UE/LE.  5/5 RAMO bilat. UE/LE.    Lymphadenopathy:      Cervical: No cervical adenopathy.   Skin:     General: Skin is warm and dry.      Findings: No rash.   Neurological:      Mental Status: She is alert and oriented to person, place, and time.      Cranial Nerves: No cranial nerve deficit.      Sensory: Sensation is intact. No sensory deficit.      Motor: Motor function is intact.      Coordination: Coordination is intact.      Gait: Gait is intact. Gait normal.      Deep Tendon Reflexes: Reflexes are normal and symmetric.   Psychiatric:         Mood and Affect: Mood normal.         Behavior: Behavior normal.         Thought Content: Thought content normal.         Judgment: Judgment normal.            Lab Results   Component Value Date    WBC 5.8 01/08/2025    HGB 13.7 01/08/2025    HCT 42.3 01/08/2025     01/08/2025    CHOL 191 01/08/2025    TRIG 121 01/08/2025    HDL 80 01/08/2025    LDLCALC 89 01/08/2025    ALT 12 01/08/2025    AST 16 01/08/2025     01/08/2025    K 3.9 01/08/2025    GLUCOSE 90 01/08/2025     01/08/2025    CREATININE 0.72 01/08/2025    BUN 19 01/08/2025    CO2 29 01/08/2025    TSH 2.28 01/08/2025    HGBA1C 5.0 01/08/2025    EGFR 111 01/08/2025         Results for orders placed or performed in visit on 12/30/24   CBC and differential    Collection Time: 01/08/25  9:37 AM   Result Value Ref Range    White Blood Cell Count 5.8 3.8 - 10.8 Thousand/uL    Red Blood Cell Count 4.91 3.80 - 5.10 Million/uL    Hemoglobin 13.7 11.7 - 15.5 g/dL    HEMATOCRIT 42.3 35.0 - 45.0 %    Mcv 86.2 80.0 - 100.0 fL    Mch 27.9 27.0 - 33.0 pg    Mchc 32.4 32.0 - 36.0 g/dL    Rdw 12.4 11.0 - 15.0 %     Platelet Count 261 140 - 400 Thousand/uL    Mpv 10.8 7.5 - 12.5 fL    Absolute Neutrophils 3,376 1,500 - 7,800 cells/uL    Absolute Lymphocytes 1,798 850 - 3,900 cells/uL    Absolute Monocytes 516 200 - 950 cells/uL    Absolute Eosinophils 93 15 - 500 cells/uL    Absolute Basophils 17 0 - 200 cells/uL    Neutrophils 58.2 %    Lymphocytes 31.0 %    Monocytes 8.9 %    Eosinophils 1.6 %    Basophils 0.3 %   Comprehensive metabolic panel    Collection Time: 01/08/25  9:37 AM   Result Value Ref Range    Glucose 90 65 - 99 mg/dL    BUN 19 7 - 25 mg/dL    Creatinine 0.72 0.50 - 0.97 mg/dL    EGFR 111 > OR = 60 mL/min/1.73m2    Bun/Creatinine Ratio SEE NOTE: 6 - 22 (calc)    Sodium 138 135 - 146 mmol/L    Potassium 3.9 3.5 - 5.3 mmol/L    Chloride 103 98 - 110 mmol/L    Carbon Dioxide 29 20 - 32 mmol/L    Calcium 9.6 8.6 - 10.2 mg/dL    Protein, Total 7.0 6.1 - 8.1 g/dL    Albumin 4.7 3.6 - 5.1 g/dL    Globulin 2.3 1.9 - 3.7 g/dL (calc)    Albumin/Globulin Ratio 2.0 1.0 - 2.5 (calc)    Bilirubin, Total 0.5 0.2 - 1.2 mg/dL    Alkaline Phosphatase 53 31 - 125 U/L    Ast 16 10 - 30 U/L    Alt 12 6 - 29 U/L   Hemoglobin A1c    Collection Time: 01/08/25  9:37 AM   Result Value Ref Range    Hemoglobin A1C 5.0 <5.7 % of total Hgb   Lipid panel    Collection Time: 01/08/25  9:37 AM   Result Value Ref Range    Cholesterol, Total 191 <200 mg/dL    Hdl Cholesterol 80 > OR = 50 mg/dL    Triglycerides 121 <150 mg/dL    Ldl-Cholesterol 89 mg/dL (calc)    Chol/Hdlc Ratio 2.4 <5.0 (calc)    Non Hdl Cholesterol 111 <130 mg/dL (calc)   TSH w reflex FT4    Collection Time: 01/08/25  9:37 AM   Result Value Ref Range    TSH W/Reflex To Ft4 2.28 mIU/L          Assessment and Plan:  Diagnoses and all orders for this visit:    Encounter for general adult medical examination without abnormal findings (Primary)  -     CBC and differential; Future  -     Comprehensive metabolic panel; Future  -     Hemoglobin A1c; Future  -     Lipid panel;  Future  -     TSH w reflex FT4; Future  - Please check portal for lab results.  -Please consider flu shot at local pharmacy or diagnostic visit at our office.    Migraine without aura and without status migrainosus, not intractable  -     MRI BRAIN WITH AND WITHOUT CONTRAST; Future  Download a migraine tracker abdiel!!!!    OTC Preventative medications:  Vitamin B2 400mg daily  Magnesium 400-600mg daily      Migraine aborting medication:  NSAIDS:  Ibuprofen 600-800mg every 6-8 hours  Aleve 200mg 2 tabs every 12 hours    Triptans:  Imitrex 100mg 1 tab now and repeat in 2 hours if no resolution; up to 2 tabs in 24 hour period  Zomig 2.5mg 1 tab now and repeat in 2 hours if no resolution; up to 2 tabs in 24 hour period  Maxalt 10mg 1 melt now and repeat in 2 hours if no resolution; up to 2 melts in 24 hour period    Take migraine aborting medication as soon as you feel the symptoms of a migraine.      Anti-emetic:  Zofran 4mg every 8 hours as needed for nausea       Screening for diabetes mellitus  -     Hemoglobin A1c; Future    Screening for hyperlipidemia  -     Lipid panel; Future    Other orders  -     ZOLMitriptan (ZOMIG-ZMT) 5 mg disintegrating tablet; Take 1 tablet (5 mg total) by mouth once as needed for migraine. May repeat in 2 hours if unresolved. Do not exceed 10 mg in 24 hours.  -     ondansetron ODT (ZOFRAN-ODT) 4 mg disintegrating tablet; Take 1 tablet (4 mg total) by mouth every 8 (eight) hours as needed for nausea or vomiting for up to 7 days.          Patient acknowledged understanding and agreement with the above treatment plan as well as reasons for seeking more immediate follow-up care.           Orders Placed This Encounter   Procedures    MRI BRAIN WITH AND WITHOUT CONTRAST    CBC and differential    Comprehensive metabolic panel    Hemoglobin A1c    Lipid panel    TSH w reflex FT4                  In 3 days RH MRI2 [facility] - MRI    In 1 month SAM rBown  OB/GYN in [place]         [1]   No current outpatient medications on file prior to visit.     No current facility-administered medications on file prior to visit.

## 2025-01-09 LAB
ALBUMIN SERPL-MCNC: 4.7 G/DL (ref 3.6–5.1)
ALBUMIN/GLOB SERPL: 2 (CALC) (ref 1–2.5)
ALP SERPL-CCNC: 53 U/L (ref 31–125)
ALT SERPL-CCNC: 12 U/L (ref 6–29)
AST SERPL-CCNC: 16 U/L (ref 10–30)
BASOPHILS # BLD AUTO: 17 CELLS/UL (ref 0–200)
BASOPHILS NFR BLD AUTO: 0.3 %
BILIRUB SERPL-MCNC: 0.5 MG/DL (ref 0.2–1.2)
BUN SERPL-MCNC: 19 MG/DL (ref 7–25)
BUN/CREAT SERPL: NORMAL (CALC) (ref 6–22)
CALCIUM SERPL-MCNC: 9.6 MG/DL (ref 8.6–10.2)
CHLORIDE SERPL-SCNC: 103 MMOL/L (ref 98–110)
CHOLEST SERPL-MCNC: 191 MG/DL
CHOLEST/HDLC SERPL: 2.4 (CALC)
CO2 SERPL-SCNC: 29 MMOL/L (ref 20–32)
CREAT SERPL-MCNC: 0.72 MG/DL (ref 0.5–0.97)
EGFRCR SERPLBLD CKD-EPI 2021: 111 ML/MIN/1.73M2
EOSINOPHIL # BLD AUTO: 93 CELLS/UL (ref 15–500)
EOSINOPHIL NFR BLD AUTO: 1.6 %
ERYTHROCYTE [DISTWIDTH] IN BLOOD BY AUTOMATED COUNT: 12.4 % (ref 11–15)
GLOBULIN SER CALC-MCNC: 2.3 G/DL (CALC) (ref 1.9–3.7)
GLUCOSE SERPL-MCNC: 90 MG/DL (ref 65–99)
HBA1C MFR BLD: 5 % OF TOTAL HGB
HCT VFR BLD AUTO: 42.3 % (ref 35–45)
HDLC SERPL-MCNC: 80 MG/DL
HGB BLD-MCNC: 13.7 G/DL (ref 11.7–15.5)
LDLC SERPL CALC-MCNC: 89 MG/DL (CALC)
LYMPHOCYTES # BLD AUTO: 1798 CELLS/UL (ref 850–3900)
LYMPHOCYTES NFR BLD AUTO: 31 %
MCH RBC QN AUTO: 27.9 PG (ref 27–33)
MCHC RBC AUTO-ENTMCNC: 32.4 G/DL (ref 32–36)
MCV RBC AUTO: 86.2 FL (ref 80–100)
MONOCYTES # BLD AUTO: 516 CELLS/UL (ref 200–950)
MONOCYTES NFR BLD AUTO: 8.9 %
NEUTROPHILS # BLD AUTO: 3376 CELLS/UL (ref 1500–7800)
NEUTROPHILS NFR BLD AUTO: 58.2 %
NONHDLC SERPL-MCNC: 111 MG/DL (CALC)
PLATELET # BLD AUTO: 261 THOUSAND/UL (ref 140–400)
PMV BLD REES-ECKER: 10.8 FL (ref 7.5–12.5)
POTASSIUM SERPL-SCNC: 3.9 MMOL/L (ref 3.5–5.3)
PROT SERPL-MCNC: 7 G/DL (ref 6.1–8.1)
RBC # BLD AUTO: 4.91 MILLION/UL (ref 3.8–5.1)
SODIUM SERPL-SCNC: 138 MMOL/L (ref 135–146)
TRIGL SERPL-MCNC: 121 MG/DL
TSH SERPL-ACNC: 2.28 MIU/L
WBC # BLD AUTO: 5.8 THOUSAND/UL (ref 3.8–10.8)

## 2025-01-29 ENCOUNTER — HOSPITAL ENCOUNTER (OUTPATIENT)
Dept: RADIOLOGY | Facility: HOSPITAL | Age: 35
Discharge: HOME | End: 2025-01-29
Attending: PHYSICIAN ASSISTANT
Payer: COMMERCIAL

## 2025-01-29 DIAGNOSIS — G43.009 MIGRAINE WITHOUT AURA AND WITHOUT STATUS MIGRAINOSUS, NOT INTRACTABLE: ICD-10-CM

## 2025-01-29 PROCEDURE — 70553 MRI BRAIN STEM W/O & W/DYE: CPT

## 2025-01-29 PROCEDURE — A9573: HCPCS

## 2025-01-29 PROCEDURE — A9579 GAD-BASE MR CONTRAST NOS,1ML: HCPCS

## 2025-01-29 RX ADMIN — GADOPICLENOL 5.8 ML: 485.1 INJECTION INTRAVENOUS at 16:22

## 2025-03-19 ENCOUNTER — OFFICE VISIT (OUTPATIENT)
Dept: OBSTETRICS AND GYNECOLOGY | Facility: CLINIC | Age: 35
End: 2025-03-19
Payer: COMMERCIAL

## 2025-03-19 VITALS
DIASTOLIC BLOOD PRESSURE: 76 MMHG | HEIGHT: 62 IN | BODY MASS INDEX: 23.77 KG/M2 | WEIGHT: 129.2 LBS | SYSTOLIC BLOOD PRESSURE: 116 MMHG

## 2025-03-19 DIAGNOSIS — Z36.3 SCREENING, ANTENATAL, FOR MALFORMATION BY ULTRASOUND: ICD-10-CM

## 2025-03-19 DIAGNOSIS — N91.2 AMENORRHEA: Primary | ICD-10-CM

## 2025-03-19 DIAGNOSIS — Z34.81 ENCOUNTER FOR SUPERVISION OF OTHER NORMAL PREGNANCY IN FIRST TRIMESTER: ICD-10-CM

## 2025-03-19 PROCEDURE — 76801 OB US < 14 WKS SINGLE FETUS: CPT | Performed by: OBSTETRICS & GYNECOLOGY

## 2025-03-19 PROCEDURE — 99214 OFFICE O/P EST MOD 30 MIN: CPT | Mod: 25 | Performed by: OBSTETRICS & GYNECOLOGY

## 2025-03-19 NOTE — PROGRESS NOTES
New OB History and Physical    HPI     Patient is a 34 y.o. female who presents with amenorrhea.     LMP 25  EGA 8w5d  LUZ MARIA 10/24/24    Chicken pox -- had as child  Cats at home -- none    OB History:   OB History    Para Term  AB Living   4 2 2 0 1 2   SAB IAB Ectopic Multiple Live Births   0 0 0 0 2      # Outcome Date GA Lbr Andrea/2nd Weight Sex Type Anes PTL Lv   4 Current            3 Term 20 39w0d  3289 g (7 lb 4 oz) M CS-LTranv Spinal N REINALDO      Name: LUCIA TATE      Apgar1: 8  Apgar5: 8   2 Term 19 39w4d  3880 g (8 lb 8.9 oz) M CS-LTranv EPI N REINALDO      Complications: Fetal Intolerance of Labor      Name: LUCIA CORNEJO      Apgar1: 8  Apgar5: 9   1 AB                Genetic Screening:   Congenital Heart Defect:  Neural tube defect:  Hemoglobinopathy or carrier:   Cystic fibrosis:  Chromosome abnormality:  Josue-Sachs/ [place]/ Familial Dysautonomia/ Huntingtons Chorea:  Sickle Cell Disease or Trait:    Hemophilia:   Intellectual disability/autism:   Recurrent pregnancy loss/stillbirth:   Other structural birth defect:   Other genetic disease:   All reviewed and negative for both patient and partner.    Infection history:  Live with someone with TB or exposed to TB:  Patient or partner has history of genital herpes:  Rash or viral illness since last menstrual period:  Prior GBS infected child:  Positive history of sexually transmitted infection:  HIV infection:  History of hepatitis:  Recent travel history her partner travel outside of country:  Recent exposure to Zika virus including by partner:  All reviewed and negative for both patient and partner.    Medical History:   Past Medical History:   Diagnosis Date    Anxiety     used to see a therapist, previously on lexapro    BPPV (benign paroxysmal positional vertigo)     H/O human papillomavirus infection     Migraine headache     Ovarian cyst        Surgical History:   Past Surgical History   Procedure Laterality Date      section  2021     section       SECTION N/A 2022    [place] [place] [facility] [place] [facility] [place]     SECTION Bilateral 2021    Performed by Roxann Marquis DO at [facility]    Tonsillectomy & adenoidectomy         Social History:   Social History     Social History Narrative        Children: 2 boys, 2y/o and 4y/o    Occupation: stay at home mom, used to be a teacher    Caffeine: coffee, 2 cups/d       Family History:   Family History   Problem Relation Name Age of Onset    No Known Problems Biological Mother      Liver cancer Biological Father      Cirrhosis Biological Father      Coronary artery disease Biological Father      Heart failure Biological Father      Stroke Biological Father      Diabetes Biological Father      No Known Problems Biological Sister      Migraines Biological Sister Riya     No Known Problems Biological Brother      No Known Problems Biological Brother      Lung cancer Maternal Grandmother          smoker    Coronary artery disease Maternal Grandfather      Cancer Paternal Grandmother      Cervical cancer Father's Sister      Liver cancer Father's Brother      Coronary artery disease Father's Brother      Prostate cancer Father's Brother      Breast cancer Cousin          Paternal cousin    Colon cancer Neg Hx         Allergies: Patient has no known allergies.    Prior to Admission medications    Medication Sig Start Date End Date Taking? Authorizing Provider   ondansetron ODT (ZOFRAN-ODT) 4 mg disintegrating tablet Take 1 tablet (4 mg total) by mouth every 8 (eight) hours as needed for nausea or vomiting for up to 7 days. 24  Cele Guerrero PA C   ZOLMitriptan (ZOMIG-ZMT) 5 mg disintegrating tablet Take 1 tablet (5 mg total) by mouth once as needed for migraine. May repeat in 2 hours if unresolved. Do not exceed 10 mg in 24 hours. 24  Cele Guerrero PA C       Review of  "Systems  Pertinent items are noted in HPI.    Objective     Vital Signs for the last 24 hours:  BP: (116)/(76) 116/76    Review of Systems and Physical Exam  The following have been reviewed and updated as appropriate in this visit:  Med Hx  Surg Hx  Fam Hx       Visit Vitals  /76   Ht 1.575 m (5' 2\")   Wt 58.6 kg (129 lb 3.2 oz)   LMP 01/17/2025 (Exact Date)   BMI 23.63 kg/m²     HPI  Review of Systems  Physical Exam  Constitutional:       General: She is not in acute distress.     Appearance: She is well-developed. She is not diaphoretic.     Genitourinary:    Vulva, urethra, vagina, cervix, uterus, urethral meatus, external female genitalia and adnexa normal.   There is no right labia majora lesion or labia minora lesion. There is no left labia majora lesion or labia minora lesion. There is no lesion or tenderness on the right external genitalia. There is no lesion or tenderness on the left external genitalia.   Pulmonary:      Effort: Pulmonary effort is normal.   Abdominal:      General: There is no distension.      Palpations: Abdomen is soft. There is no mass.      Tenderness: There is no abdominal tenderness. There is no guarding.   Neurological:      Mental Status: She is alert and oriented to person, place, and time.      Sensory: No sensory deficit.      Motor: No abnormal muscle tone.      Coordination: Coordination normal.   Skin:     General: Skin is warm and dry.      Coloration: Skin is not pale.      Findings: No erythema or rash.   Psychiatric:         Behavior: Behavior normal. Behavior is cooperative.         Thought Content: Thought content normal.         Judgment: Judgment normal.         Transvaginal ultrasound:   CRL 8w6d    Assessment/Plan     Amenorrhea secondary to early pregnancy  LUZ MARIA based on LMP is  10/24/25    Transvaginal ultrasound demonstrates  SIUP,   YS, adnexa WNL  CRL 8w6d, consistent with LMP LUZ MARIA     First OB labs ordered     Pap/HPV neg 2/2024    Declines " NIPT    Rx for NT US    SMA and CF carrier screening prev negative.    Hx of Cdx2, plan for RCS    Practice, call, diet, exercise, pregnancy precautions discussed.      RTO 4 wks

## 2025-03-20 LAB
APPEARANCE UR: CLEAR
BACTERIA #/AREA URNS HPF: NORMAL /HPF
BACTERIA UR CULT: NORMAL
BILIRUB UR QL STRIP: NEGATIVE
C TRACH RRNA SPEC QL NAA+PROBE: NOT DETECTED
COLOR UR: YELLOW
GLUCOSE UR QL STRIP: NEGATIVE
HGB UR QL STRIP: NEGATIVE
HYALINE CASTS #/AREA URNS LPF: NORMAL /LPF
KETONES UR QL STRIP: NEGATIVE
LEUKOCYTE ESTERASE UR QL STRIP: NEGATIVE
N GONORRHOEA RRNA SPEC QL NAA+PROBE: NOT DETECTED
NITRITE UR QL STRIP: NEGATIVE
PH UR STRIP: 6.5 [PH] (ref 5–8)
PROT UR QL STRIP: NEGATIVE
QST (ALWAYS MESSAGE): NORMAL
RBC #/AREA URNS HPF: NORMAL /HPF
SERVICE CMNT-IMP: NORMAL
SP GR UR STRIP: 1.01 (ref 1–1.03)
SQUAMOUS #/AREA URNS HPF: NORMAL /HPF
WBC #/AREA URNS HPF: NORMAL /HPF

## 2025-03-24 ENCOUNTER — TELEPHONE (OUTPATIENT)
Dept: OBSTETRICS AND GYNECOLOGY | Facility: CLINIC | Age: 35
End: 2025-03-24
Payer: COMMERCIAL

## 2025-03-24 DIAGNOSIS — N91.2 AMENORRHEA: Primary | ICD-10-CM

## 2025-04-01 LAB
ABO GROUP BLD: NORMAL
BASOPHILS # BLD AUTO: 28 CELLS/UL (ref 0–200)
BASOPHILS NFR BLD AUTO: 0.5 %
BLD GP AB SCN SERPL QL: NORMAL
EOSINOPHIL # BLD AUTO: 77 CELLS/UL (ref 15–500)
EOSINOPHIL NFR BLD AUTO: 1.4 %
ERYTHROCYTE [DISTWIDTH] IN BLOOD BY AUTOMATED COUNT: 13.9 % (ref 11–15)
HBV SURFACE AG SERPL QL IA: NORMAL
HCT VFR BLD AUTO: 36.5 % (ref 35–45)
HCV AB SERPL QL IA: NORMAL
HGB BLD-MCNC: 12.1 G/DL (ref 11.7–15.5)
HIV 1+2 AB+HIV1 P24 AG SERPL QL IA: NORMAL
LYMPHOCYTES # BLD AUTO: 1441 CELLS/UL (ref 850–3900)
LYMPHOCYTES NFR BLD AUTO: 26.2 %
MCH RBC QN AUTO: 28.7 PG (ref 27–33)
MCHC RBC AUTO-ENTMCNC: 33.2 G/DL (ref 32–36)
MCV RBC AUTO: 86.7 FL (ref 80–100)
MONOCYTES # BLD AUTO: 297 CELLS/UL (ref 200–950)
MONOCYTES NFR BLD AUTO: 5.4 %
NEUTROPHILS # BLD AUTO: 3658 CELLS/UL (ref 1500–7800)
NEUTROPHILS NFR BLD AUTO: 66.5 %
PLATELET # BLD AUTO: 212 THOUSAND/UL (ref 140–400)
PMV BLD REES-ECKER: 10.6 FL (ref 7.5–12.5)
RBC # BLD AUTO: 4.21 MILLION/UL (ref 3.8–5.1)
RH BLD: NORMAL
RPR SER QL: NORMAL
RUBV IGG SERPL IA-ACNC: 3.74 INDEX
VZV IGG SER IA-ACNC: 1.79 S/CO
WBC # BLD AUTO: 5.5 THOUSAND/UL (ref 3.8–10.8)

## 2025-04-16 ENCOUNTER — INITIAL PRENATAL (OUTPATIENT)
Dept: OBSTETRICS AND GYNECOLOGY | Facility: CLINIC | Age: 35
End: 2025-04-16
Payer: COMMERCIAL

## 2025-04-16 ENCOUNTER — HOSPITAL ENCOUNTER (OUTPATIENT)
Dept: PERINATAL CARE | Facility: HOSPITAL | Age: 35
Discharge: HOME | End: 2025-04-16
Payer: COMMERCIAL

## 2025-04-16 VITALS
SYSTOLIC BLOOD PRESSURE: 100 MMHG | BODY MASS INDEX: 23.32 KG/M2 | DIASTOLIC BLOOD PRESSURE: 72 MMHG | HEIGHT: 63 IN | WEIGHT: 131.6 LBS

## 2025-04-16 DIAGNOSIS — N91.2 AMENORRHEA: ICD-10-CM

## 2025-04-16 DIAGNOSIS — Z34.81 ENCOUNTER FOR SUPERVISION OF OTHER NORMAL PREGNANCY, FIRST TRIMESTER: Primary | ICD-10-CM

## 2025-04-16 DIAGNOSIS — Z34.90 PREGNANCY, UNSPECIFIED GESTATIONAL AGE: ICD-10-CM

## 2025-04-16 LAB
GLUCOSE URINE, POC: NEGATIVE
PROTEIN, POC: NEGATIVE

## 2025-04-16 PROCEDURE — 76813 OB US NUCHAL MEAS 1 GEST: CPT | Performed by: OBSTETRICS & GYNECOLOGY

## 2025-04-16 PROCEDURE — 76801 OB US < 14 WKS SINGLE FETUS: CPT | Performed by: OBSTETRICS & GYNECOLOGY

## 2025-04-16 PROCEDURE — 0500F INITIAL PRENATAL CARE VISIT: CPT

## 2025-04-16 PROCEDURE — 81002 URINALYSIS NONAUTO W/O SCOPE: CPT

## 2025-04-16 NOTE — PRENATAL NOTE
Feeling well  No s/s of SAB  PNL normal   NIPT declined   NT scan normal   Fas rx today  MsAFP rx today   RTO in 4 weeks

## 2025-05-14 ENCOUNTER — ROUTINE PRENATAL (OUTPATIENT)
Dept: OBSTETRICS AND GYNECOLOGY | Facility: CLINIC | Age: 35
End: 2025-05-14
Payer: COMMERCIAL

## 2025-05-14 VITALS
DIASTOLIC BLOOD PRESSURE: 72 MMHG | BODY MASS INDEX: 23.99 KG/M2 | SYSTOLIC BLOOD PRESSURE: 108 MMHG | WEIGHT: 135.4 LBS | HEIGHT: 63 IN

## 2025-05-14 DIAGNOSIS — O09.529 ANTEPARTUM MULTIGRAVIDA OF ADVANCED MATERNAL AGE: ICD-10-CM

## 2025-05-14 DIAGNOSIS — Z98.891 HISTORY OF CESAREAN DELIVERY: ICD-10-CM

## 2025-05-14 DIAGNOSIS — O09.90 HIGH RISK PREGNANCY, ANTEPARTUM: Primary | ICD-10-CM

## 2025-05-14 LAB
GLUCOSE URINE, POC: NEGATIVE
PROTEIN, POC: NEGATIVE

## 2025-05-14 PROCEDURE — 81002 URINALYSIS NONAUTO W/O SCOPE: CPT | Performed by: OBSTETRICS & GYNECOLOGY

## 2025-05-14 PROCEDURE — 0502F SUBSEQUENT PRENATAL CARE: CPT | Performed by: OBSTETRICS & GYNECOLOGY

## 2025-05-19 ENCOUNTER — RESULTS FOLLOW-UP (OUTPATIENT)
Dept: OBSTETRICS AND GYNECOLOGY | Facility: CLINIC | Age: 35
End: 2025-05-19

## 2025-05-19 LAB
# FETUSES US: 1
AFP ADJ MOM SERPL: 2.4
AFP INTERP SERPL-IMP: NORMAL
AFP SERPL-MCNC: 102.9 NG/ML
AGE: NORMAL
CLINICAL INFO: NO
DONATED EGG PATIENT QL: NO
GA CLIN EST: 17 WEEKS
GA METHOD: NORMAL
HX OF NTD NARR: NO
HX OF TRISOMY 21 NARR: NO
IDDM PATIENT QL: NO
NEURAL TUBE DEFECT RISK FETUS: NORMAL %
SERVICE CMNT-IMP: NORMAL
SERVICE CMNT-IMP: NORMAL

## 2025-06-10 ENCOUNTER — HOSPITAL ENCOUNTER (OUTPATIENT)
Dept: PERINATAL CARE | Facility: HOSPITAL | Age: 35
Discharge: HOME | End: 2025-06-10
Payer: COMMERCIAL

## 2025-06-10 DIAGNOSIS — Z3A.20 20 WEEKS GESTATION OF PREGNANCY: ICD-10-CM

## 2025-06-10 DIAGNOSIS — O09.522 SUPERVISION OF ELDERLY MULTIGRAVIDA IN SECOND TRIMESTER: ICD-10-CM

## 2025-06-10 DIAGNOSIS — Z36.3 ANTENATAL SCREENING FOR MALFORMATION USING ULTRASONICS: ICD-10-CM

## 2025-06-10 DIAGNOSIS — O35.EXX0 PREGNANCY AFFECTED BY GENITOURINARY ABNORMALITY OF FETUS, SINGLE OR UNSPECIFIED FETUS: Primary | ICD-10-CM

## 2025-06-10 PROCEDURE — 76811 OB US DETAILED SNGL FETUS: CPT | Performed by: OBSTETRICS & GYNECOLOGY

## 2025-06-11 ENCOUNTER — ROUTINE PRENATAL (OUTPATIENT)
Dept: OBSTETRICS AND GYNECOLOGY | Facility: CLINIC | Age: 35
End: 2025-06-11
Payer: COMMERCIAL

## 2025-06-11 VITALS
SYSTOLIC BLOOD PRESSURE: 108 MMHG | HEIGHT: 63 IN | BODY MASS INDEX: 24.73 KG/M2 | DIASTOLIC BLOOD PRESSURE: 70 MMHG | WEIGHT: 139.6 LBS

## 2025-06-11 DIAGNOSIS — O09.529 ANTEPARTUM MULTIGRAVIDA OF ADVANCED MATERNAL AGE: ICD-10-CM

## 2025-06-11 DIAGNOSIS — O35.EXX0 PYELECTASIS OF FETUS ON PRENATAL ULTRASOUND: ICD-10-CM

## 2025-06-11 DIAGNOSIS — Z34.90 PREGNANCY, UNSPECIFIED GESTATIONAL AGE: ICD-10-CM

## 2025-06-11 DIAGNOSIS — Z98.891 HISTORY OF 2 CESAREAN SECTIONS: ICD-10-CM

## 2025-06-11 DIAGNOSIS — Z34.82 ENCOUNTER FOR SUPERVISION OF OTHER NORMAL PREGNANCY, SECOND TRIMESTER: Primary | ICD-10-CM

## 2025-06-11 LAB
GLUCOSE URINE, POC: NEGATIVE
PROTEIN, POC: NEGATIVE

## 2025-06-11 PROCEDURE — 81002 URINALYSIS NONAUTO W/O SCOPE: CPT | Performed by: OBSTETRICS & GYNECOLOGY

## 2025-06-11 PROCEDURE — 0502F SUBSEQUENT PRENATAL CARE: CPT | Performed by: OBSTETRICS & GYNECOLOGY

## 2025-06-11 NOTE — PRENATAL NOTE
- Feels well  - MSAFP negative  - FAS reveals mild b/l pylectasis, otherwise wnl  - Hx c/s x 2: plan 39wk RCS, will schedule  - AMA/fetal pyelectasis: Rx 3rd trimester growth US  - PTL precautions  - RTO 4wks

## 2025-07-09 ENCOUNTER — ROUTINE PRENATAL (OUTPATIENT)
Dept: OBSTETRICS AND GYNECOLOGY | Facility: CLINIC | Age: 35
End: 2025-07-09
Payer: COMMERCIAL

## 2025-07-09 VITALS
SYSTOLIC BLOOD PRESSURE: 104 MMHG | BODY MASS INDEX: 25.69 KG/M2 | DIASTOLIC BLOOD PRESSURE: 66 MMHG | HEIGHT: 63 IN | WEIGHT: 145 LBS

## 2025-07-09 DIAGNOSIS — Z34.82 ENCOUNTER FOR SUPERVISION OF OTHER NORMAL PREGNANCY, SECOND TRIMESTER: Primary | ICD-10-CM

## 2025-07-09 LAB
GLUCOSE URINE, POC: NEGATIVE
PROTEIN, POC: NEGATIVE

## 2025-07-09 PROCEDURE — 81002 URINALYSIS NONAUTO W/O SCOPE: CPT | Performed by: OBSTETRICS & GYNECOLOGY

## 2025-07-09 PROCEDURE — 0502F SUBSEQUENT PRENATAL CARE: CPT | Performed by: OBSTETRICS & GYNECOLOGY

## 2025-07-09 NOTE — PRENATAL NOTE
Doing well  Active FM  Rx for GTT  F/U PTC US 30-32 wks due to mild b/l fetal renal pyelectasis.   RTO 3 wks

## 2025-07-22 LAB
ERYTHROCYTE [DISTWIDTH] IN BLOOD BY AUTOMATED COUNT: 13 % (ref 11–15)
GLUCOSE 1H P 50 G GLC PO SERPL-MCNC: 103 MG/DL
HCT VFR BLD AUTO: 35.7 % (ref 35–45)
HGB BLD-MCNC: 11.3 G/DL (ref 11.7–15.5)
MCH RBC QN AUTO: 30.7 PG (ref 27–33)
MCHC RBC AUTO-ENTMCNC: 31.7 G/DL (ref 32–36)
MCV RBC AUTO: 97 FL (ref 80–100)
PLATELET # BLD AUTO: 170 THOUSAND/UL (ref 140–400)
PMV BLD REES-ECKER: 10.8 FL (ref 7.5–12.5)
RBC # BLD AUTO: 3.68 MILLION/UL (ref 3.8–5.1)
RPR SER QL: NORMAL
WBC # BLD AUTO: 9.4 THOUSAND/UL (ref 3.8–10.8)

## 2025-07-31 ENCOUNTER — ROUTINE PRENATAL (OUTPATIENT)
Dept: OBSTETRICS AND GYNECOLOGY | Facility: CLINIC | Age: 35
End: 2025-07-31
Payer: COMMERCIAL

## 2025-07-31 VITALS
SYSTOLIC BLOOD PRESSURE: 104 MMHG | DIASTOLIC BLOOD PRESSURE: 60 MMHG | BODY MASS INDEX: 26.12 KG/M2 | WEIGHT: 147.4 LBS | HEIGHT: 63 IN

## 2025-07-31 DIAGNOSIS — O99.340 ANXIETY IN PREGNANCY, ANTEPARTUM: ICD-10-CM

## 2025-07-31 DIAGNOSIS — Z34.82 ENCOUNTER FOR SUPERVISION OF OTHER NORMAL PREGNANCY, SECOND TRIMESTER: Primary | ICD-10-CM

## 2025-07-31 DIAGNOSIS — F41.9 ANXIETY IN PREGNANCY, ANTEPARTUM: ICD-10-CM

## 2025-07-31 DIAGNOSIS — O09.90 HIGH RISK PREGNANCY, ANTEPARTUM: ICD-10-CM

## 2025-07-31 DIAGNOSIS — Z98.891 HISTORY OF 2 CESAREAN SECTIONS: ICD-10-CM

## 2025-07-31 DIAGNOSIS — O35.EXX0 PYELECTASIS OF FETUS ON PRENATAL ULTRASOUND: ICD-10-CM

## 2025-07-31 DIAGNOSIS — O09.529 ANTEPARTUM MULTIGRAVIDA OF ADVANCED MATERNAL AGE: ICD-10-CM

## 2025-07-31 LAB
GLUCOSE URINE, POC: NEGATIVE
PROTEIN, POC: NEGATIVE

## 2025-07-31 PROCEDURE — 0502F SUBSEQUENT PRENATAL CARE: CPT | Performed by: OBSTETRICS & GYNECOLOGY

## 2025-07-31 PROCEDURE — 90471 IMMUNIZATION ADMIN: CPT | Performed by: OBSTETRICS & GYNECOLOGY

## 2025-07-31 PROCEDURE — 90715 TDAP VACCINE 7 YRS/> IM: CPT | Performed by: OBSTETRICS & GYNECOLOGY

## 2025-07-31 PROCEDURE — 81002 URINALYSIS NONAUTO W/O SCOPE: CPT | Performed by: OBSTETRICS & GYNECOLOGY

## 2025-07-31 NOTE — PRENATAL NOTE
ROUTINE OB VISIT     HPI     Rosamaria Sherman is a 34 y.o. female being seen today for her obstetrical visit. She is at 27w6d gestation. Patient reports no complaints. Fetal movement: normal   No LOF or VB or cramping                    Assessment/ Plan     Problem List Items Addressed This Visit          Obstetric    Antepartum multigravida of advanced maternal age    Anxiety in pregnancy, antepartum    Overview   Remote use of Lexapro in her 's  Establishing care with therapist next week  No meds currently         High risk pregnancy, antepartum    Overview   [x]PNL  []CF/ SMA  [-]NIPT - declines  [x]NT US - normal  [x]AFP - neg  [x]Anatomy US - scheduled 6/10  [x]28 wk labs  []TDAP  []follow up US for fetal kidneys - scheduled Sept 2  []Flu Shot  []GBS  [x]RCS scheduled 10/17           History of 2  sections    Overview   First for NRFHT            Other    Pyelectasis of fetus on prenatal ultrasound     Other Visit Diagnoses         Encounter for supervision of other normal pregnancy, second trimester    -  Primary               Passed 28wk labs  Repeat US scheduled  to check fetal kidneys   Repeat CS scheduled 10/17  TDAP todau  Rviewed [facility]/ Reasons to call office   RTO 2wks           Cici Prakash DO

## 2025-08-14 ENCOUNTER — ROUTINE PRENATAL (OUTPATIENT)
Dept: OBSTETRICS AND GYNECOLOGY | Facility: CLINIC | Age: 35
End: 2025-08-14
Payer: COMMERCIAL

## 2025-08-14 VITALS
BODY MASS INDEX: 26.9 KG/M2 | WEIGHT: 151.8 LBS | SYSTOLIC BLOOD PRESSURE: 100 MMHG | DIASTOLIC BLOOD PRESSURE: 64 MMHG | HEIGHT: 63 IN

## 2025-08-14 DIAGNOSIS — O09.93 HIGH-RISK PREGNANCY IN THIRD TRIMESTER: Primary | ICD-10-CM

## 2025-08-14 LAB
GLUCOSE URINE, POC: NEGATIVE
NITRITE, POC: NEGATIVE

## 2025-08-14 PROCEDURE — 81002 URINALYSIS NONAUTO W/O SCOPE: CPT | Performed by: OBSTETRICS & GYNECOLOGY

## 2025-08-14 PROCEDURE — 0502F SUBSEQUENT PRENATAL CARE: CPT | Performed by: OBSTETRICS & GYNECOLOGY

## 2025-08-26 ENCOUNTER — ROUTINE PRENATAL (OUTPATIENT)
Dept: OBSTETRICS AND GYNECOLOGY | Facility: CLINIC | Age: 35
End: 2025-08-26
Payer: COMMERCIAL

## 2025-08-26 VITALS
HEIGHT: 63 IN | DIASTOLIC BLOOD PRESSURE: 68 MMHG | SYSTOLIC BLOOD PRESSURE: 92 MMHG | WEIGHT: 153 LBS | BODY MASS INDEX: 27.11 KG/M2

## 2025-08-26 DIAGNOSIS — Z34.93 SUPERVISION OF LOW-RISK PREGNANCY, THIRD TRIMESTER: Primary | ICD-10-CM

## 2025-08-26 LAB
GLUCOSE URINE, POC: NEGATIVE
PROTEIN, POC: NEGATIVE

## 2025-08-26 PROCEDURE — 81002 URINALYSIS NONAUTO W/O SCOPE: CPT | Performed by: OBSTETRICS & GYNECOLOGY

## 2025-08-26 PROCEDURE — 0502F SUBSEQUENT PRENATAL CARE: CPT | Performed by: OBSTETRICS & GYNECOLOGY

## 2025-09-02 ENCOUNTER — HOSPITAL ENCOUNTER (OUTPATIENT)
Dept: PERINATAL CARE | Facility: HOSPITAL | Age: 35
Discharge: HOME | End: 2025-09-02
Attending: OBSTETRICS & GYNECOLOGY
Payer: COMMERCIAL

## 2025-09-02 DIAGNOSIS — O09.529 ANTEPARTUM MULTIGRAVIDA OF ADVANCED MATERNAL AGE: ICD-10-CM

## 2025-09-02 DIAGNOSIS — Z98.891 HISTORY OF 2 CESAREAN SECTIONS: ICD-10-CM

## 2025-09-02 DIAGNOSIS — Z34.90 PREGNANCY, UNSPECIFIED GESTATIONAL AGE: ICD-10-CM

## 2025-09-02 DIAGNOSIS — O35.EXX0 PYELECTASIS OF FETUS ON PRENATAL ULTRASOUND: Primary | ICD-10-CM

## 2025-09-02 PROCEDURE — 76816 OB US FOLLOW-UP PER FETUS: CPT | Performed by: OBSTETRICS & GYNECOLOGY

## (undated) DEVICE — ***USE 138731*** SUTURE VICRYL RAPIDE 3-0 VR416 27IN

## (undated) DEVICE — ***USE 56952*** SUTURE VICRYL 1 J371H CTX 36IN VIOLET

## (undated) DEVICE — Device

## (undated) DEVICE — BLADE SCALPEL #10

## (undated) DEVICE — SUTURE MONOCRYL 4-0 Y494G

## (undated) DEVICE — PAD GROUND ELECTROSURGICAL W/CORD

## (undated) DEVICE — SPONGE LAP 18X18 SAFE-T RFID ENHANCED XRAY

## (undated) DEVICE — ***USE 138714*** SUTURE VICRYL 1 J341H CT-1

## (undated) DEVICE — MANIFOLD FOUR PORT NEPTUNE

## (undated) DEVICE — DRESSING ABD STER LGE 8X10

## (undated) DEVICE — DRESSING TELFA 3X8

## (undated) DEVICE — SPONGE CURITY GAUZE 4

## (undated) DEVICE — SUTURE MONOCRYL 4-0 Y426H PS-2 27IN

## (undated) DEVICE — CONTAINER SPECIMEN STERILE 5OZ

## (undated) DEVICE — SYRINGE BULB 60CC

## (undated) DEVICE — STERILE BLOOD COLLECTION TUBES

## (undated) DEVICE — ***USE 138703*** SUTURE CHROMIC GUT  0   802H

## (undated) DEVICE — SUTURE VICRYL 3-0 RAPIDE 36"

## (undated) DEVICE — ***USE 56895*** SUTURE CHROMIC GUT 3-0 636H

## (undated) DEVICE — ***USE 56891*** SUTURE CHROMIC GUT  0   812H

## (undated) DEVICE — APPLICATOR CHLORAPREP 26ML ORANGE TINT

## (undated) DEVICE — ***LOW USAGE***DRESSING FOAM MEPILEX SACRUM 7

## (undated) DEVICE — TUBING SMOKE EVAC PENCIL COATED

## (undated) DEVICE — PARTICLES HEMOSTATIC ARISTA 3 GRAM

## (undated) DEVICE — PACK RFID MLH DELIVERY STANDARDIZED